# Patient Record
Sex: MALE | Race: WHITE | NOT HISPANIC OR LATINO | Employment: FULL TIME | URBAN - METROPOLITAN AREA
[De-identification: names, ages, dates, MRNs, and addresses within clinical notes are randomized per-mention and may not be internally consistent; named-entity substitution may affect disease eponyms.]

---

## 2018-01-29 ENCOUNTER — OFFICE VISIT (OUTPATIENT)
Dept: RETAIL CLINIC | Facility: CLINIC | Age: 38
End: 2018-01-29

## 2018-01-29 DIAGNOSIS — Z23 NEED FOR VACCINATION: Primary | ICD-10-CM

## 2018-01-29 NOTE — PROGRESS NOTES
Subjective   Devon Smith is a 38 y.o. male.     Reason for Appointment  Vaccine    History of Present Illness  Devon Smith requests Influenza vaccine.  He denies current acute illness, denies adverse reaction to vaccines in the past.  He denies allergy to eggs, latex and any component to vaccines.  He denies history of Guillain Wiseman.    Assessments  Devon was seen today for immunizations.    Diagnoses and all orders for this visit:    Need for vaccination  -     Flu Vaccine Quad PF 3YR+ (FLUARIX/FLUZONE [id number])        VIS given.  Pt advised that the most common post vaccine complaint is that of a sore arm at the injection site.  Pt also advised that this is a normal reaction to this vaccine.  If there are other concerns that arise, the patient has been advised to call the clinic or return to the clinic. If the pt has difficulty breathing, the patient has been advised to go to the ER.  The pt has stated understanding.    This document has been electronically signed by BECK Prater January 29, 2018 6:19 PM

## 2018-11-02 ENCOUNTER — OFFICE VISIT (OUTPATIENT)
Dept: RETAIL CLINIC | Facility: CLINIC | Age: 38
End: 2018-11-02

## 2018-11-02 DIAGNOSIS — Z23 INFLUENZA VACCINATION ADMINISTERED AT CURRENT VISIT: Primary | ICD-10-CM

## 2018-11-02 NOTE — PROGRESS NOTES
S: Requests Flu Vaccine.  Is feeling well today. Denies previous complications of flu vaccine, denies serious egg allergy. [employer] consent obtained.  O: Appears well today.  A: Vaccination against Influenza   P: Age appropriate flu vaccine administered (see vaccine immunization record) Tolerated Well.  Discussed that they may feel achy and fatigued in next day or 2, as their immune system is responding, this is not the flu. Advised that can take tylenol or ibuprofen per package instructions for soreness if needed. Also explained that it takes up to 2 weeks for full immune response. Encouraged general health hygiene. Mona Haro, APRN

## 2023-03-01 ENCOUNTER — APPOINTMENT (OUTPATIENT)
Dept: CT IMAGING | Facility: HOSPITAL | Age: 43
DRG: 246 | End: 2023-03-01
Payer: COMMERCIAL

## 2023-03-01 ENCOUNTER — APPOINTMENT (OUTPATIENT)
Dept: GENERAL RADIOLOGY | Facility: HOSPITAL | Age: 43
DRG: 246 | End: 2023-03-01
Payer: COMMERCIAL

## 2023-03-01 ENCOUNTER — HOSPITAL ENCOUNTER (INPATIENT)
Facility: HOSPITAL | Age: 43
LOS: 3 days | Discharge: HOME | DRG: 246 | End: 2023-03-04
Attending: INTERNAL MEDICINE | Admitting: INTERNAL MEDICINE
Payer: COMMERCIAL

## 2023-03-01 DIAGNOSIS — I21.02 STEMI INVOLVING LEFT ANTERIOR DESCENDING CORONARY ARTERY: Primary | ICD-10-CM

## 2023-03-01 PROBLEM — I10 ESSENTIAL HYPERTENSION: Status: ACTIVE | Noted: 2023-03-01

## 2023-03-01 PROBLEM — E78.5 HYPERLIPIDEMIA LDL GOAL <70: Status: ACTIVE | Noted: 2023-03-01

## 2023-03-01 PROBLEM — I25.110 CORONARY ARTERY DISEASE INVOLVING NATIVE CORONARY ARTERY OF NATIVE HEART WITH UNSTABLE ANGINA PECTORIS: Status: ACTIVE | Noted: 2023-03-01

## 2023-03-01 LAB
ALBUMIN SERPL-MCNC: 4.4 G/DL (ref 3.5–5.2)
ALBUMIN/GLOB SERPL: 1.8 G/DL
ALP SERPL-CCNC: 51 U/L (ref 39–117)
ALT SERPL W P-5'-P-CCNC: 15 U/L (ref 1–41)
ANION GAP SERPL CALCULATED.3IONS-SCNC: 12 MMOL/L (ref 5–15)
ANION GAP SERPL CALCULATED.3IONS-SCNC: 19 MMOL/L (ref 5–15)
AST SERPL-CCNC: 14 U/L (ref 1–40)
BASOPHILS # BLD AUTO: 0.03 10*3/MM3 (ref 0–0.2)
BASOPHILS # BLD AUTO: 0.05 10*3/MM3 (ref 0–0.2)
BASOPHILS NFR BLD AUTO: 0.2 % (ref 0–1.5)
BASOPHILS NFR BLD AUTO: 0.5 % (ref 0–1.5)
BILIRUB SERPL-MCNC: 0.6 MG/DL (ref 0–1.2)
BUN SERPL-MCNC: 15 MG/DL (ref 6–20)
BUN SERPL-MCNC: 21 MG/DL (ref 6–20)
BUN/CREAT SERPL: 21.1 (ref 7–25)
BUN/CREAT SERPL: 21.9 (ref 7–25)
CALCIUM SPEC-SCNC: 8.9 MG/DL (ref 8.6–10.5)
CALCIUM SPEC-SCNC: 9 MG/DL (ref 8.6–10.5)
CHLORIDE SERPL-SCNC: 100 MMOL/L (ref 98–107)
CHLORIDE SERPL-SCNC: 103 MMOL/L (ref 98–107)
CO2 SERPL-SCNC: 17 MMOL/L (ref 22–29)
CO2 SERPL-SCNC: 24 MMOL/L (ref 22–29)
CREAT SERPL-MCNC: 0.71 MG/DL (ref 0.76–1.27)
CREAT SERPL-MCNC: 0.96 MG/DL (ref 0.76–1.27)
DEPRECATED RDW RBC AUTO: 38.8 FL (ref 37–54)
DEPRECATED RDW RBC AUTO: 40.2 FL (ref 37–54)
EGFRCR SERPLBLD CKD-EPI 2021: 115.3 ML/MIN/1.73
EGFRCR SERPLBLD CKD-EPI 2021: 99.3 ML/MIN/1.73
EOSINOPHIL # BLD AUTO: 0.02 10*3/MM3 (ref 0–0.4)
EOSINOPHIL # BLD AUTO: 0.13 10*3/MM3 (ref 0–0.4)
EOSINOPHIL NFR BLD AUTO: 0.2 % (ref 0.3–6.2)
EOSINOPHIL NFR BLD AUTO: 1.2 % (ref 0.3–6.2)
ERYTHROCYTE [DISTWIDTH] IN BLOOD BY AUTOMATED COUNT: 12.2 % (ref 12.3–15.4)
ERYTHROCYTE [DISTWIDTH] IN BLOOD BY AUTOMATED COUNT: 12.2 % (ref 12.3–15.4)
GEN 5 2HR TROPONIN T REFLEX: 6565 NG/L
GLOBULIN UR ELPH-MCNC: 2.4 GM/DL
GLUCOSE SERPL-MCNC: 157 MG/DL (ref 65–99)
GLUCOSE SERPL-MCNC: 169 MG/DL (ref 65–99)
HCT VFR BLD AUTO: 37.9 % (ref 37.5–51)
HCT VFR BLD AUTO: 38.6 % (ref 37.5–51)
HGB BLD-MCNC: 13.1 G/DL (ref 13–17.7)
HGB BLD-MCNC: 13.4 G/DL (ref 13–17.7)
IMM GRANULOCYTES # BLD AUTO: 0.03 10*3/MM3 (ref 0–0.05)
IMM GRANULOCYTES # BLD AUTO: 0.04 10*3/MM3 (ref 0–0.05)
IMM GRANULOCYTES NFR BLD AUTO: 0.2 % (ref 0–0.5)
IMM GRANULOCYTES NFR BLD AUTO: 0.4 % (ref 0–0.5)
LYMPHOCYTES # BLD AUTO: 1.31 10*3/MM3 (ref 0.7–3.1)
LYMPHOCYTES # BLD AUTO: 4.47 10*3/MM3 (ref 0.7–3.1)
LYMPHOCYTES NFR BLD AUTO: 10.9 % (ref 19.6–45.3)
LYMPHOCYTES NFR BLD AUTO: 40.7 % (ref 19.6–45.3)
MAGNESIUM SERPL-MCNC: 1.7 MG/DL (ref 1.6–2.6)
MCH RBC QN AUTO: 30.5 PG (ref 26.6–33)
MCH RBC QN AUTO: 31.1 PG (ref 26.6–33)
MCHC RBC AUTO-ENTMCNC: 34.6 G/DL (ref 31.5–35.7)
MCHC RBC AUTO-ENTMCNC: 34.7 G/DL (ref 31.5–35.7)
MCV RBC AUTO: 88.1 FL (ref 79–97)
MCV RBC AUTO: 89.6 FL (ref 79–97)
MONOCYTES # BLD AUTO: 1.08 10*3/MM3 (ref 0.1–0.9)
MONOCYTES # BLD AUTO: 1.1 10*3/MM3 (ref 0.1–0.9)
MONOCYTES NFR BLD AUTO: 10 % (ref 5–12)
MONOCYTES NFR BLD AUTO: 9 % (ref 5–12)
NEUTROPHILS NFR BLD AUTO: 47.2 % (ref 42.7–76)
NEUTROPHILS NFR BLD AUTO: 5.2 10*3/MM3 (ref 1.7–7)
NEUTROPHILS NFR BLD AUTO: 79.5 % (ref 42.7–76)
NEUTROPHILS NFR BLD AUTO: 9.58 10*3/MM3 (ref 1.7–7)
NRBC BLD AUTO-RTO: 0 /100 WBC (ref 0–0.2)
NRBC BLD AUTO-RTO: 0 /100 WBC (ref 0–0.2)
NT-PROBNP SERPL-MCNC: <5 PG/ML (ref 0–450)
PLATELET # BLD AUTO: 212 10*3/MM3 (ref 140–450)
PLATELET # BLD AUTO: 248 10*3/MM3 (ref 140–450)
PMV BLD AUTO: 9.3 FL (ref 6–12)
PMV BLD AUTO: 9.4 FL (ref 6–12)
POTASSIUM SERPL-SCNC: 3.1 MMOL/L (ref 3.5–5.2)
POTASSIUM SERPL-SCNC: 3.9 MMOL/L (ref 3.5–5.2)
PROT SERPL-MCNC: 6.8 G/DL (ref 6–8.5)
RBC # BLD AUTO: 4.3 10*6/MM3 (ref 4.14–5.8)
RBC # BLD AUTO: 4.31 10*6/MM3 (ref 4.14–5.8)
SODIUM SERPL-SCNC: 136 MMOL/L (ref 136–145)
SODIUM SERPL-SCNC: 139 MMOL/L (ref 136–145)
TROPONIN T DELTA: 6513 NG/L
TROPONIN T SERPL HS-MCNC: 52 NG/L
TSH SERPL DL<=0.05 MIU/L-ACNC: 3.34 UIU/ML (ref 0.27–4.2)
WBC NRBC COR # BLD: 10.99 10*3/MM3 (ref 3.4–10.8)
WBC NRBC COR # BLD: 12.05 10*3/MM3 (ref 3.4–10.8)

## 2023-03-01 PROCEDURE — 83735 ASSAY OF MAGNESIUM: CPT | Performed by: INTERNAL MEDICINE

## 2023-03-01 PROCEDURE — 85025 COMPLETE CBC W/AUTO DIFF WBC: CPT | Performed by: NURSE PRACTITIONER

## 2023-03-01 PROCEDURE — 83880 ASSAY OF NATRIURETIC PEPTIDE: CPT | Performed by: INTERNAL MEDICINE

## 2023-03-01 PROCEDURE — 25510000001 IOPAMIDOL PER 1 ML: Performed by: INTERNAL MEDICINE

## 2023-03-01 PROCEDURE — 93458 L HRT ARTERY/VENTRICLE ANGIO: CPT | Performed by: INTERNAL MEDICINE

## 2023-03-01 PROCEDURE — 0 MAGNESIUM SULFATE 4 GM/100ML SOLUTION: Performed by: NURSE PRACTITIONER

## 2023-03-01 PROCEDURE — C1769 GUIDE WIRE: HCPCS | Performed by: INTERNAL MEDICINE

## 2023-03-01 PROCEDURE — 80050 GENERAL HEALTH PANEL: CPT | Performed by: INTERNAL MEDICINE

## 2023-03-01 PROCEDURE — B221Z2Z COMPUTERIZED TOMOGRAPHY (CT SCAN) OF MULTIPLE CORONARY ARTERIES USING INTRAVASCULAR OPTICAL COHERENCE: ICD-10-PCS | Performed by: INTERNAL MEDICINE

## 2023-03-01 PROCEDURE — C1725 CATH, TRANSLUMIN NON-LASER: HCPCS | Performed by: INTERNAL MEDICINE

## 2023-03-01 PROCEDURE — 92941 PRQ TRLML REVSC TOT OCCL AMI: CPT | Performed by: INTERNAL MEDICINE

## 2023-03-01 PROCEDURE — 25010000002 CANGRELOR TETRASODIUM 50 MG RECONSTITUTED SOLUTION 1 EACH VIAL: Performed by: INTERNAL MEDICINE

## 2023-03-01 PROCEDURE — 84484 ASSAY OF TROPONIN QUANT: CPT | Performed by: INTERNAL MEDICINE

## 2023-03-01 PROCEDURE — 74176 CT ABD & PELVIS W/O CONTRAST: CPT

## 2023-03-01 PROCEDURE — 99223 1ST HOSP IP/OBS HIGH 75: CPT | Performed by: INTERNAL MEDICINE

## 2023-03-01 PROCEDURE — 71250 CT THORAX DX C-: CPT

## 2023-03-01 PROCEDURE — C1887 CATHETER, GUIDING: HCPCS | Performed by: INTERNAL MEDICINE

## 2023-03-01 PROCEDURE — 93005 ELECTROCARDIOGRAM TRACING: CPT

## 2023-03-01 PROCEDURE — 80048 BASIC METABOLIC PNL TOTAL CA: CPT | Performed by: NURSE PRACTITIONER

## 2023-03-01 PROCEDURE — 71045 X-RAY EXAM CHEST 1 VIEW: CPT

## 2023-03-01 PROCEDURE — 86900 BLOOD TYPING SEROLOGIC ABO: CPT | Performed by: NURSE PRACTITIONER

## 2023-03-01 PROCEDURE — 25010000002 FENTANYL CITRATE (PF) 50 MCG/ML SOLUTION: Performed by: INTERNAL MEDICINE

## 2023-03-01 PROCEDURE — 93005 ELECTROCARDIOGRAM TRACING: CPT | Performed by: NURSE PRACTITIONER

## 2023-03-01 PROCEDURE — 92978 ENDOLUMINL IVUS OCT C 1ST: CPT | Performed by: INTERNAL MEDICINE

## 2023-03-01 PROCEDURE — C9606 PERC D-E COR REVASC W AMI S: HCPCS | Performed by: INTERNAL MEDICINE

## 2023-03-01 PROCEDURE — 86901 BLOOD TYPING SEROLOGIC RH(D): CPT | Performed by: NURSE PRACTITIONER

## 2023-03-01 PROCEDURE — 4A023N7 MEASUREMENT OF CARDIAC SAMPLING AND PRESSURE, LEFT HEART, PERCUTANEOUS APPROACH: ICD-10-PCS | Performed by: INTERNAL MEDICINE

## 2023-03-01 PROCEDURE — 82565 ASSAY OF CREATININE: CPT

## 2023-03-01 PROCEDURE — 25010000002 MIDAZOLAM PER 1 MG: Performed by: INTERNAL MEDICINE

## 2023-03-01 PROCEDURE — C1874 STENT, COATED/COV W/DEL SYS: HCPCS | Performed by: INTERNAL MEDICINE

## 2023-03-01 PROCEDURE — B2151ZZ FLUOROSCOPY OF LEFT HEART USING LOW OSMOLAR CONTRAST: ICD-10-PCS | Performed by: INTERNAL MEDICINE

## 2023-03-01 PROCEDURE — 027034Z DILATION OF CORONARY ARTERY, ONE ARTERY WITH DRUG-ELUTING INTRALUMINAL DEVICE, PERCUTANEOUS APPROACH: ICD-10-PCS | Performed by: INTERNAL MEDICINE

## 2023-03-01 PROCEDURE — 25010000002 HEPARIN (PORCINE) PER 1000 UNITS: Performed by: INTERNAL MEDICINE

## 2023-03-01 PROCEDURE — 86850 RBC ANTIBODY SCREEN: CPT | Performed by: NURSE PRACTITIONER

## 2023-03-01 PROCEDURE — 25010000002 ONDANSETRON PER 1 MG: Performed by: INTERNAL MEDICINE

## 2023-03-01 PROCEDURE — C1894 INTRO/SHEATH, NON-LASER: HCPCS | Performed by: INTERNAL MEDICINE

## 2023-03-01 PROCEDURE — 85347 COAGULATION TIME ACTIVATED: CPT

## 2023-03-01 PROCEDURE — 25010000002 KETOROLAC TROMETHAMINE PER 15 MG: Performed by: NURSE PRACTITIONER

## 2023-03-01 PROCEDURE — C9460 INJECTION, CANGRELOR: HCPCS | Performed by: INTERNAL MEDICINE

## 2023-03-01 PROCEDURE — C1753 CATH, INTRAVAS ULTRASOUND: HCPCS | Performed by: INTERNAL MEDICINE

## 2023-03-01 PROCEDURE — B2111ZZ FLUOROSCOPY OF MULTIPLE CORONARY ARTERIES USING LOW OSMOLAR CONTRAST: ICD-10-PCS | Performed by: INTERNAL MEDICINE

## 2023-03-01 DEVICE — XIENCE SKYPOINT™ EVEROLIMUS ELUTING CORONARY STENT SYSTEM 4.00 MM X 38 MM / RAPID-EXCHANGE
Type: IMPLANTABLE DEVICE | Status: FUNCTIONAL
Brand: XIENCE SKYPOINT™

## 2023-03-01 RX ORDER — HEPARIN SODIUM 1000 [USP'U]/ML
INJECTION, SOLUTION INTRAVENOUS; SUBCUTANEOUS
Status: DISCONTINUED | OUTPATIENT
Start: 2023-03-01 | End: 2023-03-01 | Stop reason: HOSPADM

## 2023-03-01 RX ORDER — FENTANYL CITRATE 50 UG/ML
INJECTION, SOLUTION INTRAMUSCULAR; INTRAVENOUS
Status: DISCONTINUED | OUTPATIENT
Start: 2023-03-01 | End: 2023-03-01 | Stop reason: HOSPADM

## 2023-03-01 RX ORDER — MAGNESIUM SULFATE HEPTAHYDRATE 40 MG/ML
4 INJECTION, SOLUTION INTRAVENOUS ONCE
Status: COMPLETED | OUTPATIENT
Start: 2023-03-01 | End: 2023-03-02

## 2023-03-01 RX ORDER — ONDANSETRON 2 MG/ML
INJECTION INTRAMUSCULAR; INTRAVENOUS
Status: DISCONTINUED | OUTPATIENT
Start: 2023-03-01 | End: 2023-03-01 | Stop reason: HOSPADM

## 2023-03-01 RX ORDER — OXYCODONE HYDROCHLORIDE AND ACETAMINOPHEN 5; 325 MG/1; MG/1
1 TABLET ORAL EVERY 4 HOURS PRN
Status: DISCONTINUED | OUTPATIENT
Start: 2023-03-01 | End: 2023-03-04 | Stop reason: HOSPADM

## 2023-03-01 RX ORDER — LOSARTAN POTASSIUM 25 MG/1
25 TABLET ORAL DAILY
Status: DISCONTINUED | OUTPATIENT
Start: 2023-03-01 | End: 2023-03-03

## 2023-03-01 RX ORDER — LIDOCAINE HYDROCHLORIDE 10 MG/ML
INJECTION, SOLUTION EPIDURAL; INFILTRATION; INTRACAUDAL; PERINEURAL
Status: DISCONTINUED | OUTPATIENT
Start: 2023-03-01 | End: 2023-03-01 | Stop reason: HOSPADM

## 2023-03-01 RX ORDER — ALUMINA, MAGNESIA, AND SIMETHICONE 2400; 2400; 240 MG/30ML; MG/30ML; MG/30ML
15 SUSPENSION ORAL EVERY 6 HOURS PRN
Status: DISCONTINUED | OUTPATIENT
Start: 2023-03-01 | End: 2023-03-04 | Stop reason: HOSPADM

## 2023-03-01 RX ORDER — ROSUVASTATIN CALCIUM 20 MG/1
40 TABLET, COATED ORAL NIGHTLY
Status: DISCONTINUED | OUTPATIENT
Start: 2023-03-01 | End: 2023-03-04 | Stop reason: HOSPADM

## 2023-03-01 RX ORDER — ONDANSETRON 4 MG/1
4 TABLET, FILM COATED ORAL EVERY 6 HOURS PRN
Status: DISCONTINUED | OUTPATIENT
Start: 2023-03-01 | End: 2023-03-04 | Stop reason: HOSPADM

## 2023-03-01 RX ORDER — AMLODIPINE BESYLATE 5 MG/1
5 TABLET ORAL ONCE
Status: COMPLETED | OUTPATIENT
Start: 2023-03-01 | End: 2023-03-01

## 2023-03-01 RX ORDER — ASPIRIN 81 MG/1
81 TABLET ORAL DAILY
Status: DISCONTINUED | OUTPATIENT
Start: 2023-03-02 | End: 2023-03-04 | Stop reason: HOSPADM

## 2023-03-01 RX ORDER — NITROGLYCERIN 0.4 MG/1
0.4 TABLET SUBLINGUAL
Status: DISCONTINUED | OUTPATIENT
Start: 2023-03-01 | End: 2023-03-04 | Stop reason: HOSPADM

## 2023-03-01 RX ORDER — ACETAMINOPHEN 325 MG/1
650 TABLET ORAL EVERY 4 HOURS PRN
Status: DISCONTINUED | OUTPATIENT
Start: 2023-03-01 | End: 2023-03-04 | Stop reason: HOSPADM

## 2023-03-01 RX ORDER — KETOROLAC TROMETHAMINE 15 MG/ML
15 INJECTION, SOLUTION INTRAMUSCULAR; INTRAVENOUS EVERY 6 HOURS PRN
Status: DISCONTINUED | OUTPATIENT
Start: 2023-03-01 | End: 2023-03-04 | Stop reason: HOSPADM

## 2023-03-01 RX ORDER — CARVEDILOL 6.25 MG/1
6.25 TABLET ORAL EVERY 12 HOURS SCHEDULED
Status: DISCONTINUED | OUTPATIENT
Start: 2023-03-01 | End: 2023-03-02

## 2023-03-01 RX ORDER — MIDAZOLAM HYDROCHLORIDE 1 MG/ML
INJECTION INTRAMUSCULAR; INTRAVENOUS
Status: DISCONTINUED | OUTPATIENT
Start: 2023-03-01 | End: 2023-03-01 | Stop reason: HOSPADM

## 2023-03-01 RX ORDER — NICARDIPINE HCL-0.9% SOD CHLOR 1 MG/10 ML
SYRINGE (ML) INTRAVENOUS
Status: DISCONTINUED | OUTPATIENT
Start: 2023-03-01 | End: 2023-03-01 | Stop reason: HOSPADM

## 2023-03-01 RX ORDER — MAGNESIUM SULFATE HEPTAHYDRATE 40 MG/ML
4 INJECTION, SOLUTION INTRAVENOUS AS NEEDED
Status: DISCONTINUED | OUTPATIENT
Start: 2023-03-01 | End: 2023-03-04 | Stop reason: HOSPADM

## 2023-03-01 RX ORDER — ALPRAZOLAM 0.25 MG/1
0.25 TABLET ORAL 3 TIMES DAILY PRN
Status: DISCONTINUED | OUTPATIENT
Start: 2023-03-01 | End: 2023-03-04 | Stop reason: HOSPADM

## 2023-03-01 RX ORDER — POTASSIUM CHLORIDE 750 MG/1
40 CAPSULE, EXTENDED RELEASE ORAL EVERY 4 HOURS
Status: COMPLETED | OUTPATIENT
Start: 2023-03-01 | End: 2023-03-02

## 2023-03-01 RX ORDER — ONDANSETRON 2 MG/ML
4 INJECTION INTRAMUSCULAR; INTRAVENOUS EVERY 6 HOURS PRN
Status: DISCONTINUED | OUTPATIENT
Start: 2023-03-01 | End: 2023-03-04 | Stop reason: HOSPADM

## 2023-03-01 RX ORDER — ATORVASTATIN CALCIUM 40 MG/1
80 TABLET, FILM COATED ORAL NIGHTLY
Status: DISCONTINUED | OUTPATIENT
Start: 2023-03-01 | End: 2023-03-01

## 2023-03-01 RX ADMIN — ONDANSETRON 4 MG: 2 INJECTION INTRAMUSCULAR; INTRAVENOUS at 21:20

## 2023-03-01 RX ADMIN — ROSUVASTATIN 40 MG: 20 TABLET, FILM COATED ORAL at 21:11

## 2023-03-01 RX ADMIN — NITROGLYCERIN 0.4 MG: 0.4 TABLET SUBLINGUAL at 22:11

## 2023-03-01 RX ADMIN — MAGNESIUM SULFATE HEPTAHYDRATE 4 G: 40 INJECTION, SOLUTION INTRAVENOUS at 21:12

## 2023-03-01 RX ADMIN — POTASSIUM CHLORIDE 40 MEQ: 10 CAPSULE, COATED, EXTENDED RELEASE ORAL at 21:11

## 2023-03-01 RX ADMIN — LOSARTAN POTASSIUM 25 MG: 25 TABLET, FILM COATED ORAL at 17:31

## 2023-03-01 RX ADMIN — OXYCODONE HYDROCHLORIDE AND ACETAMINOPHEN 1 TABLET: 5; 325 TABLET ORAL at 21:18

## 2023-03-01 RX ADMIN — POTASSIUM CHLORIDE 40 MEQ: 10 CAPSULE, COATED, EXTENDED RELEASE ORAL at 17:42

## 2023-03-01 RX ADMIN — KETOROLAC TROMETHAMINE 15 MG: 15 INJECTION, SOLUTION INTRAMUSCULAR; INTRAVENOUS at 18:43

## 2023-03-01 RX ADMIN — CARVEDILOL 6.25 MG: 6.25 TABLET, FILM COATED ORAL at 18:44

## 2023-03-01 RX ADMIN — AMLODIPINE BESYLATE 5 MG: 5 TABLET ORAL at 21:12

## 2023-03-01 NOTE — PROGRESS NOTES
INTENSIVIST   PROGRESS NOTE        SUBJECTIVE     Devon 43 y.o. male is followed for: No chief complaint on file.       STEMI involving left anterior descending coronary artery (HCC)    Hyperlipidemia LDL goal <70    Essential hypertension    Coronary artery disease involving native coronary artery of native heart with unstable angina pectoris (HCC)    As an Intensivist, we provide an integrated approach to the ICU patient and family, medical management of comorbid conditions, including but not limited to electrolytes, glycemic control, organ dysfunction, lead interdisciplinary rounds and coordinate the care with all other services, including those from other specialists.     Interval History:  POD: Day of Surgery (LHC)    Transferred from [facility] EMS to the Cath Lab because of chest pain, and ECG compatible with an Anterior AMI.     I am seeing him in 2A ICU after his LHC.    He still feels a weight on his chest that does not allow him to take a deep breath.    He states he has had chest pain for the last 10 yrs but his doctor told him not to worry.    He has had chest pain with activities/exercise, but not consistently, he has been able to walk at a fast pace, without pain, but lifting heavy weight triggered chest pains.    Temp  Min: 98.4 °F (36.9 °C)  Max: 98.4 °F (36.9 °C)       History     Last Reviewed by Kam Mancilla MD on 3/1/2023 at  5:12 PM    Sections Reviewed    Medical, Family, Social Documentation    Problem list reviewed by Kam Mancilla MD on 3/1/2023 at  5:12 PM  Medicines reviewed by Kam Mancilla MD on 3/1/2023 at  5:12 PM  Allergies reviewed by Kam Mancilla MD on 3/1/2023 at  5:12 PM       The patient's relevant past medical, surgical and social history were reviewed and updated in Epic as appropriate.        OBJECTIVE     Vitals:  Temp: 98.4 °F (36.9 °C) (03/01/23 1650) Temp  Min: 98.4 °F (36.9 °C)  Max: 98.4 °F (36.9 °C)   Temp core:      BP: 111/79 (03/01/23 1700) BP  Min:  111/79  Max: 176/103   MAP (non-invasive) Noninvasive MAP (mmHg): 88 (23 1700) Noninvasive MAP (mmHg)  Av  Min: 88  Max: 92   Pulse: 96 (23 1700) Pulse  Min: 96  Max: 123   Resp: 16 (23 165) Resp  Min: 16  Max: 19   SpO2: 96 % (23 170) SpO2  Min: 96 %  Max: 99 %   Device: room air (23)    Flow Rate: 2 (23 153) Flow (L/min)  Min: 2  Max: 2         23  1535   Weight: 81.6 kg (179 lb 14.3 oz)        Intake/Ouptut 24 hrs (7:00AM - 6:59 AM)  Intake & Output (last 3 days)     None             Physical Examination  Telemetry:  Rhythm: normal sinus rhythm (23 162)         Constitutional:  No acute distress.   Cardiovascular: RRR.    Respiratory: Normal breath sounds  No adventitious sounds.   Abdominal:  Soft with no tenderness.   Extremities: No Edema   Neurological:   Alert, Oriented, Cooperative.                   Results Reviewed:  Laboratory  Microbiology  Radiology  Pathology    Hematology:  Results from last 7 days   Lab Units 23  1510   WBC 10*3/mm3 10.99*   HEMOGLOBIN g/dL 13.1   MCV fL 88.1   PLATELETS 10*3/mm3 248   NEUTROS ABS 10*3/mm3 5.20   EOS ABS 10*3/mm3 0.13       Chemistry:  Estimated Creatinine Clearance: 112.2 mL/min (by C-G formula based on SCr of 0.96 mg/dL).    Results from last 7 days   Lab Units 23  1554   SODIUM mmol/L 139   POTASSIUM mmol/L 3.1*   CHLORIDE mmol/L 103   CO2 mmol/L 17.0*   BUN mg/dL 21*   CREATININE mg/dL 0.96   GLUCOSE mg/dL 157*     Results from last 7 days   Lab Units 23  1554   CALCIUM mg/dL 8.9     Results from last 7 days   Lab Units 23  1554   BILIRUBIN mg/dL 0.6   AST (SGOT) U/L 14   ALT (SGPT) U/L 15   ALK PHOS U/L 51       Cardiac Labs:  Results from last 7 days   Lab Units 23  1554   PROBNP pg/mL <5.0   HSTROP T ng/L 52*       Images:  No radiology results for the last day    Echo:    Results: Reviewed.  I reviewed the patient's new laboratory and imaging results.  I independently  reviewed the patient's new images.    Medications: Reviewed.    Assessment   A/P     Hospital:  LOS: 0 days   ICU: 22m     Active Hospital Problems    Diagnosis  POA   • **STEMI involving left anterior descending coronary artery (HCC) [I21.02]  Yes   • Hyperlipidemia LDL goal <70 [E78.5]  Unknown   • Essential hypertension [I10]  Unknown   • Coronary artery disease involving native coronary artery of native heart with unstable angina pectoris (HCC) [I25.110]  Unknown     3/1/2023: Anterior STEMI, mid LAD 10 percent thrombotic occlusion status post OCT guided PCI with a 4.0 x 38 mm Xience KAVON, proximal LAD 30-40%, normal circumflex and RCA, LVEF 25-30% due to anterior apical and distal inferior akinesis, EDP 36 mmHg       Devon is a 43 y.o. male transferred from [facility] on 3/1/2023 withchest pain and anterior STEMI.    Substernal chest pain and pressure, described as a squeezing, crushing sensation in his substernal chest region rating to bilateral arms and bilateral jaw.  EMS performed field ECG which showed significant 4 mm anterior lateral ST elevation with reciprocal depression.         Assessment/Management/Treatment Plan:    1. Anterior STEMI  a. [facility] 3/1/2023: mid LAD 10% thrombotic occlusion status post OCT guided PCI with a 4.0 x 38 mm Xience KAVON, proximal LAD 30-40%, normal circumflex and RCA, LVEF 25-30% due to anterior apical and distal inferior akinesis, EDP 36 mmHg  2. Cardiovascular  a. HTN  b. Dyslipidemia  c. CAD  3. Non-smoker  4. Endocrine  a. Body mass index is 23.1 kg/m². Normal: 18.5-24.9kg/m2  b. No history of T2 Diabetes    No results found for: HGBA1C        Diet: Diet: Cardiac Diets; Healthy Heart (2-3 Na+); Texture: Regular Texture (IDDSI 7); Fluid Consistency: Thin (IDDSI 0)   Advance Directives: Code Status and Medical Interventions:   Ordered at: 03/01/23 1635     Level Of Support Discussed With:    Patient     Code Status (Patient has no pulse and is not breathing):    CPR  (Attempt to Resuscitate)     Medical Interventions (Patient has pulse or is breathing):    Full Support     Release to patient:    Routine Release        DVT prophylaxis:  No DVT prophylaxis order currently exists.     In brief:  1. Monitor electrolytes. Replace K. Check Magnesium.  2. Cangrelor continuous intravenous infusion   3. PCXR  4. Monitor for arrhthymias  5. Monitor for bleeding  6. Hemodynamic support.  7. Disposition: Admit to ICU    Plan of care and goals reviewed during interdisciplinary rounds.  I discussed the patient's findings and my recommendations with patient and nursing staff    MDM:    Problem(s) High due to: Acute illness(es) that may pose a threat to life or bodily function  Risk: High due to: drug(s) requiring intensive monitoring for toxicity    High      [x] Primary Attending Intensive Care Medicine - Nutrition Support   [x] Consultant

## 2023-03-01 NOTE — H&P
[facility] at [facility]  Cardiology H&P Note  [facility] CATH LAB          Patient Identification:  Devon Smith      [id number]  43 y.o.        male  1980       Date of Admission:  03/01/23    Chief complaint: Chest pain    PCP: Provider, No Known  Primary cardiologist:     History of Present Illness:     43-year-old gentleman with previous history of hyperlipidemia, presenting via [facility] with chest pain and anterior STEMI, he developed substernal chest pain and pressure, described as a squeezing, crushing sensation in his substernal chest region rating to bilateral arms and bilateral jaw, EMS performed field ECG which showed significant 4 mm anterior lateral ST elevation with reciprocal depression.  He was transported emergently to our Cath Lab.  He still has jaw pain and feels clammy appears diaphoretic.  No previous history of tobacco abuse or coronary disease.    Past History:  Hyperlipidemia  Hypertension    No significant previous surgical history  Social History     Socioeconomic History   • Marital status:      Denies tobacco or alcohol use     family history:  No previous significant family history of coronary artery disease.  Medications:  No medications prior to admission.     Current medications:    Current IV drips:  No current facility-administered medications for this encounter.      Review of Systems:    Constitutional no fever,  no weight loss   Skin no rash   Otolaryngeal no difficulty swallowing   Cardiovascular See HPI   Pulmonary no cough, no sputum production   Gastrointestinal no constipation, no diarrhea   Genitourinary no dysuria, no hematuria   Hematologic no easy bruisability, no abnormal bleeding   Musculoskeletal no muscle pain   Neurologic no dizziness, no falls     Physical exam:    There were no vitals taken for this visit. There is no height or weight on file to calculate BMI.   Oxygen saturation   No data  recorded    General Appearance:   · well developed  · well nourished  HENT:   · oropharynx moist  · lips not cyanotic  Neck:  · thyroid not enlarged  · supple  Respiratory:  · no respiratory distress  · normal breath sounds  · no rales  Cardiovascular:  · no jugular venous distention  · regular rhythm  · apical impulse normal  · S1 normal, S2 normal  · no S3, no S4   · no murmur  · no rub, no thrill  · carotid pulses normal; no bruit  · pedal pulses normal  · lower extremity edema: none    Gastrointestinal:   · bowel sounds normal  · non-tender  · no hepatomegaly, no splenomegaly  Musculoskeletal:  · no clubbing of fingers.   · normocephalic, head atraumatic  Skin:   · warm, dry  Psychiatric:  · judgement and insight appropriate  · normal mood and affect    Cardiographics:     ECG  (personally reviewed) normal sinus rhythm with significant 4 mm ST elevation in the precordial leads.   Telemetry:  (personally reviewed) normal sinus rhythm with frequent PVCs   ECHO:        CATH:     CARDIOLITE:      Lab Review:           Invalid input(s): PLATELETCT            CrCl cannot be calculated (No successful lab value found.).   No results found for: CHOL, TRIG, HDL, LDL, AST, ALT        No results found for: TSH   No results found for: HGBA1C        No results found for: DIGOXIN   No results found for: DDIMERQUANT     Imaging:  All pertinent imaging studies were personally reviewed.    Assessment:      STEMI involving left anterior descending coronary artery (HCC)    Hyperlipidemia LDL goal <70    Essential hypertension          Recommendations:  1.  Anterior STEMI:  Proceed with emergent cardiac catheterization  Patient received aspirin 325 mg via EMS as well as nitroglycerin  Check echocardiogram   dual antiplatelet therapy will be prescribed  High intensity statin therapy will be administered    2.  Mixed hyperlipidemia:  Check lipid panel in the morning  Start atorvastatin 80 mg daily    3.  Essential hypertension:  Goal  blood pressure less than 130/80  Beta-blocker and ARB will be prescribed    Discussed with patient's nurse, and EMS        Arsenio Ramos MD  3/1/2023  15:34 EST

## 2023-03-02 ENCOUNTER — APPOINTMENT (OUTPATIENT)
Dept: CARDIOLOGY | Facility: HOSPITAL | Age: 43
DRG: 246 | End: 2023-03-02
Payer: COMMERCIAL

## 2023-03-02 LAB
ABO GROUP BLD: NORMAL
ABO GROUP BLD: NORMAL
ACT BLD: 221 SECONDS (ref 82–152)
ACT BLD: 263 SECONDS (ref 82–152)
ANION GAP SERPL CALCULATED.3IONS-SCNC: 15 MMOL/L (ref 5–15)
BH CV ECHO MEAS - AO MAX PG: 5.8 MMHG
BH CV ECHO MEAS - AO MEAN PG: 3 MMHG
BH CV ECHO MEAS - AO ROOT DIAM: 3.9 CM
BH CV ECHO MEAS - AO V2 MAX: 120 CM/SEC
BH CV ECHO MEAS - AO V2 VTI: 17.8 CM
BH CV ECHO MEAS - AVA(I,D): 2.8 CM2
BH CV ECHO MEAS - EDV(CUBED): 97.3 ML
BH CV ECHO MEAS - EDV(MOD-SP2): 186 ML
BH CV ECHO MEAS - EDV(MOD-SP4): 170 ML
BH CV ECHO MEAS - EF(MOD-BP): 15.7 %
BH CV ECHO MEAS - EF(MOD-SP2): 13.4 %
BH CV ECHO MEAS - EF(MOD-SP4): 17.6 %
BH CV ECHO MEAS - ESV(CUBED): 54.9 ML
BH CV ECHO MEAS - ESV(MOD-SP2): 161 ML
BH CV ECHO MEAS - ESV(MOD-SP4): 140 ML
BH CV ECHO MEAS - FS: 17.4 %
BH CV ECHO MEAS - IVS/LVPW: 1.09 CM
BH CV ECHO MEAS - IVSD: 1.2 CM
BH CV ECHO MEAS - LA DIMENSION: 2.9 CM
BH CV ECHO MEAS - LAT PEAK E' VEL: 7.1 CM/SEC
BH CV ECHO MEAS - LV MASS(C)D: 192.9 GRAMS
BH CV ECHO MEAS - LV MAX PG: 3.8 MMHG
BH CV ECHO MEAS - LV MEAN PG: 2 MMHG
BH CV ECHO MEAS - LV V1 MAX: 96.9 CM/SEC
BH CV ECHO MEAS - LV V1 VTI: 13.1 CM
BH CV ECHO MEAS - LVIDD: 4.6 CM
BH CV ECHO MEAS - LVIDS: 3.8 CM
BH CV ECHO MEAS - LVOT AREA: 3.8 CM2
BH CV ECHO MEAS - LVOT DIAM: 2.2 CM
BH CV ECHO MEAS - LVPWD: 1.1 CM
BH CV ECHO MEAS - MED PEAK E' VEL: 5 CM/SEC
BH CV ECHO MEAS - MV DEC SLOPE: 264 CM/SEC2
BH CV ECHO MEAS - MV E MAX VEL: 104 CM/SEC
BH CV ECHO MEAS - MV MAX PG: 6.7 MMHG
BH CV ECHO MEAS - MV MEAN PG: 3 MMHG
BH CV ECHO MEAS - MV P1/2T: 133.1 MSEC
BH CV ECHO MEAS - MV V2 VTI: 22.8 CM
BH CV ECHO MEAS - MVA(P1/2T): 1.65 CM2
BH CV ECHO MEAS - MVA(VTI): 2.18 CM2
BH CV ECHO MEAS - PA ACC TIME: 0.16 SEC
BH CV ECHO MEAS - PA PR(ACCEL): 6.1 MMHG
BH CV ECHO MEAS - SV(LVOT): 49.8 ML
BH CV ECHO MEAS - SV(MOD-SP2): 25 ML
BH CV ECHO MEAS - SV(MOD-SP4): 30 ML
BH CV ECHO MEAS - TAPSE (>1.6): 1.98 CM
BH CV ECHO MEASUREMENTS AVERAGE E/E' RATIO: 17.19
BH CV XLRA - RV BASE: 3 CM
BH CV XLRA - RV LENGTH: 5.1 CM
BH CV XLRA - RV MID: 2.6 CM
BH CV XLRA - TDI S': 17.1 CM/SEC
BLD GP AB SCN SERPL QL: NEGATIVE
BUN SERPL-MCNC: 12 MG/DL (ref 6–20)
BUN/CREAT SERPL: 13.8 (ref 7–25)
CALCIUM SPEC-SCNC: 9.1 MG/DL (ref 8.6–10.5)
CHLORIDE SERPL-SCNC: 99 MMOL/L (ref 98–107)
CHOLEST SERPL-MCNC: 232 MG/DL (ref 0–200)
CO2 SERPL-SCNC: 23 MMOL/L (ref 22–29)
CREAT BLDA-MCNC: 0.9 MG/DL (ref 0.6–1.3)
CREAT SERPL-MCNC: 0.87 MG/DL (ref 0.76–1.27)
EGFRCR SERPLBLD CKD-EPI 2021: 108.4 ML/MIN/1.73
GLUCOSE SERPL-MCNC: 154 MG/DL (ref 65–99)
HBA1C MFR BLD: 5.7 % (ref 4.8–5.6)
HDLC SERPL-MCNC: 54 MG/DL (ref 40–60)
LDLC SERPL CALC-MCNC: 156 MG/DL (ref 0–100)
LDLC/HDLC SERPL: 2.84 {RATIO}
LEFT ATRIUM VOLUME INDEX: 9.9 ML/M2
MAGNESIUM SERPL-MCNC: 2.6 MG/DL (ref 1.6–2.6)
MAXIMAL PREDICTED HEART RATE: 175 BPM
PA ADP PRP-ACNC: 143 PRU
POTASSIUM SERPL-SCNC: 4.5 MMOL/L (ref 3.5–5.2)
RH BLD: POSITIVE
RH BLD: POSITIVE
SODIUM SERPL-SCNC: 137 MMOL/L (ref 136–145)
STRESS TARGET HR: 149 BPM
T&S EXPIRATION DATE: NORMAL
TRIGL SERPL-MCNC: 122 MG/DL (ref 0–150)
TROPONIN T SERPL HS-MCNC: ABNORMAL NG/L
VLDLC SERPL-MCNC: 22 MG/DL (ref 5–40)

## 2023-03-02 PROCEDURE — 25010000002 SULFUR HEXAFLUORIDE MICROSPH 60.7-25 MG RECONSTITUTED SUSPENSION: Performed by: INTERNAL MEDICINE

## 2023-03-02 PROCEDURE — 93306 TTE W/DOPPLER COMPLETE: CPT | Performed by: INTERNAL MEDICINE

## 2023-03-02 PROCEDURE — 99232 SBSQ HOSP IP/OBS MODERATE 35: CPT | Performed by: INTERNAL MEDICINE

## 2023-03-02 PROCEDURE — 86900 BLOOD TYPING SEROLOGIC ABO: CPT

## 2023-03-02 PROCEDURE — 86901 BLOOD TYPING SEROLOGIC RH(D): CPT

## 2023-03-02 PROCEDURE — 93005 ELECTROCARDIOGRAM TRACING: CPT | Performed by: INTERNAL MEDICINE

## 2023-03-02 PROCEDURE — 83735 ASSAY OF MAGNESIUM: CPT | Performed by: INTERNAL MEDICINE

## 2023-03-02 PROCEDURE — 80048 BASIC METABOLIC PNL TOTAL CA: CPT | Performed by: INTERNAL MEDICINE

## 2023-03-02 PROCEDURE — 83036 HEMOGLOBIN GLYCOSYLATED A1C: CPT | Performed by: INTERNAL MEDICINE

## 2023-03-02 PROCEDURE — 84484 ASSAY OF TROPONIN QUANT: CPT | Performed by: INTERNAL MEDICINE

## 2023-03-02 PROCEDURE — 80061 LIPID PANEL: CPT | Performed by: INTERNAL MEDICINE

## 2023-03-02 PROCEDURE — 93306 TTE W/DOPPLER COMPLETE: CPT

## 2023-03-02 PROCEDURE — 85576 BLOOD PLATELET AGGREGATION: CPT | Performed by: INTERNAL MEDICINE

## 2023-03-02 PROCEDURE — 63710000001 ONDANSETRON PER 8 MG: Performed by: INTERNAL MEDICINE

## 2023-03-02 PROCEDURE — 63710000001 PROMETHAZINE PER 25 MG

## 2023-03-02 PROCEDURE — 25010000002 KETOROLAC TROMETHAMINE PER 15 MG: Performed by: NURSE PRACTITIONER

## 2023-03-02 PROCEDURE — 99233 SBSQ HOSP IP/OBS HIGH 50: CPT | Performed by: INTERNAL MEDICINE

## 2023-03-02 PROCEDURE — 25010000002 ONDANSETRON PER 1 MG: Performed by: INTERNAL MEDICINE

## 2023-03-02 RX ORDER — OMEGA-3 FATTY ACIDS/FISH OIL 300-1000MG
2 CAPSULE ORAL 2 TIMES DAILY PRN
COMMUNITY
End: 2023-03-04 | Stop reason: HOSPADM

## 2023-03-02 RX ORDER — AMLODIPINE BESYLATE 5 MG/1
5 TABLET ORAL DAILY
Status: DISCONTINUED | OUTPATIENT
Start: 2023-03-02 | End: 2023-03-03

## 2023-03-02 RX ORDER — CARVEDILOL 12.5 MG/1
12.5 TABLET ORAL EVERY 12 HOURS SCHEDULED
Status: DISCONTINUED | OUTPATIENT
Start: 2023-03-02 | End: 2023-03-03

## 2023-03-02 RX ORDER — PROMETHAZINE HYDROCHLORIDE 25 MG/1
25 TABLET ORAL ONCE
Status: COMPLETED | OUTPATIENT
Start: 2023-03-02 | End: 2023-03-02

## 2023-03-02 RX ADMIN — CARVEDILOL 6.25 MG: 6.25 TABLET, FILM COATED ORAL at 08:52

## 2023-03-02 RX ADMIN — KETOROLAC TROMETHAMINE 15 MG: 15 INJECTION, SOLUTION INTRAMUSCULAR; INTRAVENOUS at 07:59

## 2023-03-02 RX ADMIN — ALPRAZOLAM 0.25 MG: 0.25 TABLET ORAL at 14:12

## 2023-03-02 RX ADMIN — TICAGRELOR 90 MG: 90 TABLET ORAL at 08:53

## 2023-03-02 RX ADMIN — PROMETHAZINE HYDROCHLORIDE 25 MG: 25 TABLET ORAL at 01:48

## 2023-03-02 RX ADMIN — TICAGRELOR 90 MG: 90 TABLET ORAL at 20:33

## 2023-03-02 RX ADMIN — CARVEDILOL 12.5 MG: 12.5 TABLET, FILM COATED ORAL at 20:32

## 2023-03-02 RX ADMIN — KETOROLAC TROMETHAMINE 15 MG: 15 INJECTION, SOLUTION INTRAMUSCULAR; INTRAVENOUS at 01:48

## 2023-03-02 RX ADMIN — KETOROLAC TROMETHAMINE 15 MG: 15 INJECTION, SOLUTION INTRAMUSCULAR; INTRAVENOUS at 14:12

## 2023-03-02 RX ADMIN — ONDANSETRON HYDROCHLORIDE 4 MG: 4 TABLET, FILM COATED ORAL at 11:47

## 2023-03-02 RX ADMIN — POTASSIUM CHLORIDE 40 MEQ: 10 CAPSULE, COATED, EXTENDED RELEASE ORAL at 04:29

## 2023-03-02 RX ADMIN — SULFUR HEXAFLUORIDE 3 ML: KIT at 13:47

## 2023-03-02 RX ADMIN — ASPIRIN 81 MG: 81 TABLET, COATED ORAL at 08:53

## 2023-03-02 RX ADMIN — LOSARTAN POTASSIUM 25 MG: 25 TABLET, FILM COATED ORAL at 08:52

## 2023-03-02 RX ADMIN — NITROGLYCERIN 0.4 MG: 0.4 TABLET SUBLINGUAL at 06:46

## 2023-03-02 RX ADMIN — NITROGLYCERIN 0.4 MG: 0.4 TABLET SUBLINGUAL at 06:35

## 2023-03-02 RX ADMIN — NITROGLYCERIN 0.4 MG: 0.4 TABLET SUBLINGUAL at 06:40

## 2023-03-02 RX ADMIN — ONDANSETRON 4 MG: 2 INJECTION INTRAMUSCULAR; INTRAVENOUS at 06:34

## 2023-03-02 RX ADMIN — ALPRAZOLAM 0.25 MG: 0.25 TABLET ORAL at 06:51

## 2023-03-02 RX ADMIN — ROSUVASTATIN 40 MG: 20 TABLET, FILM COATED ORAL at 20:32

## 2023-03-02 RX ADMIN — AMLODIPINE BESYLATE 5 MG: 5 TABLET ORAL at 11:47

## 2023-03-02 NOTE — PLAN OF CARE
Goal Outcome Evaluation:  Continuing to monitor in the ICU, family supportive at bedside, education provided, Provider notified of b/ood pressure and heart rate. Medication adjusted.

## 2023-03-02 NOTE — PROGRESS NOTES
Afternoon rounds:  Patient remarkably better, denies chest pain, states he feels much better.  Ambulated to restroom without difficulty.  Denies shortness of breath.  Still mildly tachycardic but improved.  Blood pressure 120s over 80s currently.  I discussed overall prognosis, and echo findings showing severe cardiomyopathy.    Hopeful for some degree of myocardial recovery with revascularization and optimal medical therapy.    Plan also discussed with Dr. Mancilla

## 2023-03-02 NOTE — PROGRESS NOTES
"INTENSIVIST   PROGRESS NOTE        SUBJECTIVE     Devon 43 y.o. male is followed for: No chief complaint on file.       STEMI involving LAD (HCC)    Hyperlipidemia LDL goal <70    Essential hypertension    Coronary artery disease involving native coronary artery of native heart with unstable angina pectoris (HCC)    As an Intensivist, we provide an integrated approach to the ICU patient and family, medical management of comorbid conditions, including but not limited to electrolytes, glycemic control, organ dysfunction, lead interdisciplinary rounds and coordinate the care with all other services, including those from other specialists.     Interval History:  POD: 1 Day Post-Op (LHC)    \"Rough night\"    Chest heaviness.    N/V    \"Panic attacks\"    Temp  Min: 98.3 °F (36.8 °C)  Max: 99.1 °F (37.3 °C)       History     Last Reviewed by Kam Mancilla MD on 3/1/2023 at  5:12 PM    Sections Reviewed    Medical, Family, Social Documentation      Problem list reviewed by Kam Mancilla MD on 3/1/2023 at  5:12 PM  Medicines reviewed by Kam Mancilla MD on 3/1/2023 at  5:12 PM  Allergies reviewed by Kam Mancilla MD on 3/1/2023 at  5:12 PM       The patient's relevant past medical, surgical and social history were reviewed and updated in Epic as appropriate.        OBJECTIVE     Vitals:  Temp: 99 °F (37.2 °C) (23 0800) Temp  Min: 98.3 °F (36.8 °C)  Max: 99.1 °F (37.3 °C)   Temp core:      BP: 116/87 (2330) BP  Min: 111/79  Max: 176/103   MAP (non-invasive) Noninvasive MAP (mmHg): 95 (23) Noninvasive MAP (mmHg)  Av.4  Min: 88  Max: 130   Pulse: 114 (23) Pulse  Min: 96  Max: 133   Resp: 18 (23 0800) Resp  Min: 16  Max: 20   SpO2: 95 % (23) SpO2  Min: 91 %  Max: 100 %   Device: room air (23 0800)    Flow Rate: 2 (23 1534) Flow (L/min)  Min: 2  Max: 2         23  1535 23  0905   Weight: 81.6 kg (179 lb 14.3 oz) 81.2 kg (179 lb)    "     Intake/Ouptut 24 hrs (7:00AM - 6:59 AM)  Intake & Output (last 3 days)       02/27 0701 02/28 0700 02/28 0701 03/01 0700 03/01 0701 03/02 0700 03/02 0701 03/03 0700    P.O.   480 240    I.V. (mL/kg)   100 (1.2)     Total Intake(mL/kg)   580 (7.1) 240 (3)    Urine (mL/kg/hr)   550 400 (1.2)    Emesis/NG output   0     Stool   0     Total Output   550 400    Net   +30 -160            Stool Unmeasured Occurrence   1 x     Emesis Unmeasured Occurrence   3 x              Physical Examination  Telemetry:  Rhythm: sinus tachycardia (03/02/23 1000)         Constitutional:  No acute distress.   Cardiovascular: RRR.    Respiratory: Normal breath sounds  No adventitious sounds.   Abdominal:  Soft with no tenderness.   Extremities: No Edema   Neurological:   Alert, Oriented, Cooperative.  Best Eye Response: 4-->(E4) spontaneous (03/02/23 1000)  Best Motor Response: 6-->(M6) obeys commands (03/02/23 1000)  Best Verbal Response: 5-->(V5) oriented (03/02/23 1000)  Adelina Coma Scale Score: 15 (03/02/23 1000)       Results Reviewed:  Laboratory  Microbiology  Radiology  Pathology    Hematology:  Results from last 7 days   Lab Units 03/01/23 2255 03/01/23  1510   WBC 10*3/mm3 12.05* 10.99*   HEMOGLOBIN g/dL 13.4 13.1   MCV fL 89.6 88.1   PLATELETS 10*3/mm3 212 248   NEUTROS ABS 10*3/mm3 9.58* 5.20   EOS ABS 10*3/mm3 0.02 0.13       Chemistry:  Estimated Creatinine Clearance: 150.9 mL/min (A) (by C-G formula based on SCr of 0.71 mg/dL (L)).    Results from last 7 days   Lab Units 03/01/23 2255 03/01/23  1554   SODIUM mmol/L 136 139   POTASSIUM mmol/L 3.9 3.1*   CHLORIDE mmol/L 100 103   CO2 mmol/L 24.0 17.0*   BUN mg/dL 15 21*   CREATININE mg/dL 0.71* 0.96   GLUCOSE mg/dL 169* 157*     Results from last 7 days   Lab Units 03/02/23  0349 03/01/23  2255 03/01/23  1554   CALCIUM mg/dL  --  9.0 8.9   MAGNESIUM mg/dL 2.6  --  1.7     Results from last 7 days   Lab Units 03/01/23  1554   BILIRUBIN mg/dL 0.6   AST (SGOT) U/L 14    ALT (SGPT) U/L 15   ALK PHOS U/L 51       Cardiac Labs:  Results from last 7 days   Lab Units 03/02/23  0349 03/01/23  1738 03/01/23  1554   PROBNP pg/mL  --   --  <5.0   HSTROP T ng/L >10,000* 6,565* 52*       Images:  CT Abdomen Pelvis Without Contrast    Result Date: 3/2/2023  No acute process within the chest, abdomen or pelvis. Electronically signed by:  Kali Azar D.O.  3/1/2023 10:24 PM Mountain Time    CT Chest Without Contrast Diagnostic    Result Date: 3/2/2023  No acute process within the chest, abdomen or pelvis. Electronically signed by:  Kali Azar D.O.  3/1/2023 10:24 PM Mountain Time    XR Chest 1 View    Result Date: 3/1/2023  1.No evidence for acute cardiopulmonary process. Electronically Signed: Manuel Spence  3/1/2023 6:46 PM EST  Workstation ID: [id number]      Results: Reviewed.  I reviewed the patient's new laboratory and imaging results.  I independently reviewed the patient's new images.    Medications: Reviewed.    Assessment   A/P     Hospital:  LOS: 1 day   ICU: 18h     Active Hospital Problems    Diagnosis  POA   • **STEMI involving left anterior descending coronary artery (HCC) [I21.02]  Yes   • Hyperlipidemia LDL goal <70 [E78.5]  Unknown   • Essential hypertension [I10]  Unknown   • Coronary artery disease involving native coronary artery of native heart with unstable angina pectoris (HCC) [I25.110]  Unknown     3/1/2023: Anterior STEMI, mid LAD 10 percent thrombotic occlusion status post OCT guided PCI with a 4.0 x 38 mm Xience KAVON, proximal LAD 30-40%, normal circumflex and RCA, LVEF 25-30% due to anterior apical and distal inferior akinesis, EDP 36 mmHg       Devon is a 43 y.o. male transferred from [facility] on 3/1/2023 withchest pain and anterior STEMI.    Substernal chest pain and pressure, described as a squeezing, crushing sensation in his substernal chest region rating to bilateral arms and bilateral jaw.  EMS performed field ECG which showed significant 4  "mm anterior lateral ST elevation with reciprocal depression.         Assessment/Management/Treatment Plan:    1. Anterior STEMI  a. [facility] 3/1/2023: mid LAD 10% thrombotic occlusion status post OCT guided PCI with a 4.0 x 38 mm Xience KAVON, proximal LAD 30-40%, normal circumflex and RCA, LVEF 25-30% due to anterior apical and distal inferior akinesis, EDP 36 mmHg  2. Cardiovascular  a. HTN ? Treatment with CCB  b. Dyslipidemia ? Treatment with statin.  c. CAD  3. Non-smoker  4. Endocrine  a. Body mass index is 22.98 kg/m². Normal: 18.5-24.9kg/m2  b. No history of T2 Diabetes    Lab Results   Lab Value Date/Time    HGBA1C 5.70 (H) 03/02/2023 0349           Diet: Diet: Cardiac Diets; Healthy Heart (2-3 Na+); Texture: Regular Texture (IDDSI 7); Fluid Consistency: Thin (IDDSI 0)   Advance Directives: Code Status and Medical Interventions:   Ordered at: 03/01/23 1635     Level Of Support Discussed With:    Patient     Code Status (Patient has no pulse and is not breathing):    CPR (Attempt to Resuscitate)     Medical Interventions (Patient has pulse or is breathing):    Full Support     Release to patient:    Routine Release        DVT prophylaxis:  No DVT prophylaxis order currently exists.     In brief:  1. Monitor electrolytes.  2. CT Chest and CT Abdomen/Pelvis during the night were negative.  3. May need a \"relook angiography\" as per Cardiology  4. ECHO  5. Disposition: Keep in ICU.    Plan of care and goals reviewed during interdisciplinary rounds.  I discussed the patient's findings and my recommendations with patient and nursing staff    MDM:    Problem(s) High due to: Acute illness(es) that may pose a threat to life or bodily function  Risk: High due to: drug(s) requiring intensive monitoring for toxicity    High      [x] Primary Attending Intensive Care Medicine - Nutrition Support   [x] Consultant    "

## 2023-03-02 NOTE — PROGRESS NOTES
"  [facility] at [facility]  PROGRESS NOTE    Date of Admission: 3/1/2023  Date of Service: 03/02/23    Primary Care Physician: Provider, No Known    Chief Complaint: f/u anterior STEMI  Problem List:   STEMI involving LAD (HCC)    Hyperlipidemia LDL goal <70    Essential hypertension    Coronary artery disease involving native coronary artery of native heart with unstable angina pectoris (HCC)      Subjective      HPI: Patient reports being uncomfortable all night, reports anxiety, does not have the crushing chest pain he had yesterday prior to arrival but still uncomfortable, could not sleep well overnight.  No shortness of breath, on room air currently.  Vital stable overnight.      Objective   Vitals: /94   Pulse 111   Temp 99.1 °F (37.3 °C) (Axillary)   Resp 18   Ht 188 cm (74\")   Wt 81.6 kg (179 lb 14.3 oz)   SpO2 98%   BMI 23.10 kg/m²     Physical Exam:  General Appearance:   · well developed  · well nourished  HENT:   · oropharynx moist  · lips not cyanotic  Neck:  · thyroid not enlarged  · supple  Respiratory:  · no respiratory distress  · normal breath sounds  · no rales  Cardiovascular:  · no jugular venous distention  · regular rhythm  · apical impulse normal  · S1 normal, S2 normal  · no S3, no S4   · no murmur  ·  Slight rub, no thrill  · carotid pulses normal; no bruit  · pedal pulses normal  · lower extremity edema: none    Right radial 2+ pulse, no evidence of ecchymosis or hematoma  Gastrointestinal:   · bowel sounds normal  · non-tender  · no hepatomegaly, no splenomegaly  Musculoskeletal:  · no clubbing of fingers.   · normocephalic, head atraumatic  Skin:   · warm  · dry  Psychiatric:  · judgement and insight appropriate  · normal mood and affect      Results:  Results from last 7 days   Lab Units 03/01/23  2255 03/01/23  1510   WBC 10*3/mm3 12.05* 10.99*   HEMOGLOBIN g/dL 13.4 13.1   HEMATOCRIT % 38.6 37.9   PLATELETS 10*3/mm3 212 248     Results from last 7 " days   Lab Units 03/01/23  2255 03/01/23  1554   SODIUM mmol/L 136 139   POTASSIUM mmol/L 3.9 3.1*   CHLORIDE mmol/L 100 103   CO2 mmol/L 24.0 17.0*   BUN mg/dL 15 21*   CREATININE mg/dL 0.71* 0.96   GLUCOSE mg/dL 169* 157*      Lab Results   Component Value Date    CHOL 232 (H) 03/02/2023    TRIG 122 03/02/2023    HDL 54 03/02/2023     (H) 03/02/2023    AST 14 03/01/2023    ALT 15 03/01/2023     Results from last 7 days   Lab Units 03/02/23  0349   HEMOGLOBIN A1C % 5.70*     Results from last 7 days   Lab Units 03/02/23  0349   CHOLESTEROL mg/dL 232*   TRIGLYCERIDES mg/dL 122   HDL CHOL mg/dL 54   LDL CHOL mg/dL 156*     Results from last 7 days   Lab Units 03/01/23  1554   TSH uIU/mL 3.340             Results from last 7 days   Lab Units 03/02/23  0349 03/01/23  1738 03/01/23  1554   HSTROP T ng/L >10,000* 6,565* 52*     Results from last 7 days   Lab Units 03/01/23  1554   PROBNP pg/mL <5.0         Intake/Output Summary (Last 24 hours) at 3/2/2023 0830  Last data filed at 3/2/2023 0500  Gross per 24 hour   Intake 580 ml   Output 550 ml   Net 30 ml       I personally reviewed the patient's EKG/Telemetry data    Radiology Data:   [facility] 3/1/23:  Conclusion       •  Anterior STEMI involving mid % thrombotic occlusion status post OCT guided PCI with a 4.0 x 38 mm Xience KAVON restoring JEREMIE-3 flow.  •  Normal RCA and left circumflex  •  LVEF 25-30% due to anterior, apical, and distal RCA akinesis  •  LVEDP 36 mmHg.     Recommendations    •     Dual antiplatelet therapy for one year.Aspirin and Brilinta, start metoprolol, losartan, and atorvastatin.         Current Medications:  aspirin, 81 mg, Oral, Daily  carvedilol, 6.25 mg, Oral, Q12H  losartan, 25 mg, Oral, Daily  pharmacy consult - MTM, , Does not apply, Daily  rosuvastatin, 40 mg, Oral, Nightly  ticagrelor, 90 mg, Oral, BID           Assessment and Plan:     1.  Anterior STEMI:  - [facility] 3/1 with 100% mid LAD thrombotic occlusion s/p KAVON, normal RCA and  LCx  - EF 25-30%   - continue ASA, Brilinta, BB, statin     Large anterior myocardial infarction, EKG with expected anterior lateral Q waves and persistent ST elevation, there is some slight increase in ST elevation in the inferolateral leads, possibility of Dressler's syndrome, however if pain worsens or patient becomes hemodynamically unstable or worsening EKG changes occur we will take him back emergently for relook angiography.    Echocardiogram pending     2.  Mixed hyperlipidemia:    Started Crestor 40mg (he was intolerant to atorvastatin previously)  He stopped taking statins 1 year ago although they were prescribed     3.  Essential hypertension:  Goal blood pressure less than 130/80  Beta-blocker and ARB prescribed  Amlodipine started overnight by intensivist    Keep in ICU 1 more day given large anterior MI, high risk for complications, ongoing pleuritic type chest pain.    Ambulate today     Electronically signed by Lizabeth Live PA-C, 03/02/23, 8:32 AM EST.    I have seen and examined the patient, performing a face-to-face diagnostic evaluation with plan of care reviewed and developed with the Advanced Practice Clinician and nursing staff. I have addended and modified the above history of present illness, physical examination, and assessment and plan to reflect my findings and impressions. All medical decision making performed by Dr. Ramos.    Arsenio Ramos MD, [facility]  03/02/23

## 2023-03-02 NOTE — CASE MANAGEMENT/SOCIAL WORK
Discharge Planning Assessment  [facility]     Patient Name: Devon Smith  MRN: [id number]  Today's Date: 3/2/2023    Admit Date: 3/1/2023    Plan: Home   Discharge Needs Assessment     Row Name 03/02/23 1257       Living Environment    People in Home spouse    Current Living Arrangements home    Primary Care Provided by self    Provides Primary Care For no one    Family Caregiver if Needed spouse    Quality of Family Relationships supportive    Able to Return to Prior Arrangements yes       Resource/Environmental Concerns    Resource/Environmental Concerns none    Transportation Concerns none       Transition Planning    Patient/Family Anticipates Transition to home with family    Patient/Family Anticipated Services at Transition none    Transportation Anticipated family or friend will provide       Discharge Needs Assessment    Readmission Within the Last 30 Days no previous admission in last 30 days    Equipment Currently Used at Home none    Concerns to be Addressed denies needs/concerns at this time    Anticipated Changes Related to Illness none    Equipment Needed After Discharge none               Discharge Plan     Row Name 03/02/23 1253       Plan    Plan Home    Patient/Family in Agreement with Plan yes    Plan Comments Spoke with patient and wife at bedside.  Patient resides in [facility] and lives with his wife.  Prior to admission patient states he was independent with ADL's and mobility.  Patient does not have any DME and is not current with home health.  Patient states his current PCP is Justo Miguel and that he has [place] [employer] for his insurance.  Patient plans to return home upon discharge and denies any needs at this time.  CM will continue to follow.              Continued Care and Services - Admitted Since 3/1/2023    Coordination has not been started for this encounter.       Expected Discharge Date and Time     Expected Discharge Date Expected Discharge Time    Mar 3, 2023           Demographic Summary     Row Name 03/02/23 1257       General Information    Admission Type inpatient    Arrived From home    Referral Source admission list    Reason for Consult discharge planning    Preferred Language English       Contact Information    Permission Granted to Share Info With                Functional Status    No documentation.                Psychosocial    No documentation.                Abuse/Neglect    No documentation.                Legal    No documentation.                Substance Abuse    No documentation.                Patient Forms    No documentation.                   Lynda Leyva RN

## 2023-03-03 ENCOUNTER — TRANSCRIBE ORDERS (OUTPATIENT)
Dept: CARDIAC REHAB | Facility: HOSPITAL | Age: 43
End: 2023-03-03
Payer: COMMERCIAL

## 2023-03-03 ENCOUNTER — APPOINTMENT (OUTPATIENT)
Dept: GENERAL RADIOLOGY | Facility: HOSPITAL | Age: 43
DRG: 246 | End: 2023-03-03
Payer: COMMERCIAL

## 2023-03-03 DIAGNOSIS — I21.02 STEMI INVOLVING LEFT ANTERIOR DESCENDING CORONARY ARTERY: Primary | ICD-10-CM

## 2023-03-03 DIAGNOSIS — I21.3 ST ELEVATION MYOCARDIAL INFARCTION (STEMI), UNSPECIFIED ARTERY: Primary | ICD-10-CM

## 2023-03-03 PROBLEM — I50.21 ACUTE SYSTOLIC HEART FAILURE: Status: ACTIVE | Noted: 2023-03-03

## 2023-03-03 LAB
ANION GAP SERPL CALCULATED.3IONS-SCNC: 9 MMOL/L (ref 5–15)
BASOPHILS # BLD AUTO: 0.04 10*3/MM3 (ref 0–0.2)
BASOPHILS NFR BLD AUTO: 0.3 % (ref 0–1.5)
BUN SERPL-MCNC: 17 MG/DL (ref 6–20)
BUN/CREAT SERPL: 18.7 (ref 7–25)
CALCIUM SPEC-SCNC: 8.7 MG/DL (ref 8.6–10.5)
CHLORIDE SERPL-SCNC: 103 MMOL/L (ref 98–107)
CO2 SERPL-SCNC: 25 MMOL/L (ref 22–29)
CREAT SERPL-MCNC: 0.91 MG/DL (ref 0.76–1.27)
DEPRECATED RDW RBC AUTO: 41.3 FL (ref 37–54)
EGFRCR SERPLBLD CKD-EPI 2021: 105.9 ML/MIN/1.73
EOSINOPHIL # BLD AUTO: 0.09 10*3/MM3 (ref 0–0.4)
EOSINOPHIL NFR BLD AUTO: 0.7 % (ref 0.3–6.2)
ERYTHROCYTE [DISTWIDTH] IN BLOOD BY AUTOMATED COUNT: 12.5 % (ref 12.3–15.4)
GLUCOSE SERPL-MCNC: 132 MG/DL (ref 65–99)
HCT VFR BLD AUTO: 39.2 % (ref 37.5–51)
HGB BLD-MCNC: 13.2 G/DL (ref 13–17.7)
IMM GRANULOCYTES # BLD AUTO: 0.03 10*3/MM3 (ref 0–0.05)
IMM GRANULOCYTES NFR BLD AUTO: 0.2 % (ref 0–0.5)
LYMPHOCYTES # BLD AUTO: 1.73 10*3/MM3 (ref 0.7–3.1)
LYMPHOCYTES NFR BLD AUTO: 13.5 % (ref 19.6–45.3)
MAGNESIUM SERPL-MCNC: 2.2 MG/DL (ref 1.6–2.6)
MCH RBC QN AUTO: 30.6 PG (ref 26.6–33)
MCHC RBC AUTO-ENTMCNC: 33.7 G/DL (ref 31.5–35.7)
MCV RBC AUTO: 90.7 FL (ref 79–97)
MONOCYTES # BLD AUTO: 1.87 10*3/MM3 (ref 0.1–0.9)
MONOCYTES NFR BLD AUTO: 14.6 % (ref 5–12)
NEUTROPHILS NFR BLD AUTO: 70.7 % (ref 42.7–76)
NEUTROPHILS NFR BLD AUTO: 9.09 10*3/MM3 (ref 1.7–7)
NRBC BLD AUTO-RTO: 0 /100 WBC (ref 0–0.2)
PLATELET # BLD AUTO: 208 10*3/MM3 (ref 140–450)
PMV BLD AUTO: 9.5 FL (ref 6–12)
POTASSIUM SERPL-SCNC: 4.3 MMOL/L (ref 3.5–5.2)
QT INTERVAL: 318 MS
QTC INTERVAL: 443 MS
RBC # BLD AUTO: 4.32 10*6/MM3 (ref 4.14–5.8)
SODIUM SERPL-SCNC: 137 MMOL/L (ref 136–145)
TROPONIN T SERPL HS-MCNC: 5599 NG/L
WBC NRBC COR # BLD: 12.85 10*3/MM3 (ref 3.4–10.8)

## 2023-03-03 PROCEDURE — 71045 X-RAY EXAM CHEST 1 VIEW: CPT

## 2023-03-03 PROCEDURE — 84484 ASSAY OF TROPONIN QUANT: CPT | Performed by: INTERNAL MEDICINE

## 2023-03-03 PROCEDURE — 99232 SBSQ HOSP IP/OBS MODERATE 35: CPT | Performed by: INTERNAL MEDICINE

## 2023-03-03 PROCEDURE — 83735 ASSAY OF MAGNESIUM: CPT | Performed by: INTERNAL MEDICINE

## 2023-03-03 PROCEDURE — 25010000002 KETOROLAC TROMETHAMINE PER 15 MG: Performed by: NURSE PRACTITIONER

## 2023-03-03 PROCEDURE — 80048 BASIC METABOLIC PNL TOTAL CA: CPT | Performed by: INTERNAL MEDICINE

## 2023-03-03 PROCEDURE — 85025 COMPLETE CBC W/AUTO DIFF WBC: CPT | Performed by: INTERNAL MEDICINE

## 2023-03-03 RX ORDER — ASPIRIN 81 MG/1
81 TABLET ORAL DAILY
Qty: 90 TABLET | Refills: 3 | Status: SHIPPED | OUTPATIENT
Start: 2023-03-04

## 2023-03-03 RX ORDER — NITROGLYCERIN 0.4 MG/1
0.4 TABLET SUBLINGUAL
Qty: 25 TABLET | Refills: 12 | Status: SHIPPED | OUTPATIENT
Start: 2023-03-03

## 2023-03-03 RX ORDER — ROSUVASTATIN CALCIUM 40 MG/1
40 TABLET, COATED ORAL NIGHTLY
Qty: 90 TABLET | Refills: 3 | Status: SHIPPED | OUTPATIENT
Start: 2023-03-03

## 2023-03-03 RX ORDER — CARVEDILOL 6.25 MG/1
6.25 TABLET ORAL EVERY 12 HOURS SCHEDULED
Status: DISCONTINUED | OUTPATIENT
Start: 2023-03-03 | End: 2023-03-04 | Stop reason: HOSPADM

## 2023-03-03 RX ORDER — CARVEDILOL 6.25 MG/1
6.25 TABLET ORAL EVERY 12 HOURS SCHEDULED
Qty: 60 TABLET | Refills: 5 | Status: SHIPPED | OUTPATIENT
Start: 2023-03-03 | End: 2023-03-15

## 2023-03-03 RX ADMIN — ACETAMINOPHEN 325MG 650 MG: 325 TABLET ORAL at 00:32

## 2023-03-03 RX ADMIN — ASPIRIN 81 MG: 81 TABLET, COATED ORAL at 10:21

## 2023-03-03 RX ADMIN — ROSUVASTATIN 40 MG: 20 TABLET, FILM COATED ORAL at 20:18

## 2023-03-03 RX ADMIN — CARVEDILOL 6.25 MG: 6.25 TABLET, FILM COATED ORAL at 20:18

## 2023-03-03 RX ADMIN — ALPRAZOLAM 0.25 MG: 0.25 TABLET ORAL at 19:30

## 2023-03-03 RX ADMIN — SACUBITRIL AND VALSARTAN 1 TABLET: 24; 26 TABLET, FILM COATED ORAL at 20:18

## 2023-03-03 RX ADMIN — TICAGRELOR 90 MG: 90 TABLET ORAL at 20:18

## 2023-03-03 RX ADMIN — KETOROLAC TROMETHAMINE 15 MG: 15 INJECTION, SOLUTION INTRAMUSCULAR; INTRAVENOUS at 01:37

## 2023-03-03 RX ADMIN — CARVEDILOL 6.25 MG: 6.25 TABLET, FILM COATED ORAL at 10:38

## 2023-03-03 RX ADMIN — TICAGRELOR 90 MG: 90 TABLET ORAL at 10:21

## 2023-03-03 NOTE — NURSING NOTE
Pt. Referred for Phase II Cardiac Rehab. Staff discussed benefits of exercise, program protocol, and educational material provided. Teach back verified.  Patient scheduled for orientation at [facility] on March 28 at 0900.

## 2023-03-03 NOTE — PROGRESS NOTES
INTENSIVIST   PROGRESS NOTE        SUBJECTIVE     Devon 43 y.o. male is followed for: No chief complaint on file.       STEMI involving LAD (HCC)    Hyperlipidemia LDL goal <70    Essential hypertension    Coronary artery disease involving native coronary artery of native heart with unstable angina pectoris (HCC)    As an Intensivist, we provide an integrated approach to the ICU patient and family, medical management of comorbid conditions, including but not limited to electrolytes, glycemic control, organ dysfunction, lead interdisciplinary rounds and coordinate the care with all other services, including those from other specialists.     Interval History:  POD: 2 Days Post-Op ([facility])    Much better.  No chest pain.  BP marginally low.    Temp  Min: 98.1 °F (36.7 °C)  Max: 98.6 °F (37 °C)       History     Last Reviewed by Kam Mancilla MD on 3/1/2023 at  5:12 PM    Sections Reviewed    Medical, Family, Social Documentation      Problem list reviewed by Kam Mancilla MD on 3/1/2023 at  5:12 PM  Medicines reviewed by Kam Mancilla MD on 3/1/2023 at  5:12 PM  Allergies reviewed by Kam Mancilla MD on 3/1/2023 at  5:12 PM       The patient's relevant past medical, surgical and social history were reviewed and updated in Epic as appropriate.        OBJECTIVE     Vitals:  Temp: 98.2 °F (36.8 °C) (23 0800) Temp  Min: 98.1 °F (36.7 °C)  Max: 98.6 °F (37 °C)   Temp core:      BP: 92/67 (23 1200) BP  Min: 78/57  Max: 127/96   MAP (non-invasive) Noninvasive MAP (mmHg): 77 (23 1200) Noninvasive MAP (mmHg)  Av.2  Min: 65  Max: 130   Pulse: 93 (23 1200) Pulse  Min: 84  Max: 125   Resp: 18 (23 1015) Resp  Min: 16  Max: 20   SpO2: 96 % (23 1200) SpO2  Min: 94 %  Max: 99 %   Device: room air (23 1100)    Flow Rate: 2 (23 1534) No data recorded         23  15323  0905   Weight: 81.6 kg (179 lb 14.3 oz) 81.2 kg (179 lb)        Intake/Ouptut 24 hrs (7:00AM -  6:59 AM)  Intake & Output (last 3 days)       02/28 0701 03/01 0700 03/01 0701 03/02 0700 03/02 0701 03/03 0700 03/03 0701 03/04 0700    P.O.  480 480 600    I.V. (mL/kg)  100 (1.2)      Total Intake(mL/kg)  580 (7.1) 480 (5.9) 600 (7.4)    Urine (mL/kg/hr)  550 2050 (1.1)     Emesis/NG output  0      Stool  0      Total Output  550 2050     Net  +30 -1570 +600            Urine Unmeasured Occurrence   1 x 2 x    Stool Unmeasured Occurrence  1 x  1 x    Emesis Unmeasured Occurrence  3 x               Physical Examination  Telemetry:  Rhythm: sinus tachycardia (03/03/23 1200)         Constitutional:  No acute distress.   Cardiovascular: RRR.    Respiratory: Normal breath sounds  No adventitious sounds.   Abdominal:  Soft with no tenderness.   Extremities: No Edema   Neurological:   Alert, Oriented, Cooperative.  Best Eye Response: 4-->(E4) spontaneous (03/03/23 1200)  Best Motor Response: 6-->(M6) obeys commands (03/03/23 1200)  Best Verbal Response: 5-->(V5) oriented (03/03/23 1200)  [place] Coma Scale Score: 15 (03/03/23 1200)       Results Reviewed:  Laboratory  Microbiology  Radiology  Pathology    Hematology:  Results from last 7 days   Lab Units 03/03/23  0334 03/01/23  2255   WBC 10*3/mm3 12.85* 12.05*   HEMOGLOBIN g/dL 13.2 13.4   MCV fL 90.7 89.6   PLATELETS 10*3/mm3 208 212   NEUTROS ABS 10*3/mm3 9.09* 9.58*   EOS ABS 10*3/mm3 0.09 0.02       Chemistry:  Estimated Creatinine Clearance: 117.7 mL/min (by C-G formula based on SCr of 0.91 mg/dL).    Results from last 7 days   Lab Units 03/03/23  0334 03/02/23  0349   SODIUM mmol/L 137 137   POTASSIUM mmol/L 4.3 4.5   CHLORIDE mmol/L 103 99   CO2 mmol/L 25.0 23.0   BUN mg/dL 17 12   CREATININE mg/dL 0.91 0.87   GLUCOSE mg/dL 132* 154*     Results from last 7 days   Lab Units 03/03/23  0334 03/02/23  0349   CALCIUM mg/dL 8.7 9.1   MAGNESIUM mg/dL 2.2 2.6     Cardiac Labs:  Results from last 7 days   Lab Units 03/03/23  0334 03/02/23  0349 03/01/23  1738  03/01/23  1554   PROBNP pg/mL  --   --   --  <5.0   HSTROP T ng/L 5,599* >10,000* 6,565* 52*       Images:  CT Abdomen Pelvis Without Contrast    Result Date: 3/2/2023  No acute process within the chest, abdomen or pelvis. Electronically signed by:  Kali Azar D.O.  3/1/2023 10:24 PM Mountain Time    CT Chest Without Contrast Diagnostic    Result Date: 3/2/2023  No acute process within the chest, abdomen or pelvis. Electronically signed by:  Kali Azar D.O.  3/1/2023 10:24 PM Mountain Time    XR Chest 1 View    Result Date: 3/3/2023  Impression: No acute cardiopulmonary process. Electronically Signed: Gilbert Real  3/3/2023 7:33 AM EST  Workstation ID: [id number]    XR Chest 1 View    Result Date: 3/1/2023  1.No evidence for acute cardiopulmonary process. Electronically Signed: Manuel Spence  3/1/2023 6:46 PM EST  Workstation ID: [id number]    Results for orders placed during the hospital encounter of 03/01/23    Adult Transthoracic Echo Complete W/ Cont if Necessary Per Protocol    Interpretation Summary  •  Left ventricular systolic function is severely decreased. Calculated left ventricular EF = 15.7% Left ventricular ejection fraction appears to be less than 20%.  •  Left ventricular wall thickness is consistent with mild septal asymmetric hypertrophy.  •  The following left ventricular wall segments are hypokinetic: basal inferoseptal and basal inferoseptal. The following left ventricular wall segments are akinetic: mid anterior, apical anterior, mid anterolateral, apical lateral, apical inferior, mid inferior, apical septal, mid inferoseptal, apex and mid anteroseptal. The following left ventricular wall segments are hyperkinetic: basal anterior, basal anterolateral and basal inferior.  •  Left ventricular diastolic function is consistent with (grade II w/high LAP) pseudonormalization.  •  There is a trivial pericardial effusion.  •  No significant structural or functional valvular disease.  •  No  evidence of LV thrombus with contrast-enhanced images.    Results: Reviewed.  I reviewed the patient's new laboratory and imaging results.  I independently reviewed the patient's new images.    Medications: Reviewed.    Assessment   A/P     Hospital:  LOS: 2 days   ICU: 1d 21h     Active Hospital Problems    Diagnosis  POA   • **STEMI involving left anterior descending coronary artery (HCC) [I21.02]  Yes   • Hyperlipidemia LDL goal <70 [E78.5]  Unknown   • Essential hypertension [I10]  Unknown   • Coronary artery disease involving native coronary artery of native heart with unstable angina pectoris (HCC) [I25.110]  Unknown     3/1/2023: Anterior STEMI, mid LAD 10 percent thrombotic occlusion status post OCT guided PCI with a 4.0 x 38 mm Xience KAVON, proximal LAD 30-40%, normal circumflex and RCA, LVEF 25-30% due to anterior apical and distal inferior akinesis, EDP 36 mmHg       Devon is a 43 y.o. male transferred from [facility] on 3/1/2023 withchest pain and anterior STEMI.    Substernal chest pain and pressure, described as a squeezing, crushing sensation in his substernal chest region rating to bilateral arms and bilateral jaw.  EMS performed field ECG which showed significant 4 mm anterior lateral ST elevation with reciprocal depression.         Assessment/Management/Treatment Plan:    1. Anterior STEMI  a. [facility] 3/1/2023: mid LAD 10% thrombotic occlusion status post OCT guided PCI with a 4.0 x 38 mm Xience KAVON, proximal LAD 30-40%, normal circumflex and RCA, LVEF 25-30% due to anterior apical and distal inferior akinesis, EDP 36 mmHg  2. Cardiovascular  a. HTN ? Treatment with CCB  b. Dyslipidemia ? Treatment with statin.  c. CAD  3. Non-smoker  4. Endocrine  a. Body mass index is 22.98 kg/m². Normal: 18.5-24.9kg/m2  b. No history of T2 Diabetes    Lab Results   Lab Value Date/Time    HGBA1C 5.70 (H) 03/02/2023 0349           Diet: Diet: Cardiac Diets; Healthy Heart (2-3 Na+); Texture: Regular Texture  (IDDSI 7); Fluid Consistency: Thin (IDDSI 0)   Advance Directives: Code Status and Medical Interventions:   Ordered at: 03/01/23 1635     Level Of Support Discussed With:    Patient     Code Status (Patient has no pulse and is not breathing):    CPR (Attempt to Resuscitate)     Medical Interventions (Patient has pulse or is breathing):    Full Support     Release to patient:    Routine Release        DVT prophylaxis:  No DVT prophylaxis order currently exists.     In brief:  1. ECHO:  EF 15.7%  2. Probably discharge home today if able to get a Life Vest, and BP OK.  3. Disposition: Discharge Home today?    Plan of care and goals reviewed during interdisciplinary rounds.  I discussed the patient's findings and my recommendations with patient and nursing staff    MDM:    Problem(s) High due to: Acute illness(es) that may pose a threat to life or bodily function  Data: Moderate due to: Review or results of each unique test  Risk: Moderate due to: prescription drug management    Moderate      [] Primary Attending Intensive Care Medicine - Nutrition Support   [x] Consultant

## 2023-03-03 NOTE — PLAN OF CARE
Problem: Adult Inpatient Plan of Care  Goal: Plan of Care Review  Outcome: Ongoing, Progressing  Flowsheets (Taken 3/3/2023 0440)  Progress: improving  Plan of Care Reviewed With: patient  Outcome Evaluation: Patient rested well tonight. Patient remains A&Ox4. Patient complained of intermittent leg and hip pain (patient attributes this to inactivity) and pain medication improved it. Patient denies chest pain. Continue to monitor closely, continue with current plan of care.

## 2023-03-03 NOTE — PROGRESS NOTES
Order received for Phase II Cardiac Rehab. Pt. Was with nurse at the time of consultation. Staff will follow up at a more convenient time.

## 2023-03-03 NOTE — PROGRESS NOTES
"  [facility] at [facility]  PROGRESS NOTE    Date of Admission: 3/1/2023  Date of Service: 03/03/23    Primary Care Physician: Provider, No Known    Chief Complaint: f/u anterior STEMI  Problem List:   STEMI involving LAD (HCC)    Hyperlipidemia LDL goal <70    Essential hypertension    Coronary artery disease involving native coronary artery of native heart with unstable angina pectoris (HCC)      Subjective      HPI: Denies chest pain or shortness of breath, blood pressure lower as well as heart rate this morning.      Objective   Vitals: BP (!) 84/61 (BP Location: Right arm)   Pulse 86   Temp 98.2 °F (36.8 °C) (Oral)   Resp 18   Ht 188 cm (74\")   Wt 81.2 kg (179 lb)   SpO2 96%   BMI 22.98 kg/m²     Physical Exam:  General Appearance:   · well developed  · well nourished  Neck:  · thyroid not enlarged  · supple  Respiratory:  · no respiratory distress  · normal breath sounds  · no rales  Cardiovascular:  · no jugular venous distention  · regular rhythm  · apical impulse normal  · S1 normal, S2 normal  · no S3, no S4   · no murmur  · no rub, no thrill  · carotid pulses normal; no bruit  · pedal pulses normal  · lower extremity edema: none    Skin:   warm, dry      Results:  Results from last 7 days   Lab Units 03/03/23  0334 03/01/23 2255 03/01/23  1510   WBC 10*3/mm3 12.85* 12.05* 10.99*   HEMOGLOBIN g/dL 13.2 13.4 13.1   HEMATOCRIT % 39.2 38.6 37.9   PLATELETS 10*3/mm3 208 212 248     Results from last 7 days   Lab Units 03/03/23  0334 03/02/23 0349 03/01/23 2255   SODIUM mmol/L 137 137 136   POTASSIUM mmol/L 4.3 4.5 3.9   CHLORIDE mmol/L 103 99 100   CO2 mmol/L 25.0 23.0 24.0   BUN mg/dL 17 12 15   CREATININE mg/dL 0.91 0.87 0.71*   GLUCOSE mg/dL 132* 154* 169*      Lab Results   Component Value Date    CHOL 232 (H) 03/02/2023    TRIG 122 03/02/2023    HDL 54 03/02/2023     (H) 03/02/2023    AST 14 03/01/2023    ALT 15 03/01/2023     Results from last 7 days   Lab Units " 03/02/23  0349   HEMOGLOBIN A1C % 5.70*     Results from last 7 days   Lab Units 03/02/23  0349   CHOLESTEROL mg/dL 232*   TRIGLYCERIDES mg/dL 122   HDL CHOL mg/dL 54   LDL CHOL mg/dL 156*     Results from last 7 days   Lab Units 03/01/23  1554   TSH uIU/mL 3.340             Results from last 7 days   Lab Units 03/03/23  0334 03/02/23  0349 03/01/23  1738   HSTROP T ng/L 5,599* >10,000* 6,565*     Results from last 7 days   Lab Units 03/01/23  1554   PROBNP pg/mL <5.0         Intake/Output Summary (Last 24 hours) at 3/3/2023 0859  Last data filed at 3/3/2023 0053  Gross per 24 hour   Intake 240 ml   Output 1650 ml   Net -1410 ml       I personally reviewed the patient's EKG/Telemetry data    Radiology Data:   Echo 3/2/23  Interpretation Summary       •  Left ventricular systolic function is severely decreased. Calculated left ventricular EF = 15.7% Left ventricular ejection fraction appears to be less than 20%.  •  Left ventricular wall thickness is consistent with mild septal asymmetric hypertrophy.  •  The following left ventricular wall segments are hypokinetic: basal inferoseptal and basal inferoseptal. The following left ventricular wall segments are akinetic: mid anterior, apical anterior, mid anterolateral, apical lateral, apical inferior, mid inferior, apical septal, mid inferoseptal, apex and mid anteroseptal. The following left ventricular wall segments are hyperkinetic: basal anterior, basal anterolateral and basal inferior.  •  Left ventricular diastolic function is consistent with (grade II w/high LAP) pseudonormalization.  •  There is a trivial pericardial effusion.  •  No significant structural or functional valvular disease.  •  No evidence of LV thrombus with contrast-enhanced images.      Current Medications:  aspirin, 81 mg, Oral, Daily  carvedilol, 6.25 mg, Oral, Q12H  losartan, 25 mg, Oral, Daily  pharmacy consult - MTM, , Does not apply, Daily  rosuvastatin, 40 mg, Oral, Nightly  ticagrelor, 90  mg, Oral, BID           Assessment and Plan:     1.  Anterior STEMI:  - [facility] 3/1 with 100% mid LAD thrombotic occlusion s/p KAVON, normal RCA and LCx  - EF 15%, no LV thrombus   - continue ASA, Brilinta, BB, statin      No chest pain currently, needs LifeVest prior to discharge     2.  Mixed hyperlipidemia:    Started Crestor 40mg (he was intolerant to atorvastatin previously)  He stopped taking statins 1 year ago although they were prescribed     3.  Essential hypertension:  Goal blood pressure less than 130/80  Beta-blocker and ARB prescribed  Now with hypotension     4.  New ischemic cardiomyopathy EF 15%  Continue carvedilol at lower dose due to hypotension  Change losartan to Entresto low-dose, with hold parameters to hold for systolic less than 100  Blood pressure too low for spironolactone  Start Jardiance at first heart failure follow-up visit in 1-2 weeks     Monitor overnight due to hypotension if stable tomorrow and LifeVest is on, he may be discharged home with follow-up in heart valve in 1-2 weeks and follow-up with Lizabeth Live in 4 to 6 weeks    Electronically signed by Lizabeth Live PA-C, 03/03/23, 9:01 AM EST.    I have seen and examined the patient, performing a face-to-face diagnostic evaluation with plan of care reviewed and developed with the Advanced Practice Clinician and nursing staff. I have addended and modified the above history of present illness, physical examination, and assessment and plan to reflect my findings and impressions. All medical decision making performed by Dr. Ramos.    Arsenio Ramos MD, [facility]  03/03/23

## 2023-03-03 NOTE — CASE MANAGEMENT/SOCIAL WORK
Continued Stay Note  ANDREY Hines     Patient Name: Devon Smith  MRN: [id number]  Today's Date: 3/3/2023    Admit Date: 3/1/2023    Plan: Home   Discharge Plan     Row Name 03/03/23 1225       Plan    Plan Home    Patient/Family in Agreement with Plan yes    Plan Comments Received a call from Michelle with [employer] and she needs a copy of patients current insurance.  Spoke with patient at bedside and he is brother is on his way with his card.  Once brother arrived, copy of card emailed to Michelle with [employer].  Spoke again with Michelle on the phone and updated that card had been emailed to her and she will let CM know if it doesn't come through.  Discharge plan is home once a lifevest has been approved and secured.  CM will continue to follow.    Final Discharge Disposition Code 01 - home or self-care               Discharge Codes    No documentation.               Expected Discharge Date and Time     Expected Discharge Date Expected Discharge Time    Mar 3, 2023             Lynda Leyva RN

## 2023-03-04 ENCOUNTER — READMISSION MANAGEMENT (OUTPATIENT)
Dept: CALL CENTER | Facility: HOSPITAL | Age: 43
End: 2023-03-04
Payer: COMMERCIAL

## 2023-03-04 VITALS
HEIGHT: 74 IN | OXYGEN SATURATION: 99 % | WEIGHT: 179 LBS | DIASTOLIC BLOOD PRESSURE: 94 MMHG | SYSTOLIC BLOOD PRESSURE: 117 MMHG | BODY MASS INDEX: 22.97 KG/M2 | TEMPERATURE: 98.1 F | RESPIRATION RATE: 20 BRPM | HEART RATE: 100 BPM

## 2023-03-04 PROCEDURE — 99232 SBSQ HOSP IP/OBS MODERATE 35: CPT | Performed by: INTERNAL MEDICINE

## 2023-03-04 PROCEDURE — 99239 HOSP IP/OBS DSCHRG MGMT >30: CPT | Performed by: INTERNAL MEDICINE

## 2023-03-04 RX ORDER — ESCITALOPRAM OXALATE 10 MG/1
10 TABLET ORAL DAILY
Qty: 30 TABLET | Refills: 1 | Status: SHIPPED | OUTPATIENT
Start: 2023-03-04

## 2023-03-04 RX ORDER — HYDROXYZINE HYDROCHLORIDE 25 MG/1
25 TABLET, FILM COATED ORAL EVERY 8 HOURS PRN
Qty: 30 TABLET | Refills: 3 | Status: SHIPPED | OUTPATIENT
Start: 2023-03-04

## 2023-03-04 RX ADMIN — TICAGRELOR 90 MG: 90 TABLET ORAL at 08:38

## 2023-03-04 RX ADMIN — ASPIRIN 81 MG: 81 TABLET, COATED ORAL at 08:39

## 2023-03-04 RX ADMIN — ALPRAZOLAM 0.25 MG: 0.25 TABLET ORAL at 10:42

## 2023-03-04 NOTE — DISCHARGE SUMMARY
[facility]  Discharge Summary  Patient: Devon Smith     Date of Admission: 3/1/2023  Date of Discharge:  3/4/23    PCP: Justo Miguel MD    Problem List:    STEMI involving LAD (HCC)    Hyperlipidemia LDL goal <70    Essential hypertension    Coronary artery disease involving native coronary artery of native heart with unstable angina pectoris (HCC)    Acute systolic heart failure (CMS/HCC)      Presenting Problem:  STEMI involving left anterior descending coronary artery (HCC) [I21.02]    Consults:   Consults     No orders found from 1/31/2023 to 3/2/2023.          Procedures Performed:  Procedure(s):  Left Heart Cath  Optical Coherent Tomography  Percutaneous Coronary Intervention       History of Present Illness and Hospital Course  Devon Smith is a 43 y.o. male presented with acute chest pain and anterior STEMI.  He underwent emergent left heart catheterization on 3/1/2023 with KAVON to 100% occluded LAD.  Hospital course has been complicated by acute systolic heart failure with an EF less than 20%.  The patient is being discharged on aspirin and Brilinta for his acute coronary syndrome.  He is being discharged on Coreg and Entresto to hold fleeing in myocardial recovery.  He has a LifeVest wearable cardioverter/defibrillator in place.  We discussed reevaluating his ejection fraction approximately 3 months.  We are requesting a heart/valve clinic follow-up within 1 to 2 weeks.  Consider adding Jardiance at that time.  Would recommend follow-up EKG at that time.  The patient has not had any chest pain but he has had anxiety and panic attacks following his MI.  These have improved with low-dose Xanax.  Discussed with him trial of a low-dose SSRI Lexapro 10 mg daily as well as as needed hydroxyzine to use for anxiety episodes at home.  A cardiac rehab referral will be made.  He will be off of work until he follows up with us in clinic.        Review of Systems:   A full  Please read and follow discharge instructions and return precautions.  Follow a very bland diet.  AcipHex as directed.  Medications as directed until completed.  Do not drink alcohol while taking Flagyl or for 2 days after.  Do not perform any heavy lifting or strenuous activity while taking Cipro and for 2 weeks afterwards.  Very important follow-up closely outpatient with your primary care physician, [facility] or [facility] in the next 1-2 days.  Important to follow up closely outpatient with Dr. Holt or a gastroenterologist of your choice.  Return immediately if you develop new or worsening symptoms or if there are any new problems or concerns.   "14 point review of systems has been performed.  Pertinent positives and negatives are included in the HPI.  Otherwise negative.      Pertinent Test Results:     Cardiac cath summary:  •  Anterior STEMI involving mid % thrombotic occlusion status post OCT guided PCI with a 4.0 x 38 mm Xience KAVON restoring JEREMIE-3 flow.  •  Normal RCA and left circumflex  •  LVEF 25-30% due to anterior, apical, and distal RCA akinesis  •  LVEDP 36 mmHg.    Echo:  •  Left ventricular systolic function is severely decreased. Calculated left ventricular EF = 15.7% Left ventricular ejection fraction appears to be less than 20%.  •  Left ventricular wall thickness is consistent with mild septal asymmetric hypertrophy.  •  The following left ventricular wall segments are hypokinetic: basal inferoseptal and basal inferoseptal. The following left ventricular wall segments are akinetic: mid anterior, apical anterior, mid anterolateral, apical lateral, apical inferior, mid inferior, apical septal, mid inferoseptal, apex and mid anteroseptal. The following left ventricular wall segments are hyperkinetic: basal anterior, basal anterolateral and basal inferior.  •  Left ventricular diastolic function is consistent with (grade II w/high LAP) pseudonormalization.  •  There is a trivial pericardial effusion.  •  No significant structural or functional valvular disease.  •  No evidence of LV thrombus with contrast-enhanced images.      Vital Sign Min/Max for last 24 hours  Temp  Min: 98.1 °F (36.7 °C)  Max: 98.8 °F (37.1 °C)   BP  Min: 78/50  Max: 113/86   Pulse  Min: 85  Max: 108   Resp  Min: 16  Max: 20   SpO2  Min: 94 %  Max: 99 %   No data recorded   No data recorded     Flowsheet Rows    Flowsheet Row First Filed Value   Admission Height 188 cm (74\") Documented at 03/01/2023 1535   Admission Weight 81.6 kg (179 lb 14.3 oz) Documented at 03/01/2023 1535          Telemetry: Sinus rhythm to sinus tachycardia      Intake/Output Summary (Last 24 " hours) at 3/4/2023 1330  Last data filed at 3/4/2023 1200  Gross per 24 hour   Intake 480 ml   Output 1150 ml   Net -670 ml     Intake & Output (last 3 days)       03/01 0701  03/02 0700 03/02 0701  03/03 0700 03/03 0701  03/04 0700 03/04 0701  03/05 0700    P.O. 480 480 840 480    I.V. (mL/kg) 100 (1.2)       Total Intake(mL/kg) 580 (7.1) 480 (5.9) 840 (10.3) 480 (5.9)    Urine (mL/kg/hr) 550 2050 (1.1) 1150 (0.6)     Emesis/NG output 0       Stool 0  0     Total Output 550 2050 1150     Net +30 -1570 -310 +480            Urine Unmeasured Occurrence  1 x 2 x     Stool Unmeasured Occurrence 1 x  1 x 1 x    Emesis Unmeasured Occurrence 3 x              Physical Exam:  Constitutional:       Appearance: Healthy appearance. Not in distress.   Pulmonary:      Effort: Pulmonary effort is normal.      Breath sounds: No rales.   Cardiovascular:      Regular rhythm.      Murmurs: There is no murmur.   Pulses:     Intact distal pulses.   Edema:     Peripheral edema absent.   Skin:     General: Skin is warm and dry.         Discharge Disposition  Home or Self Care    Discharge Medications     Discharge Medications      New Medications      Instructions Start Date   aspirin 81 MG EC tablet   81 mg, Oral, Daily      carvedilol 6.25 MG tablet  Commonly known as: COREG   6.25 mg, Oral, Every 12 Hours Scheduled      escitalopram 10 MG tablet  Commonly known as: Lexapro   10 mg, Oral, Daily      hydrOXYzine 25 MG tablet  Commonly known as: ATARAX   25 mg, Oral, Every 8 Hours PRN      nitroglycerin 0.4 MG SL tablet  Commonly known as: NITROSTAT   0.4 mg, Sublingual, Every 5 Minutes PRN, Take no more than 3 doses in 15 minutes.      rosuvastatin 40 MG tablet  Commonly known as: CRESTOR   40 mg, Oral, Nightly      sacubitril-valsartan 24-26 MG tablet  Commonly known as: ENTRESTO   1 tablet, Oral, Every 12 Hours Scheduled      ticagrelor 90 MG tablet tablet  Commonly known as: BRILINTA   90 mg, Oral, 2 Times Daily         Stop These  Medications    Advil 200 MG capsule  Generic drug: Ibuprofen            Discharge Diet   Heart healthy diet    Activity at Discharge  Patient will be off of work for 2 weeks until he follows up in clinic    Follow-up Appointments  Future Appointments   Date Time Provider Department Center   3/28/2023  9:00 AM ORIENTATION [facility]  [facility]     Additional Instructions for the Follow-ups that You Need to Schedule     Ambulatory Referral to Cardiac Rehab   As directed            Test Results Pending at Discharge  None    Time: > 30 minutes    Jose Alberto Velasco MD  03/04/23  13:30 EST

## 2023-03-04 NOTE — PROGRESS NOTES
INTENSIVIST   PROGRESS NOTE        SUBJECTIVE     Devon 43 y.o. male is followed for: No chief complaint on file.       STEMI involving LAD (HCC)    Hyperlipidemia LDL goal <70    Essential hypertension    Coronary artery disease involving native coronary artery of native heart with unstable angina pectoris (HCC)    Acute systolic heart failure (CMS/HCC)    As an Intensivist, we provide an integrated approach to the ICU patient and family, medical management of comorbid conditions, including but not limited to electrolytes, glycemic control, organ dysfunction, lead interdisciplinary rounds and coordinate the care with all other services, including those from other specialists.     Interval History:  POD: 3 Days Post-Op ([facility])    Doing well.  Episodes of what he describes as panic attacks. He never had them before this admission.    Temp  Min: 98.1 °F (36.7 °C)  Max: 98.8 °F (37.1 °C)       History     Last Reviewed by Kam Mancilla MD on 3/1/2023 at  5:12 PM    Sections Reviewed    Medical, Family, Social Documentation      Problem list reviewed by Kam Mancilla MD on 3/1/2023 at  5:12 PM  Medicines reviewed by Kam Mancilla MD on 3/1/2023 at  5:12 PM  Allergies reviewed by Kam Mancilla MD on 3/1/2023 at  5:12 PM       The patient's relevant past medical, surgical and social history were reviewed and updated in Epic as appropriate.        OBJECTIVE     Vitals:  Temp: 98.1 °F (36.7 °C) (23 0800) Temp  Min: 98.1 °F (36.7 °C)  Max: 98.8 °F (37.1 °C)   Temp core:      BP: 101/72 (23 1100) BP  Min: 78/50  Max: 113/86   MAP (non-invasive) Noninvasive MAP (mmHg): 84 (23 1100) Noninvasive MAP (mmHg)  Av.3  Min: 59  Max: 130   Pulse: 108 (23 1100) Pulse  Min: 85  Max: 108   Resp: 20 (23 0800) Resp  Min: 16  Max: 20   SpO2: 99 % (23 1100) SpO2  Min: 94 %  Max: 99 %   Device: room air (23 1000)    Flow Rate: 2 (23 1534) No data recorded         23  1532  03/02/23  0905   Weight: 81.6 kg (179 lb 14.3 oz) 81.2 kg (179 lb)        Intake/Ouptut 24 hrs (7:00AM - 6:59 AM)  Intake & Output (last 3 days)       03/01 0701 03/02 0700 03/02 0701 03/03 0700 03/03 0701 03/04 0700 03/04 0701 03/05 0700    P.O. 480 480 840 240    I.V. (mL/kg) 100 (1.2)       Total Intake(mL/kg) 580 (7.1) 480 (5.9) 840 (10.3) 240 (3)    Urine (mL/kg/hr) 550 2050 (1.1) 1150 (0.6)     Emesis/NG output 0       Stool 0  0     Total Output 550 2050 1150     Net +30 -1570 -310 +240            Urine Unmeasured Occurrence  1 x 2 x     Stool Unmeasured Occurrence 1 x  1 x     Emesis Unmeasured Occurrence 3 x                Physical Examination  Telemetry:  Rhythm: normal sinus rhythm (03/04/23 1000)         Constitutional:  No acute distress.   Cardiovascular: RRR.    Respiratory: Normal breath sounds  No adventitious sounds.   Abdominal:  Soft with no tenderness.   Extremities: No Edema   Neurological:   Alert, Oriented, Cooperative.  Best Eye Response: 4-->(E4) spontaneous (03/04/23 1000)  Best Motor Response: 6-->(M6) obeys commands (03/04/23 1000)  Best Verbal Response: 5-->(V5) oriented (03/04/23 1000)  Adelina Coma Scale Score: 15 (03/04/23 1000)       Results Reviewed:  Laboratory  Microbiology  Radiology  Pathology    Hematology:  Results from last 7 days   Lab Units 03/03/23  0334 03/01/23  2255   WBC 10*3/mm3 12.85* 12.05*   HEMOGLOBIN g/dL 13.2 13.4   MCV fL 90.7 89.6   PLATELETS 10*3/mm3 208 212   NEUTROS ABS 10*3/mm3 9.09* 9.58*   EOS ABS 10*3/mm3 0.09 0.02       Chemistry:  Estimated Creatinine Clearance: 117.7 mL/min (by C-G formula based on SCr of 0.91 mg/dL).    Results from last 7 days   Lab Units 03/03/23  0334 03/02/23  0349   SODIUM mmol/L 137 137   POTASSIUM mmol/L 4.3 4.5   CHLORIDE mmol/L 103 99   CO2 mmol/L 25.0 23.0   BUN mg/dL 17 12   CREATININE mg/dL 0.91 0.87   GLUCOSE mg/dL 132* 154*     Results from last 7 days   Lab Units 03/03/23  0334 03/02/23  0349   CALCIUM mg/dL 8.7  9.1   MAGNESIUM mg/dL 2.2 2.6     Cardiac Labs:  Results from last 7 days   Lab Units 03/03/23  0334 03/02/23  0349 03/01/23  1738 03/01/23  1554   PROBNP pg/mL  --   --   --  <5.0   HSTROP T ng/L 5,599* >10,000* 6,565* 52*       Images:  XR Chest 1 View    Result Date: 3/3/2023  Impression: No acute cardiopulmonary process. Electronically Signed: Gilbert Real  3/3/2023 7:33 AM EST  Workstation ID: [id number]    Results for orders placed during the hospital encounter of 03/01/23    Adult Transthoracic Echo Complete W/ Cont if Necessary Per Protocol    Interpretation Summary  •  Left ventricular systolic function is severely decreased. Calculated left ventricular EF = 15.7% Left ventricular ejection fraction appears to be less than 20%.  •  Left ventricular wall thickness is consistent with mild septal asymmetric hypertrophy.  •  The following left ventricular wall segments are hypokinetic: basal inferoseptal and basal inferoseptal. The following left ventricular wall segments are akinetic: mid anterior, apical anterior, mid anterolateral, apical lateral, apical inferior, mid inferior, apical septal, mid inferoseptal, apex and mid anteroseptal. The following left ventricular wall segments are hyperkinetic: basal anterior, basal anterolateral and basal inferior.  •  Left ventricular diastolic function is consistent with (grade II w/high LAP) pseudonormalization.  •  There is a trivial pericardial effusion.  •  No significant structural or functional valvular disease.  •  No evidence of LV thrombus with contrast-enhanced images.    Results: Reviewed.  I reviewed the patient's new laboratory and imaging results.  I independently reviewed the patient's new images.    Medications: Reviewed.    Assessment   A/P     Hospital:  LOS: 3 days   ICU: 2d 19h     Active Hospital Problems    Diagnosis  POA   • **STEMI involving left anterior descending coronary artery (HCC) [I21.02]  Yes   • Hyperlipidemia LDL goal <70 [E78.5]   Unknown   • Essential hypertension [I10]  Unknown   • Coronary artery disease involving native coronary artery of native heart with unstable angina pectoris (HCC) [I25.110]  Unknown     3/1/2023: Anterior STEMI, mid LAD 10 percent thrombotic occlusion status post OCT guided PCI with a 4.0 x 38 mm Xience KAVON, proximal LAD 30-40%, normal circumflex and RCA, LVEF 25-30% due to anterior apical and distal inferior akinesis, EDP 36 mmHg       Devno is a 43 y.o. male transferred from [facility] on 3/1/2023 withchest pain and anterior STEMI.    Substernal chest pain and pressure, described as a squeezing, crushing sensation in his substernal chest region rating to bilateral arms and bilateral jaw.  EMS performed field ECG which showed significant 4 mm anterior lateral ST elevation with reciprocal depression.         Assessment/Management/Treatment Plan:    1. Anterior STEMI  a. [facility] 3/1/2023: mid LAD 10% thrombotic occlusion status post OCT guided PCI with a 4.0 x 38 mm Xience KAVON, proximal LAD 30-40%, normal circumflex and RCA, LVEF 25-30% due to anterior apical and distal inferior akinesis, EDP 36 mmHg  2. Cardiovascular  a. HTN ? Treatment with CCB  b. Dyslipidemia ? Treatment with statin.  c. CAD  3. Non-smoker  4. Endocrine  a. Body mass index is 22.98 kg/m². Normal: 18.5-24.9kg/m2  b. No history of T2 Diabetes    Lab Results   Lab Value Date/Time    HGBA1C 5.70 (H) 03/02/2023 0349           Diet: Diet: Cardiac Diets; Healthy Heart (2-3 Na+); Texture: Regular Texture (IDDSI 7); Fluid Consistency: Thin (IDDSI 0)   Advance Directives: Code Status and Medical Interventions:   Ordered at: 03/01/23 1635     Level Of Support Discussed With:    Patient     Code Status (Patient has no pulse and is not breathing):    CPR (Attempt to Resuscitate)     Medical Interventions (Patient has pulse or is breathing):    Full Support     Release to patient:    Routine Release        DVT prophylaxis:  No DVT prophylaxis order  currently exists.     In brief:  1. ECHO:  EF 15.7%  2. Awaiting for Life Vest.  3. Disposition: Discharge Home if Life Vest is available} Per Cardiology, otherwise consider transfer to Telemetry.    Plan of care and goals reviewed during interdisciplinary rounds.  I discussed the patient's findings and my recommendations with patient and nursing staff    MDM:    Problem(s) High due to: Acute illness(es) that may pose a threat to life or bodily function  Data: Moderate due to: Review or results of each unique test  Risk: Moderate due to: prescription drug management    Moderate      [] Primary Attending Intensive Care Medicine - Nutrition Support   [x] Consultant

## 2023-03-04 NOTE — OUTREACH NOTE
Prep Survey    Flowsheet Row Responses   [facility] patient discharged from? [facility]   Is LACE score < 7 ? No   Eligibility Readm Mgmt   Discharge diagnosis CARDIAC CATHETERIZATION  STEMI    Does the patient have one of the following disease processes/diagnoses(primary or secondary)? Acute MI (STEMI,NSTEMI)   Does the patient have Home health ordered? No   Is there a DME ordered? No   Prep survey completed? Yes          ZIYAD NIEVES - Registered Nurse

## 2023-03-04 NOTE — PLAN OF CARE
Problem: Adult Inpatient Plan of Care  Goal: Readiness for Transition of Care  Outcome: Ongoing, Progressing   Goal Outcome Evaluation:      VSS overnight, tolerated all oral bp meds. Rested well overnight, no complaints of chest pain, SOB, or pain. One episode of anxiety, prn Xanax given and patient reported feeling better.

## 2023-03-04 NOTE — DISCHARGE INSTR - ACTIVITY
Patient to remain off of work until follow- up appointment on 03/28/2023 per Jose Alberto Velasco MD.

## 2023-03-05 NOTE — PROGRESS NOTES
Enter Query Response Below      Query Response:  yes evidence of acute systolic heart failure based on newly decreased ejection fraction of 15% and elevated LVEDP on cardiac catheterization    Electronically signed by Jose Alberto Velasco MD, 23, 7:52 AM EST.               If applicable, please update the problem list.     Patient: Devon Smith        : 1980  Account: [id number]           Admit Date:         How to Respond to this query:       a. Click New Note     b. Answer query within the yellow box.                c. Update the Problem List, if applicable.      If you have any questions about this query contact me at: [email or web address]    Dr. Velasco:    43 year old male admitted for STEMI.  History of hypertension.  Clinical indicators: Cardiovascular: no jugular venous distention, regular rhythm, apical impulse normal, S1 normal, S2 normal, no S3, no S4, no murmur, no rub, no thrill, lower extremity edema: none.   proBNP <5.0 (3/1).  Cardiac cath 3/1- EF = 15.7.   Cxr (3/2) Impression: No acute cardiopulmonary process. Cxr ( 3/1) Impression -No evidence for acute cardiopulmonary process.   Discharge summary includes Acute systolic heart failure.  Treatments:  coreg, losartan , entresto.    After study, was Acute systolic heart failure clinically supported during this admission?    Yes, please include additional clinical indicators:____________  No  Other- specify________  Unable to determine       By submitting this query, we are merely seeking further clarification of documentation to accurately reflect all conditions that you are monitoring, evaluating, treating or that extend the hospitalization or utilize additional resources of care. Please utilize your independent clinical judgment when addressing the question(s) above.     This query and your response, once completed, will be entered into the legal medical record.    Sincerely,  Luzma Wheeler RN  Clinical Documentation Integrity  Program

## 2023-03-06 ENCOUNTER — DOCUMENTATION (OUTPATIENT)
Dept: CARDIAC REHAB | Facility: HOSPITAL | Age: 43
End: 2023-03-06
Payer: COMMERCIAL

## 2023-03-06 NOTE — PROGRESS NOTES
Pt.'s spouse called CR staff in regards to moving up patients appointment. Staff reschedule Pt.'s Phase II Cardiac Rehab Orientation to Thursday, March 16th at 1300.

## 2023-03-07 LAB
QT INTERVAL: 318 MS
QT INTERVAL: 328 MS
QTC INTERVAL: 423 MS
QTC INTERVAL: 438 MS

## 2023-03-08 ENCOUNTER — READMISSION MANAGEMENT (OUTPATIENT)
Dept: CALL CENTER | Facility: HOSPITAL | Age: 43
End: 2023-03-08
Payer: COMMERCIAL

## 2023-03-08 NOTE — OUTREACH NOTE
"AMI Week 1 Survey    Flowsheet Row Responses   [facility] [place] patient discharged from? [facility]   Does the patient have one of the following disease processes/diagnoses(primary or secondary)? Acute MI (STEMI,NSTEMI)   Week 1 attempt successful? Yes   Call start time 1059   Call end time 1118   Discharge diagnosis CARDIAC CATHETERIZATION  STEMI    Person spoke with today (if not patient) and relationship pt and wife   Medication alerts for this patient has not been taking Lexepro or Hydroxyzine for anxiety.   Meds reviewed with patient/caregiver? Yes   Is the patient having any side effects they believe may be caused by any medication additions or changes? Yes   Side effects comments  hypotension 70-80/60 today, pt has held the Coreg   Does the patient have all prescriptions related to this admission filled (includes statins,anticoagulants,HTN meds,anti-arrhythmia meds) Yes   Is the patient taking all medications as directed (includes completed medication regime)? Yes   Does the patient have a primary care provider?  Yes   Does the patient have an appointment with their PCP,cardiologist,or clinic within 7 days of discharge? Yes   Has the patient kept scheduled appointments due by today? N/A   Psychosocial issues? Yes   Psychosocial comments Pt is having anxiety attacks r/t MI.    Comments today his BP /53-71, 80,  having some \"foggy headed\"when stands up vision is affected.He is waiting for call back from Cardio.We discussed safety issues upon standing. Suggested he also take his BP standing. [facility] site- right radial  bruised on forearm, soft no temp change.   Did the patient receive a copy of their discharge instructions? Yes   Nursing interventions Reviewed instructions with patient   What is the patient's perception of their health status since discharge? Improving   Nursing interventions Nurse provided patient education   Is the patient/caregiver able to teach back signs and symptoms of when to call for " help immediately: Sudden chest discomfort, Sudden discomfort in arms, back, neck or jaw, Dizziness or lightheadedness   Nursing interventions Nurse provided patient education, Advised patient to call provider   Is the pateint /caregiver able to teach back the importance of cardiac rehab? Yes   Is the patient/caregiver able to teach back lifestyle changes to help prevent MIs Regular exercise as approved by provider, Heart healthy diet   Is the patient/caregiver able to teach back ways to prevent a second heart attack: Take medications   If the patient is a current smoker, are they able to teach back resources for cessation? Not a smoker   Is the patient/caregiver able to teach back the hierarchy of who to call/visit for symptoms/problems? PCP, Specialist, Home health nurse, Urgent Care, ED, 911 Yes   Week 1 call completed? Yes   Is the patient interested in additional calls from an ambulatory ?  NOTE:  applies to high risk patients requiring additional follow-up. No          Lynda DOMINGO - Registered Nurse

## 2023-03-09 ENCOUNTER — TELEPHONE (OUTPATIENT)
Dept: CARDIOLOGY | Facility: CLINIC | Age: 43
End: 2023-03-09
Payer: COMMERCIAL

## 2023-03-09 NOTE — TELEPHONE ENCOUNTER
Pt sent me his BP readings for the week via [employer], but not sure you can see them. He is inquiring if he should carvedilol if SBP is less than 100. His average BP is 80-90/60's HR 80's. He only had 4 SBP greater than 100, out of 14 readings. EF 3/2/23 less than 20%. Also taking Entresto 24-26 mg. Please advise

## 2023-03-09 NOTE — TELEPHONE ENCOUNTER
----- Message from Devon Smith sent at 3/9/2023 10:38 AM EST -----  Regarding: Carvedilol question  Contact: [phone]  Maxwell Bales,       I have attached some info from my apple watch about my BP and heart rate. Just to be clear, the last time I took Carvedilol was probably on Tuesday morning. Please let me know if You want me to resume it. I can send more info on my BP/HR if you need it.

## 2023-03-11 ENCOUNTER — NURSE TRIAGE (OUTPATIENT)
Dept: CALL CENTER | Facility: HOSPITAL | Age: 43
End: 2023-03-11
Payer: COMMERCIAL

## 2023-03-12 NOTE — TELEPHONE ENCOUNTER
"  Reason for Disposition  • Heart rhythm alert (e.g., \"you have irregular heartbeat\") from personal wearable device (e.g., Apple Watch)    Additional Information  • Negative: Passed out (i.e., lost consciousness, collapsed and was not responding)  • Negative: Shock suspected (e.g., cold/pale/clammy skin, too weak to stand, low BP, rapid pulse)  • Negative: Difficult to awaken or acting confused (e.g., disoriented, slurred speech)  • Negative: Visible sweat on face or sweat dripping down face  • Negative: Unable to walk, or can only walk with assistance (e.g., requires support)  • Negative: [1] Received SHOCK from implantable cardiac defibrillator AND [2] persisting symptoms (i.e., palpitations, lightheadedness)  • Negative: [1] Dizziness, lightheadedness, or weakness AND [2] heart beating very rapidly (e.g., > 140 / minute)  • Negative: [1] Dizziness, lightheadedness, or weakness AND [2] heart beating very slowly  (e.g., < 50 / minute)  • Negative: Sounds like a life-threatening emergency to the triager  • Negative: Chest pain  • Negative: Implantable Cardiac Defibrillator (ICD) or a pacemaker symptoms or questions  • Negative: Difficulty breathing  • Negative: Dizziness, lightheadedness, or weakness  • Negative: [1] Heart beating very rapidly (e.g., > 140 / minute) AND [2] present now  (Exception: during exercise)  • Negative: Heart beating very slowly (e.g., < 50 / minute)  (Exception: athlete and heart rate normal for caller)  • Negative: New or worsened shortness of breath with activity (dyspnea on exertion)  • Negative: Patient sounds very sick or weak to the triager  • Negative: [1] Heart beating very rapidly (e.g., > 140 / minute) AND [2] not present now  (Exception: during exercise)  • Negative: [1] Skipped or extra beat(s) AND [2] increases with exercise or exertion  • Negative: [1] Skipped or extra beat(s) AND [2] occurs 4 or more times per minute  • Negative: New or worsened ankle swelling  • Negative: " "History of heart disease  (i.e., heart attack, bypass surgery, angina, angioplasty, CHF) (Exception: brief heartbeat symptoms that went away and now feels well)  • Negative: Age > 60 years (Exception: brief heartbeat symptoms that went away and now feels well)  • Negative: Taking water pill (i.e., diuretic) or heart medication (e.g., digoxin)  • Negative: Wearing a \"Holter monitor\" or \"cardiac event monitor\"  • Negative: [1] Received SHOCK from implantable cardiac defibrillator AND [2] now feels well  • Negative: History of hyperthyroidism or taking thyroid medication  • Negative: Substance use (drug use) or misuse, known or suspected  • Negative: [1] ADHD AND [2] taking stimulant medication  • Negative: [1] Palpitations AND [2] no improvement after using CARE ADVICE  • Negative: Problems with anxiety or stress  • Negative: Palpitations are a chronic symptom (recurrent or ongoing AND present > 4 weeks)  • Negative: [1] Skipped or extra beat(s) AND [2] occurs < 4 times / minute  • Negative: Palpitations    Answer Assessment - Initial Assessment Questions  1. DESCRIPTION: \"Please describe your heart rate or heartbeat that you are having\" (e.g., fast/slow, regular/irregular, skipped or extra beats, \"palpitations\")      Slow  2. ONSET: \"When did it start?\" (Minutes, hours or days)       Today  3. DURATION: \"How long does it last\" (e.g., seconds, minutes, hours)      hours  4. PATTERN \"Does it come and go, or has it been constant since it started?\"  \"Does it get worse with exertion?\"   \"Are you feeling it now?\"      Constant today  5. TAP: \"Using your hand, can you tap out what you are feeling on a chair or table in front of you, so that I can hear?\" (Note: not all patients can do this)           6. HEART RATE: \"Can you tell me your heart rate?\" \"How many beats in 15 seconds?\"  (Note: not all patients can do this)        60-66 bpm  7. RECURRENT SYMPTOM: \"Have you ever had this before?\" If Yes, ask: \"When was the last " "time?\" and \"What happened that time?\"       No  8. CAUSE: \"What do you think is causing the palpitations?\"      New medications  9. CARDIAC HISTORY: \"Do you have any history of heart disease?\" (e.g., heart attack, angina, bypass surgery, angioplasty, arrhythmia)       STEMI s/p 1 week  10. OTHER SYMPTOMS: \"Do you have any other symptoms?\" (e.g., dizziness, chest pain, sweating, difficulty breathing)        none  11. PREGNANCY: \"Is there any chance you are pregnant?\" \"When was your last menstrual period?\"        na    Protocols used: HEART RATE AND HEARTBEAT QUESTIONS-ADULT-AH    "

## 2023-03-14 NOTE — PROGRESS NOTES
"[facility]  [facility]    Chief Complaint  Establish Care, Non-Stemi, and Hospital Follow Up Visit    Subjective    History of Present Illness {CC  Problem List  Visit  Diagnosis   Encounters  Notes  Medications  Labs  Result Review Imaging  Media :23}     Devon Smith is a 43 y.o. male with hyperlipidemia who presents today as a hospital referral for STEMI.    Patient admitted 3/1 with anterior STEMI.  Left heart catheterization showed occluded LAD status post drug-eluting stent.EF was less than 20%.  He was discharged with carvedilol and Entresto as well as a LifeVest. He had episodes of hypotension, reports that SBPs have been in the 70s-90s. Feels \"spacy\". Reports that he has lost his vision when he stands up. Feels great before his medication, like he could run a mile but in the evening feels very lightheaded after his meds. He notes occasional dyspnea of he takes a hot shower but not with exertion. No orthopnea/PND.             Objective     Vital Signs:   Vitals:    03/15/23 1058 03/15/23 1100 03/15/23 1101   BP: 103/70 100/68 115/70   BP Location: Right arm Left arm Left arm   Patient Position: Sitting Sitting Standing   Cuff Size: Adult Adult Adult   Pulse: 78 72 82   Resp: 16     Temp: 97.3 °F (36.3 °C)     TempSrc: Temporal     SpO2: 100%     Weight: 81.2 kg (179 lb)       Body mass index is 22.98 kg/m².  Physical Exam  Vitals reviewed.   Constitutional:       Appearance: Normal appearance.   HENT:      Head: Normocephalic.   Neck:      Vascular: No carotid bruit.   Cardiovascular:      Rate and Rhythm: Normal rate and regular rhythm.      Pulses: Normal pulses.      Heart sounds: Normal heart sounds, S1 normal and S2 normal. No murmur heard.  Pulmonary:      Effort: Pulmonary effort is normal. No respiratory distress.      Breath sounds: Normal breath sounds.   Chest:      Chest wall: No tenderness.   Abdominal:      General: Abdomen is flat.      " Palpations: Abdomen is soft.   Musculoskeletal:      Cervical back: Neck supple.      Right lower leg: No edema.      Left lower leg: No edema.   Skin:     General: Skin is warm and dry.   Neurological:      General: No focal deficit present.      Mental Status: He is alert and oriented to person, place, and time. Mental status is at baseline.   Psychiatric:         Mood and Affect: Mood normal.         Behavior: Behavior normal.         Thought Content: Thought content normal.              Result Review  Data Reviewed:{ Labs  Result Review  Imaging  Med Tab  Media :23}   ECG 12 Lead (03/02/2023 05:54)  Adult Transthoracic Echo Complete W/ Cont if Necessary Per Protocol (03/02/2023 09:06)  Cardiac Catheterization/Vascular Study (03/01/2023 16:24)  ECG 12 Lead Chest Pain (03/01/2023 22:22)  High Sensitivity Troponin T (03/03/2023 03:34)  Magnesium (03/03/2023 03:34)  Basic Metabolic Panel (03/03/2023 03:34)  CBC & Differential (03/03/2023 03:34)               Assessment and Plan {CC Problem List  Visit Diagnosis  ROS  Review (Popup)  [facility] Maintenance  Quality  BestPractice  Medications  SmartSets  SnapShot Encounters  Media :23}   1. Acute systolic heart failure (CMS/HCC)  -LVEF <20%  -Appears euvolemic  -Unable to tolerate Entresto secondary to hypotension.   -We will change carvedilol to metoprolol succinate 25 mg daily.  Will not add Jardiance at this time due to episodes of hypotension and orthostatic lightheadedness.  We will reconsider at follow-up in 2 weeks.  Will also consider retrialing Entresto at that time  - Heart failure education provided today including signs and symptoms, causes of heart failure, medications, daily weights, low sodium diet (less than 1500 mg per day),and daily physical activity as tolerated. Reviewed HF Zones with patient and family.  - Has appt with cardiac rehab tomorrow          2. Coronary artery disease involving coronary bypass graft of native heart without  angina pectoris  - s/p LAD stent 3/1  - Continue DAPT, statin          Follow Up {Instructions Charge Capture  Follow-up Communications :23}   Return in about 2 weeks (around 3/29/2023) for Video Visit, HF.    Patient was given instructions and counseling regarding his condition or for health maintenance advice. Please see specific information pulled into the AVS if appropriate.  Advised to call the Heart and Valve Center with any questions, concerns, or worsening symptoms.

## 2023-03-15 ENCOUNTER — OFFICE VISIT (OUTPATIENT)
Dept: CARDIOLOGY | Facility: HOSPITAL | Age: 43
End: 2023-03-15
Payer: COMMERCIAL

## 2023-03-15 VITALS
SYSTOLIC BLOOD PRESSURE: 115 MMHG | RESPIRATION RATE: 16 BRPM | WEIGHT: 179 LBS | HEART RATE: 82 BPM | OXYGEN SATURATION: 100 % | DIASTOLIC BLOOD PRESSURE: 70 MMHG | TEMPERATURE: 97.3 F | BODY MASS INDEX: 22.98 KG/M2

## 2023-03-15 DIAGNOSIS — I25.810 CORONARY ARTERY DISEASE INVOLVING CORONARY BYPASS GRAFT OF NATIVE HEART WITHOUT ANGINA PECTORIS: ICD-10-CM

## 2023-03-15 DIAGNOSIS — I50.21 ACUTE SYSTOLIC HEART FAILURE: Primary | ICD-10-CM

## 2023-03-15 PROCEDURE — 99214 OFFICE O/P EST MOD 30 MIN: CPT | Performed by: NURSE PRACTITIONER

## 2023-03-15 RX ORDER — METOPROLOL SUCCINATE 25 MG/1
25 TABLET, EXTENDED RELEASE ORAL DAILY
Qty: 30 TABLET | Refills: 3 | Status: SHIPPED | OUTPATIENT
Start: 2023-03-15

## 2023-03-16 ENCOUNTER — TREATMENT (OUTPATIENT)
Dept: CARDIAC REHAB | Facility: HOSPITAL | Age: 43
End: 2023-03-16
Payer: COMMERCIAL

## 2023-03-16 DIAGNOSIS — I21.02 STEMI INVOLVING LEFT ANTERIOR DESCENDING CORONARY ARTERY: ICD-10-CM

## 2023-03-16 PROCEDURE — 93798 PHYS/QHP OP CAR RHAB W/ECG: CPT

## 2023-03-16 NOTE — PROGRESS NOTES
Attended Phase II Cardiac Rehab orientation. No medication or health history changes reported. See [facility] for details.

## 2023-03-20 ENCOUNTER — TREATMENT (OUTPATIENT)
Dept: CARDIAC REHAB | Facility: HOSPITAL | Age: 43
End: 2023-03-20
Payer: COMMERCIAL

## 2023-03-20 DIAGNOSIS — I21.02 STEMI INVOLVING LEFT ANTERIOR DESCENDING CORONARY ARTERY: Primary | ICD-10-CM

## 2023-03-20 PROCEDURE — 93798 PHYS/QHP OP CAR RHAB W/ECG: CPT

## 2023-03-21 ENCOUNTER — OFFICE VISIT (OUTPATIENT)
Dept: INTERNAL MEDICINE | Facility: CLINIC | Age: 43
End: 2023-03-21
Payer: COMMERCIAL

## 2023-03-21 VITALS
HEIGHT: 74 IN | SYSTOLIC BLOOD PRESSURE: 100 MMHG | RESPIRATION RATE: 20 BRPM | HEART RATE: 95 BPM | OXYGEN SATURATION: 98 % | WEIGHT: 180.5 LBS | TEMPERATURE: 98.2 F | BODY MASS INDEX: 23.16 KG/M2 | DIASTOLIC BLOOD PRESSURE: 80 MMHG

## 2023-03-21 DIAGNOSIS — R73.03 PREDIABETES: ICD-10-CM

## 2023-03-21 DIAGNOSIS — E78.5 HYPERLIPIDEMIA LDL GOAL <70: ICD-10-CM

## 2023-03-21 DIAGNOSIS — Z12.11 COLON CANCER SCREENING: ICD-10-CM

## 2023-03-21 DIAGNOSIS — I25.110 CORONARY ARTERY DISEASE INVOLVING NATIVE CORONARY ARTERY OF NATIVE HEART WITH UNSTABLE ANGINA PECTORIS: Primary | ICD-10-CM

## 2023-03-21 DIAGNOSIS — I50.21 ACUTE SYSTOLIC HEART FAILURE: ICD-10-CM

## 2023-03-21 DIAGNOSIS — M50.90 CERVICAL DISC DISEASE: ICD-10-CM

## 2023-03-21 PROCEDURE — 99204 OFFICE O/P NEW MOD 45 MIN: CPT | Performed by: STUDENT IN AN ORGANIZED HEALTH CARE EDUCATION/TRAINING PROGRAM

## 2023-03-21 RX ORDER — VALACYCLOVIR HYDROCHLORIDE 1 G/1
1000 TABLET, FILM COATED ORAL 2 TIMES DAILY PRN
COMMUNITY

## 2023-03-21 NOTE — PROGRESS NOTES
"    Follow Up Office Visit      Date: 2023   Patient Name: Devon Smith  : 1980   MRN: [id number]     Chief Complaint:    Chief Complaint   Patient presents with   • Establish Care     Hear attack 23       History of Present Illness: Devon Smith is a 43 y.o. male who is here today to establish care.    HPI     Per recent DC summary dated 3/4/23 (completed 3/19/23) by DR. Jose Alberto Velasco:  \"Devon Smith is a 43 y.o. male presented with acute chest pain and anterior STEMI.  He underwent emergent left heart catheterization on 3/1/2023 with KAVON to 100% occluded LAD.  Hospital course has been complicated by acute systolic heart failure with an EF less than 20%.  The patient is being discharged on aspirin and Brilinta for his acute coronary syndrome.  He is being discharged on Coreg and Entresto to hold fleeing in myocardial recovery.  He has a LifeVest wearable cardioverter/defibrillator in place.  We discussed reevaluating his ejection fraction approximately 3 months.  We are requesting a heart/valve clinic follow-up within 1 to 2 weeks.  Consider adding Jardiance at that time.  Would recommend follow-up EKG at that time.  The patient has not had any chest pain but he has had anxiety and panic attacks following his MI.  These have improved with low-dose Xanax.  Discussed with him trial of a low-dose SSRI Lexapro 10 mg daily as well as as needed hydroxyzine to use for anxiety episodes at home.  A cardiac rehab referral will be made.  He will be off of work until he follows up with us in clinic.\"    Currently doing cardiac rehab.    Followed up with Cardiology, Melissa SOLARES on 3/15/23. I personally reviewed this note. At that time changed Coreg to metoprolol succinate 25mg due to hypotension. Quit entresto due to symptoms of hypotension and held off of starting jardiance. Has follow up 3/29/23      The patient has consented to the use of [place].     The patient presents to the " "clinic to establish care.     Health maintenance.   His previous primary care provider was Dr. Miguel. He is up to date with his influenza vaccine. Patient is due for a colonoscopy.     Hospital follow-up.   The patient was admitted at the hospital last 03/01/2023 due to acute chest pain and STEMI for which he underwent emergent left heart catheterization. He was discharged with aspirin, ticagrelor, Coreg, and Entresto. He also has a LifeVest wearable cardioverter/defibrillator in place. He started cardiac rehab on 03/20/2023 and is scheduled 3 times a week. Patient has an appointment with his cardiologist BECK Kern on 03/28/2023. His current medications include Lexapro 10 mg once a day, Atarax 25 mg as needed, metoprolol 25 mg once in the morning, ticagrelor 90 mg 2 times a day, and Crestor 40 mg once a day. Entresto is currently on hold. Patient has been doing well but reports that he would have good and bad days. There would be days where he feels \"\" from everything. He also experiences dizziness and tunneling of vision. Patient reports hearing loss which started yesterday. He denies any chest pain, dyspnea, palpitations, or lower extremity swelling since discharge. He monitors his blood pressure at home with a systolic blood pressure averaging from 79 to 115 mmHg.     Anxiety.   Patient states that he has occasional bouts of anxiety which improves with deep breathing and taking things slowly. He is tolerating the Lexapro 10 mg once a day. He has tried taking Zoloft for 6 weeks when he was 16 years old. He denies any new episodes of panic attacks since the hospital admission.     Past medical history.   The patient had a history of MRSA cellulitis in 2013 and COVID-19 infection. He also has a bulging disc in the cervical region. He was admitted at the hospital last 03/01/2023 due to acute chest pain and STEMI for which he underwent emergent left heart catheterization.     Surgical history. "   Patient underwent incision and drainage in 2013 due to MRSA infection. He also had a left heart catheterization last 03/01/2023.     Family history.   He has a history of heart failure and diabetes in his maternal grandmother and CAD in his paternal grandfather. His father has a history of myocardial infarction and underwent CABG procedure at the age of 67. His mother has a history of migraine headaches. He denies any family history of colon and prostate cancer.     Social history.   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 He never smoked cigarettes and does not drink alcoholic beverages. Patient states that he has been eating more carbohydrates       PHQ-9 Depression Screening  Little interest or pleasure in doing things? 2-->more than half the days   Feeling down, depressed, or hopeless? 0-->not at all   Trouble falling or staying asleep, or sleeping too much? 0-->not at all   Feeling tired or having little energy? 2-->more than half the days   Poor appetite or overeating? 0-->not at all   Feeling bad about yourself - or that you are a failure or have let yourself or your family down? 0-->not at all   Trouble concentrating on things, such as reading the newspaper or watching television? 1-->several days   Moving or speaking so slowly that other people could have noticed? Or the opposite - being so fidgety or restless that you have been moving around a lot more than usual? 0-->not at all   Thoughts that you would be better off dead, or of hurting yourself in some way? 0-->not at all   PHQ-9 Total Score 5   If you checked off any problems, how difficult have these problems made it for you to do your work, take care of things at home, or get along with other people? not difficult at all           Subjective      Review of Systems:   Review of Systems   Document verbalized in HPI.     I have reviewed the patients family history, social history, past medical history, past surgical history and  "have updated it as appropriate.     Medications:     Current Outpatient Medications:   •  aspirin 81 MG EC tablet, Take 1 tablet by mouth Daily., Disp: 90 tablet, Rfl: 3  •  escitalopram (Lexapro) 10 MG tablet, Take 1 tablet by mouth Daily., Disp: 30 tablet, Rfl: 1  •  hydrOXYzine (ATARAX) 25 MG tablet, Take 1 tablet by mouth Every 8 (Eight) Hours As Needed for Itching or Anxiety., Disp: 30 tablet, Rfl: 3  •  metoprolol succinate XL (TOPROL-XL) 25 MG 24 hr tablet, Take 1 tablet by mouth Daily., Disp: 30 tablet, Rfl: 3  •  nitroglycerin (NITROSTAT) 0.4 MG SL tablet, Place 1 tablet under the tongue Every 5 (Five) Minutes As Needed for Chest Pain. Take no more than 3 doses in 15 minutes., Disp: 25 tablet, Rfl: 12  •  rosuvastatin (CRESTOR) 40 MG tablet, Take 1 tablet by mouth Every Night., Disp: 90 tablet, Rfl: 3  •  sacubitril-valsartan (ENTRESTO) 24-26 MG tablet, Take 1 tablet by mouth Every 12 (Twelve) Hours., Disp: 60 tablet, Rfl: 6  •  ticagrelor (BRILINTA) 90 MG tablet tablet, Take 1 tablet by mouth 2 (Two) Times a Day., Disp: 60 tablet, Rfl: 11  •  valACYclovir (Valtrex) 1000 MG tablet, Take 1 tablet by mouth 2 (Two) Times a Day., Disp: , Rfl:     Allergies:   No Known Allergies    Objective     Physical Exam: Please see above  Vital Signs:   Vitals:    03/21/23 1058   BP: 100/80   BP Location: Left arm   Patient Position: Sitting   Cuff Size: Adult   Pulse: 95   Resp: 20   Temp: 98.2 °F (36.8 °C)   TempSrc: Temporal   SpO2: 98%   Weight: 81.9 kg (180 lb 8 oz)   Height: 188 cm (74\")   PainSc: 0-No pain     Body mass index is 23.17 kg/m².  BMI is within normal parameters. No other follow-up for BMI required.       Physical Exam  Vitals reviewed.   Constitutional:       General: He is not in acute distress.     Appearance: Normal appearance. He is normal weight. He is not ill-appearing or toxic-appearing.   HENT:      Head: Normocephalic and atraumatic.      Right Ear: External ear normal.      Left Ear: External " ear normal.   Eyes:      General: No scleral icterus.        Right eye: No discharge.         Left eye: No discharge.      Extraocular Movements: Extraocular movements intact.      Pupils: Pupils are equal, round, and reactive to light.   Neck:      Comments: No JVD  Cardiovascular:      Rate and Rhythm: Normal rate and regular rhythm.      Pulses: Normal pulses.      Heart sounds: Normal heart sounds. No murmur heard.    No friction rub. No gallop.      Comments: Life vest in place  Pulmonary:      Effort: Pulmonary effort is normal. No respiratory distress.      Breath sounds: Normal breath sounds. No stridor. No wheezing, rhonchi or rales.   Abdominal:      General: Abdomen is flat. Bowel sounds are normal. There is no distension.      Palpations: Abdomen is soft. There is no mass.      Tenderness: There is no abdominal tenderness. There is no guarding or rebound.   Musculoskeletal:         General: No swelling or deformity. Normal range of motion.      Cervical back: Normal range of motion and neck supple. No rigidity or tenderness.   Lymphadenopathy:      Cervical: No cervical adenopathy.   Skin:     General: Skin is warm and dry.      Capillary Refill: Capillary refill takes less than 2 seconds.      Coloration: Skin is not jaundiced or pale.   Neurological:      General: No focal deficit present.      Mental Status: He is alert and oriented to person, place, and time. Mental status is at baseline.   Psychiatric:         Mood and Affect: Mood normal.         Behavior: Behavior normal.         Judgment: Judgment normal.         Procedures    Results:   Labs:   Hemoglobin A1C   Date Value Ref Range Status   03/02/2023 5.70 (H) 4.80 - 5.60 % Final     TSH   Date Value Ref Range Status   03/01/2023 3.340 0.270 - 4.200 uIU/mL Final        Imaging:   No valid procedures specified.     Assessment / Plan      Assessment/Plan:   Problem List Items Addressed This Visit        Cardiac and Vasculature    Hyperlipidemia  LDL goal <70    Coronary artery disease involving native coronary artery of native heart with unstable angina pectoris (HCC) - Primary    Overview     3/1/2023: Anterior STEMI, mid LAD 10 percent thrombotic occlusion status post OCT guided PCI with a 4.0 x 38 mm Xience KAVON, proximal LAD 30-40%, normal circumflex and RCA, LVEF 25-30% due to anterior apical and distal inferior akinesis, EDP 36 mmHg         Acute systolic heart failure (CMS/HCC)       Endocrine and Metabolic    Prediabetes   Other Visit Diagnoses     Cervical disc disease        Colon cancer screening        Relevant Orders    Cologuard - Stool, Per Rectum        Hyperlipidemia LDL goal <70.  Continue with Crestor 40 mg once a day. Will repeat lab testing at next visit to determine need to further optimize therapy.    Coronary artery disease involving native coronary artery of native heart with unstable angina pectoris.   Keep appointment with cardiology.   Continue with cardiac rehab.   Continue with current medications.   Encouraged patient to continue monitoring his blood pressure at home.     Acute systolic heart failure.   Continue with present management.     Prediabetes.   Encouraged patient to improve diet and exercise.     Colon cancer screening.   Patient is due for a colonoscopy.   He does not have a family history of colon cancer.   Will order Cologuard.           Follow Up:   Return in about 3 months (around 6/21/2023) for Annual.    I spent 48 minutes caring for Devon on this date of service. This time includes time spent by me in the following activities: preparing for the visit, reviewing tests, performing a medically appropriate examination and/or evaluation, counseling and educating the patient/family/caregiver, ordering medications, tests, or procedures and documenting information in the medical record      Galo Borges MD   [facility] [place] [facility]  Transcribed from ambient dictation for Galo Borges MD by Spencer  Reginald.  03/21/23   12:32 EDT    Patient or patient representative verbalized consent to the visit recording.  I have personally performed the services described in this document as transcribed by the above individual, and it is both accurate and complete.

## 2023-03-22 ENCOUNTER — TREATMENT (OUTPATIENT)
Dept: CARDIAC REHAB | Facility: HOSPITAL | Age: 43
End: 2023-03-22
Payer: COMMERCIAL

## 2023-03-22 DIAGNOSIS — I21.02 STEMI INVOLVING LEFT ANTERIOR DESCENDING CORONARY ARTERY: Primary | ICD-10-CM

## 2023-03-22 PROCEDURE — 93797 PHYS/QHP OP CAR RHAB WO ECG: CPT

## 2023-03-22 PROCEDURE — 93798 PHYS/QHP OP CAR RHAB W/ECG: CPT

## 2023-03-22 NOTE — PROGRESS NOTES
"  NUTRITION ASSESSMENT & EDUCATION  CARDIAC/PULMONARY REHAB      Appointment Date and Time: 03/22/23, 10:09 EDT  Patient Name: Devon Smith   Age: 43 y.o.     Sex:  male      Employment/Activity Level:   Devon's education level: college  Occupation:   at [employer]  Job Activity Level: moderate - lots of walking at work  Routine Exercise: moderate - decreased since heart intervention, rehab is increasing strength                                    Weight Assessment:   Height:   Ht Readings from Last 1 Encounters:   03/21/23 188 cm (74\")     Weight:   Wt Readings from Last 1 Encounters:   03/21/23 81.9 kg (180 lb 8 oz)         BMI:    BMI Readings from Last 1 Encounters:   03/21/23 23.17 kg/m²       -------------------------------------------------------------------------------------------------  IBW: Ideal body weight: 82.2 kg (181 lb 3.5 oz)  -------------------------------------------------------------------------------------------------  Weight Assessment: Normal  Weight Change last six months: relatively stable weight       Usual Weight: 180 lb       Desired Weight: 180 lb  Cardiac Risk Factors:         Atherosclerotic heart disease       Hyperlipidemia/hypercholesterolemia       Hypertension       Diabetes mellitus/Pre-diabetes    Pertinent Lab Values:     Total Cholesterol   Date Value Ref Range Status   03/02/2023 232 (H) 0 - 200 mg/dL Final     HDL Cholesterol   Date Value Ref Range Status   03/02/2023 54 40 - 60 mg/dL Final     LDL Cholesterol    Date Value Ref Range Status   03/02/2023 156 (H) 0 - 100 mg/dL Final     LDL/HDL Ratio   Date Value Ref Range Status   03/02/2023 2.84  Final     Triglycerides   Date Value Ref Range Status   03/02/2023 122 0 - 150 mg/dL Final     Hemoglobin A1C   Date Value Ref Range Status   03/02/2023 5.70 (H) 4.80 - 5.60 % Final     TSH   Date Value Ref Range Status   03/01/2023 3.340 0.270 - 4.200 uIU/mL Final     Glucose   Date Value Ref Range Status "   03/03/2023 132 (H) 65 - 99 mg/dL Final     Pertinent Medications:   Nutritional Supplements: reviewed  Pertinent Nutrition-Related Medications: Reviewed - no changes recommended at this time.    Self Identified Problems or Concerns:   Changed diet for prediabetes prior to heart event, then have made more changes. Reading labels. Family of 4 and all have difference preferences/dietary needs. Tend to eat same foods day to day for ease of meal prep    Nutrition Influences:   Appetite: good   Taste/smell changes:  No  Factors limiting PO intake: none  Food records reviewed?: Yes, extensive review of current diet in comparison to AHA guidelines    Devon lives with: wife and daughters  Devon receives lifestyle support from others at home?: Yes  Spouse/significant other present for diet instruction today?: Yes  Diet Assessment:   Diet Survey Score: pending- of possible 72 =     % compliant with AHA guidelines    Meal intake pattern:  2-3 meals/day; home for lunch daily  Dining out/Fast food:  occasionally    24 hour recall:   B 1/2 cup pistachios, 1/2 cup whole cottage cheese, 1/2 cup berries, water   L 2 chicken tacos w/cheese, salsa, water, occasionally ginger ale   D -    Who does the cooking at home:  both           Who does the grocery shopping:  both  Home diet review:  Moderate intake of Saturated Fats, Moderate-acceptable Sodium intake and Has recently made positive changes toward AHA nutrition guidelines     Diet high in saturated fat from dairy products. Low in dried beans. Limited sweets. Good intake fruits, healthy fats/oils, nuts.     Motivation level toward diet compliance:  moderate  Assessment / Recommendations:   Prior diet education before to coming to Cardiac Rehab?: No  Instructed provided on:  Cardiac diet, Lipid management and Label reading for heart health  Written materials provided to patient: Yes    Goals/Plan:   Patient defined goals:   1) increase fish intake weekly, includes shrimp,  scallops, salmon, tuna  2) have a minimum one serving dried beans/garbanzo/hummas weekly  3) change dairy products from full fat to 2% or less fat  4) try to increase vegetable servings - add two or more servings to current intake  5) begin tracking steps w/Apple watch, recently 2500 steps/day, increase to 3000 with further increases as stamina improves    Plan:  Encouraged to complete goals and continue setting new nutrition/exercise goals             Encouraged to follow up with dietitian during cardiac rehab sessions; may request additional RD counseling.    Gail Ag RD, 10:09 EDT - 3/22/2023

## 2023-03-23 NOTE — PROGRESS NOTES
"[facility]  Heart and Valve [facility]      Mode of Visit: Video  Location of patient: home  You have chosen to receive care through a telehealth visit.  Does the patient consent to use a video/audio connection for your medical care today? Yes  The visit included audio and video interaction. No technical issues occurred during this visit.     Chief Complaint  Follow-up and Congestive Heart Failure    History of Present Illness    Devon Smith is a 43 y.o. male with recent STEMI s/p KAVON with LVEF <20% who presents today as a telemedicine follow up for HFrEF.     At his last office visit his carvedilol was changed to metoprolol due to symptomatic hypotension. He is feeling much better. SBPs have been running 90-100s. Has occasional orthostatic lightheadedness. He is doing cardiac rehab and reports he feels better when he works out but does have to take naps later in the day in the fatigue. He walks a mile with his wife on the days he does not do cardiac rehab. Denies chest pain, shortness of breath or edema. Weight has been stable      Objective     Vital Signs:   Vitals:    03/28/23 0757   BP: 107/61   Pulse: 57   Weight: 81.6 kg (180 lb)   Height: 188 cm (74\")     Body mass index is 23.11 kg/m².    Virtual Visit Physical Exam  Physical Exam  Constitutional:       Appearance: Normal appearance.   HENT:      Head: Normocephalic.   Pulmonary:      Effort: Pulmonary effort is normal. No respiratory distress.   Neurological:      Mental Status: He is alert and oriented to person, place, and time.   Psychiatric:         Mood and Affect: Mood normal.         Behavior: Behavior normal.         Thought Content: Thought content normal.              Result Review  Data Reviewed:{ Labs  Result Review  Imaging  Med Tab  Media :23}   Adult Transthoracic Echo Complete W/ Cont if Necessary Per Protocol (03/02/2023 09:06)  ECG 12 Lead (03/02/2023 05:54)  Magnesium (03/03/2023 03:34)  Basic Metabolic Panel " (03/03/2023 03:34)  CBC & Differential (03/03/2023 03:34)  BNP (03/01/2023 15:54)  TSH (03/01/2023 15:54)  Lipid Panel (03/02/2023 03:49)  Hemoglobin A1c (03/02/2023 03:49)             Assessment and Plan {CC Problem List  Visit Diagnosis  ROS  Review (Popup)  Health Maintenance  Quality  BestPractice  Medications  SmartSets  SnapShot Encounters  Media :23}   1. Acute systolic heart failure (CMS/HCC)  - Appears euvolemic  - Unable to tolerate Entresto due to hypotension.Advised to call if SBP consistently greater than 110.  Change metoprolol succinate to nightly. Will start jardiance 10mg daily. Discussed potential side effects.  - Continue LifeVest    2. Coronary artery disease involving coronary bypass graft of native heart without angina pectoris  - s/p LAD stent 3/1  - Continue DAPT, statin      Keep scheduled follow up with     Follow Up {Instructions Charge Capture  Follow-up Communications :23}   Return if symptoms worsen or fail to improve.    Patient was given instructions and counseling regarding his condition or for health maintenance advice. Please see specific information pulled into the AVS if appropriate.  Advised to call the Heart and Valve Center with any questions, concerns, or worsening symptoms.    Dictated Utilizing Dragon Dictation

## 2023-03-24 ENCOUNTER — PATIENT MESSAGE (OUTPATIENT)
Dept: INTERNAL MEDICINE | Facility: CLINIC | Age: 43
End: 2023-03-24
Payer: COMMERCIAL

## 2023-03-24 ENCOUNTER — TREATMENT (OUTPATIENT)
Dept: CARDIAC REHAB | Facility: HOSPITAL | Age: 43
End: 2023-03-24
Payer: COMMERCIAL

## 2023-03-24 DIAGNOSIS — I21.02 STEMI INVOLVING LEFT ANTERIOR DESCENDING CORONARY ARTERY: Primary | ICD-10-CM

## 2023-03-24 PROCEDURE — 93798 PHYS/QHP OP CAR RHAB W/ECG: CPT

## 2023-03-24 NOTE — TELEPHONE ENCOUNTER
From: Devon Smith  To: Galo Borges  Sent: 3/24/2023 7:31 AM EDT  Subject: FMLA paperwork    Hello,     My employer ([employer]) says they need a doctors written release before I return to work on March 30th. Please advise on how I can obtain this. Thanks!

## 2023-03-27 ENCOUNTER — TREATMENT (OUTPATIENT)
Dept: CARDIAC REHAB | Facility: HOSPITAL | Age: 43
End: 2023-03-27
Payer: COMMERCIAL

## 2023-03-27 DIAGNOSIS — I21.02 STEMI INVOLVING LEFT ANTERIOR DESCENDING CORONARY ARTERY: Primary | ICD-10-CM

## 2023-03-27 PROCEDURE — 93798 PHYS/QHP OP CAR RHAB W/ECG: CPT

## 2023-03-28 ENCOUNTER — TELEMEDICINE (OUTPATIENT)
Dept: CARDIOLOGY | Facility: HOSPITAL | Age: 43
End: 2023-03-28
Payer: COMMERCIAL

## 2023-03-28 ENCOUNTER — DOCUMENTATION (OUTPATIENT)
Dept: CARDIOLOGY | Facility: HOSPITAL | Age: 43
End: 2023-03-28
Payer: COMMERCIAL

## 2023-03-28 ENCOUNTER — APPOINTMENT (OUTPATIENT)
Dept: CARDIAC REHAB | Facility: HOSPITAL | Age: 43
End: 2023-03-28
Payer: COMMERCIAL

## 2023-03-28 VITALS
WEIGHT: 180 LBS | DIASTOLIC BLOOD PRESSURE: 61 MMHG | BODY MASS INDEX: 23.1 KG/M2 | HEIGHT: 74 IN | SYSTOLIC BLOOD PRESSURE: 107 MMHG | HEART RATE: 57 BPM

## 2023-03-28 DIAGNOSIS — I50.21 ACUTE SYSTOLIC HEART FAILURE: Primary | ICD-10-CM

## 2023-03-28 DIAGNOSIS — I25.810 CORONARY ARTERY DISEASE INVOLVING CORONARY BYPASS GRAFT OF NATIVE HEART WITHOUT ANGINA PECTORIS: ICD-10-CM

## 2023-03-28 PROCEDURE — 99213 OFFICE O/P EST LOW 20 MIN: CPT | Performed by: NURSE PRACTITIONER

## 2023-03-29 ENCOUNTER — TREATMENT (OUTPATIENT)
Dept: CARDIAC REHAB | Facility: HOSPITAL | Age: 43
End: 2023-03-29
Payer: COMMERCIAL

## 2023-03-29 ENCOUNTER — APPOINTMENT (OUTPATIENT)
Dept: CARDIAC REHAB | Facility: HOSPITAL | Age: 43
End: 2023-03-29
Payer: COMMERCIAL

## 2023-03-29 DIAGNOSIS — I21.02 STEMI INVOLVING LEFT ANTERIOR DESCENDING CORONARY ARTERY: Primary | ICD-10-CM

## 2023-03-29 PROCEDURE — 93798 PHYS/QHP OP CAR RHAB W/ECG: CPT

## 2023-03-31 ENCOUNTER — TREATMENT (OUTPATIENT)
Dept: CARDIAC REHAB | Facility: HOSPITAL | Age: 43
End: 2023-03-31
Payer: COMMERCIAL

## 2023-03-31 DIAGNOSIS — I21.02 STEMI INVOLVING LEFT ANTERIOR DESCENDING CORONARY ARTERY: Primary | ICD-10-CM

## 2023-03-31 PROBLEM — I25.10 CORONARY ARTERY DISEASE INVOLVING NATIVE CORONARY ARTERY OF NATIVE HEART WITHOUT ANGINA PECTORIS: Status: ACTIVE | Noted: 2023-03-01

## 2023-03-31 PROCEDURE — 93798 PHYS/QHP OP CAR RHAB W/ECG: CPT

## 2023-03-31 NOTE — PROGRESS NOTES
OFFICE VISIT  NOTE  [facility] CARDIOLOGY MAIN CAMPUS      Name: Devon Smith    Date: 2023  MRN:  [id number]  :  1980      REFERRING/PRIMARY PROVIDER:  Galo Borges MD     Chief Complaint   Patient presents with   • Coronary Artery Disease   • STEMI     HPI: Devon Smith is a 43 y.o. male who presents today for hospital follow up from 3/1-3/4/23 for anterior STEMI. History of hyperlipidemia, presented with substernal chest pain/pressure with radiation to bilateral arms and jaws. Emergent cath 3/1/23 with KAVON to 100% occluded LAD.  Hospital course has been complicated by acute systolic heart failure with an EF less than 20%. Discharged with lifevest in place, has followed up with Heart and Valve center and started on Jardiance 10mg, was intolerant to Entresto due to hypotension. Participating in cardiac rehab 3 times a week, and denies any chest pain or unusual dyspnea while exercising on treadmill or bike. He denies any LE edema, dyspnea, orthopnea or heart failure symptoms. He has had issues with hypotension, however has been asymptomatic with this, and denies dizziness, pre-syncope or syncope.     ROS:Pertinent positives as listed in the HPI.  All other systems reviewed and negative.    Past Medical History:   Diagnosis Date   • Allergic 76   • Hyperlipidemia 2022   • MRSA cellulitis    • Myocardial infarction 3/1/2023       Past Surgical History:   Procedure Laterality Date   • CARDIAC CATHETERIZATION N/A 2023    Procedure: Left Heart Cath;  Surgeon: Arsenio Ramos MD;  Location:  [facility];  Service: Cardiovascular;  Laterality: N/A;   • CARDIAC CATHETERIZATION N/A 2023    Procedure: Optical Coherent Tomography;  Surgeon: Arsenio Ramos MD;  Location:  [facility];  Service: Cardiovascular;  Laterality: N/A;   • CARDIAC CATHETERIZATION N/A 2023    Procedure: Percutaneous Coronary Intervention;   "Surgeon: Arsenio Ramos MD;  Location: [facility] INVASIVE LOCATION;  Service: Cardiovascular;  Laterality: N/A;   • CORONARY STENT PLACEMENT  3/1/2023   • INCISION AND DRAINAGE ABSCESS  2013       Social History     Socioeconomic History   • Marital status:    Tobacco Use   • Smoking status: Never     Passive exposure: Never   • Smokeless tobacco: Never   Vaping Use   • Vaping Use: Never used   Substance and Sexual Activity   • Alcohol use: Never   • Drug use: Never   • Sexual activity: Not Currently     Partners: Female       Family History   Problem Relation Age of Onset   • Migraine headaches Mother    • Heart attack Father 67        CABG   • Heart disease Maternal Grandmother         heart failure   • Diabetes Maternal Grandmother    • Heart attack Paternal Grandfather         CAD   • Colon cancer Neg Hx    • Prostate cancer Neg Hx         No Known Allergies    Current Outpatient Medications   Medication Instructions   • aspirin 81 mg, Oral, Daily   • empagliflozin (JARDIANCE) 10 mg, Oral, Daily   • escitalopram (LEXAPRO) 10 mg, Oral, Daily   • hydrOXYzine (ATARAX) 25 mg, Oral, Every 8 Hours PRN   • metoprolol succinate XL (TOPROL-XL) 25 mg, Oral, Daily   • nitroglycerin (NITROSTAT) 0.4 mg, Sublingual, Every 5 Minutes PRN, Take no more than 3 doses in 15 minutes.   • rosuvastatin (CRESTOR) 40 mg, Oral, Nightly   • ticagrelor (BRILINTA) 90 mg, Oral, 2 Times Daily   • valACYclovir (VALTREX) 1,000 mg, Oral, 2 Times Daily PRN       Vitals:    04/13/23 0947   BP: (!) 88/64   BP Location: Left arm   Patient Position: Sitting   Pulse: 60   SpO2: 98%   Weight: 80.3 kg (177 lb)   Height: 188 cm (74.02\")     Body mass index is 22.72 kg/m².    PHYSICAL EXAM:    General Appearance:   · well developed  · well nourished  Neck:  · thyroid not enlarged  · supple  Respiratory:  · no respiratory distress  · normal breath sounds  · no rales  Cardiovascular:  · no jugular venous distention  · regular rhythm  · apical " impulse normal  · S1 normal, S2 normal  · no S3, no S4   · no murmur  · no rub, no thrill  · lower extremity edema: none    Skin:   warm, dry    RESULTS:   Procedures    Results for orders placed during the hospital encounter of 03/01/23    Adult Transthoracic Echo Complete W/ Cont if Necessary Per Protocol    Interpretation Summary  •  Left ventricular systolic function is severely decreased. Calculated left ventricular EF = 15.7% Left ventricular ejection fraction appears to be less than 20%.  •  Left ventricular wall thickness is consistent with mild septal asymmetric hypertrophy.  •  The following left ventricular wall segments are hypokinetic: basal inferoseptal and basal inferoseptal. The following left ventricular wall segments are akinetic: mid anterior, apical anterior, mid anterolateral, apical lateral, apical inferior, mid inferior, apical septal, mid inferoseptal, apex and mid anteroseptal. The following left ventricular wall segments are hyperkinetic: basal anterior, basal anterolateral and basal inferior.  •  Left ventricular diastolic function is consistent with (grade II w/high LAP) pseudonormalization.  •  There is a trivial pericardial effusion.  •  No significant structural or functional valvular disease.  •  No evidence of LV thrombus with contrast-enhanced images.        Labs:  Lab Results   Component Value Date    CHOL 232 (H) 03/02/2023    TRIG 122 03/02/2023    HDL 54 03/02/2023     (H) 03/02/2023    AST 14 03/01/2023    ALT 15 03/01/2023     Lab Results   Component Value Date    HGBA1C 5.70 (H) 03/02/2023     Creatinine   Date Value Ref Range Status   03/03/2023 0.91 0.76 - 1.27 mg/dL Final   03/02/2023 0.87 0.76 - 1.27 mg/dL Final   03/01/2023 0.71 (L) 0.76 - 1.27 mg/dL Final   03/01/2023 0.90 0.60 - 1.30 mg/dL Final     Comment:     Serial Number: [id number]:  [id number]     No results found for: EGFRIFNONA      ASSESSMENT:  Problem List Items Addressed This Visit        Cardiac and  Vasculature    STEMI involving LAD (HCC)    Relevant Orders    Adult Transthoracic Echo Limited W/ Cont if Necessary Per Protocol    Hyperlipidemia LDL goal <70    Relevant Orders    Comprehensive Metabolic Panel    Lipid Panel    Essential hypertension    Relevant Medications    lisinopril (PRINIVIL,ZESTRIL) 2.5 MG tablet    Coronary artery disease involving native coronary artery of native heart without angina pectoris - Primary    Overview     3/1/2023: Anterior STEMI, mid LAD 10 percent thrombotic occlusion status post OCT guided PCI with a 4.0 x 38 mm Xience KAVON, proximal LAD 30-40%, normal circumflex and RCA, LVEF 25-30% due to anterior apical and distal inferior akinesis, EDP 36 mmHg         Relevant Orders    Comprehensive Metabolic Panel    Lipid Panel    Acute systolic heart failure (CMS/HCC)    Overview     · Echo 3/2/23: EF <20%, hypokinetic wall segments, grade II diastolic dysfunction, no significant valvular disease, no LV thrombus         Relevant Orders    Adult Transthoracic Echo Limited W/ Cont if Necessary Per Protocol   Other Visit Diagnoses     Ischemic cardiomyopathy        Relevant Orders    Adult Transthoracic Echo Limited W/ Cont if Necessary Per Protocol    Comprehensive Metabolic Panel    Lipid Panel          PLAN:  1.  CAD with anterior STEMI 3/2023:  [facility] 3/1 with 100% mid LAD thrombotic occlusion s/p KAVON, normal RCA and LCx  Continue ASA, Brilinta, BB, statin   Active in cardiac rehab without any angina or anginal equivalent     2.  Ischemic cardiomyopathy EF <20%, Systolic heart failure   Could not tolerate Entresto due to hypotension, Coreg was changed to Metoprolol   Blood pressure too low for AA  Continue Jardiance 10mg daily and BB   Will add low dose Lisinopril 2.5mg daily for ICM, he will watch his BP and symptoms and will let us know if he cannot tolerate   Repeat echo in 2 months to relook LVEF   Continue Lifevest in meantime     3.  Mixed hyperlipidemia:    Started  Crestor 40mg (he was intolerant to atorvastatin previously)  Check lipid panel and LFTs         Advance Care Planning   ACP discussion was held with the patient during this visit. Patient does not have an advance directive, declines further assistance.          Follow-up   Return in about 3 months (around 7/13/2023) for with RDS.        Electronically signed by Lizabeth Live PA-C, 04/13/23, 10:22 AM EDT.

## 2023-04-01 ENCOUNTER — TELEPHONE (OUTPATIENT)
Dept: INTERNAL MEDICINE | Facility: CLINIC | Age: 43
End: 2023-04-01
Payer: COMMERCIAL

## 2023-04-01 NOTE — TELEPHONE ENCOUNTER
Note by Dr. Justo Miguel  Onychomycosis  -Previously referred to podiatry    Herpes labialis  -Prescribed Valtrex 1 g twice daily as needed    Lipid panel dated 4/13/2022  HDL 51  Triglyceride 120  Total cholesterol 271 (elevated)   (very high)    CMP same date: Grossly normal  TSH same date: 2.18  CBC same date: Grossly normal  A1c same date: 5.9 consistent with prediabetes    Dr. Borges

## 2023-04-03 ENCOUNTER — TREATMENT (OUTPATIENT)
Dept: CARDIAC REHAB | Facility: HOSPITAL | Age: 43
End: 2023-04-03
Payer: COMMERCIAL

## 2023-04-03 DIAGNOSIS — I21.02 STEMI INVOLVING LEFT ANTERIOR DESCENDING CORONARY ARTERY: Primary | ICD-10-CM

## 2023-04-03 PROCEDURE — 93798 PHYS/QHP OP CAR RHAB W/ECG: CPT

## 2023-04-05 ENCOUNTER — TREATMENT (OUTPATIENT)
Dept: CARDIAC REHAB | Facility: HOSPITAL | Age: 43
End: 2023-04-05
Payer: COMMERCIAL

## 2023-04-05 DIAGNOSIS — I21.02 STEMI INVOLVING LEFT ANTERIOR DESCENDING CORONARY ARTERY: Primary | ICD-10-CM

## 2023-04-05 PROCEDURE — 93798 PHYS/QHP OP CAR RHAB W/ECG: CPT

## 2023-04-07 ENCOUNTER — TREATMENT (OUTPATIENT)
Dept: CARDIAC REHAB | Facility: HOSPITAL | Age: 43
End: 2023-04-07
Payer: COMMERCIAL

## 2023-04-07 DIAGNOSIS — I21.02 STEMI INVOLVING LEFT ANTERIOR DESCENDING CORONARY ARTERY: Primary | ICD-10-CM

## 2023-04-07 PROCEDURE — 93798 PHYS/QHP OP CAR RHAB W/ECG: CPT

## 2023-04-10 ENCOUNTER — TREATMENT (OUTPATIENT)
Dept: CARDIAC REHAB | Facility: HOSPITAL | Age: 43
End: 2023-04-10
Payer: COMMERCIAL

## 2023-04-10 DIAGNOSIS — I21.02 STEMI INVOLVING LEFT ANTERIOR DESCENDING CORONARY ARTERY: Primary | ICD-10-CM

## 2023-04-10 PROCEDURE — 93798 PHYS/QHP OP CAR RHAB W/ECG: CPT

## 2023-04-12 ENCOUNTER — TREATMENT (OUTPATIENT)
Dept: CARDIAC REHAB | Facility: HOSPITAL | Age: 43
End: 2023-04-12
Payer: COMMERCIAL

## 2023-04-12 DIAGNOSIS — I21.02 STEMI INVOLVING LEFT ANTERIOR DESCENDING CORONARY ARTERY: Primary | ICD-10-CM

## 2023-04-12 PROCEDURE — 93798 PHYS/QHP OP CAR RHAB W/ECG: CPT

## 2023-04-13 ENCOUNTER — OFFICE VISIT (OUTPATIENT)
Dept: CARDIOLOGY | Facility: CLINIC | Age: 43
End: 2023-04-13
Payer: COMMERCIAL

## 2023-04-13 VITALS
SYSTOLIC BLOOD PRESSURE: 88 MMHG | DIASTOLIC BLOOD PRESSURE: 64 MMHG | HEART RATE: 60 BPM | OXYGEN SATURATION: 98 % | BODY MASS INDEX: 22.72 KG/M2 | HEIGHT: 74 IN | WEIGHT: 177 LBS

## 2023-04-13 DIAGNOSIS — E78.5 HYPERLIPIDEMIA LDL GOAL <70: ICD-10-CM

## 2023-04-13 DIAGNOSIS — I25.5 ISCHEMIC CARDIOMYOPATHY: ICD-10-CM

## 2023-04-13 DIAGNOSIS — I10 ESSENTIAL HYPERTENSION: ICD-10-CM

## 2023-04-13 DIAGNOSIS — I25.10 CORONARY ARTERY DISEASE INVOLVING NATIVE CORONARY ARTERY OF NATIVE HEART WITHOUT ANGINA PECTORIS: Primary | ICD-10-CM

## 2023-04-13 DIAGNOSIS — I50.21 ACUTE SYSTOLIC HEART FAILURE: ICD-10-CM

## 2023-04-13 DIAGNOSIS — I21.02 STEMI INVOLVING LEFT ANTERIOR DESCENDING CORONARY ARTERY: ICD-10-CM

## 2023-04-13 RX ORDER — LISINOPRIL 2.5 MG/1
2.5 TABLET ORAL DAILY
Qty: 30 TABLET | Refills: 5 | Status: SHIPPED | OUTPATIENT
Start: 2023-04-13

## 2023-04-14 ENCOUNTER — TREATMENT (OUTPATIENT)
Dept: CARDIAC REHAB | Facility: HOSPITAL | Age: 43
End: 2023-04-14
Payer: COMMERCIAL

## 2023-04-14 DIAGNOSIS — I21.02 STEMI INVOLVING LEFT ANTERIOR DESCENDING CORONARY ARTERY: Primary | ICD-10-CM

## 2023-04-14 PROCEDURE — 93798 PHYS/QHP OP CAR RHAB W/ECG: CPT

## 2023-04-17 ENCOUNTER — LAB (OUTPATIENT)
Dept: LAB | Facility: HOSPITAL | Age: 43
End: 2023-04-17
Payer: COMMERCIAL

## 2023-04-17 ENCOUNTER — TELEPHONE (OUTPATIENT)
Dept: CARDIOLOGY | Facility: CLINIC | Age: 43
End: 2023-04-17
Payer: COMMERCIAL

## 2023-04-17 ENCOUNTER — TREATMENT (OUTPATIENT)
Dept: CARDIAC REHAB | Facility: HOSPITAL | Age: 43
End: 2023-04-17
Payer: COMMERCIAL

## 2023-04-17 DIAGNOSIS — E78.5 HYPERLIPIDEMIA LDL GOAL <70: ICD-10-CM

## 2023-04-17 DIAGNOSIS — I25.10 CORONARY ARTERY DISEASE INVOLVING NATIVE CORONARY ARTERY OF NATIVE HEART WITHOUT ANGINA PECTORIS: ICD-10-CM

## 2023-04-17 DIAGNOSIS — I25.5 ISCHEMIC CARDIOMYOPATHY: ICD-10-CM

## 2023-04-17 DIAGNOSIS — I21.02 STEMI INVOLVING LEFT ANTERIOR DESCENDING CORONARY ARTERY: Primary | ICD-10-CM

## 2023-04-17 LAB
ALBUMIN SERPL-MCNC: 4.6 G/DL (ref 3.5–5.2)
ALBUMIN/GLOB SERPL: 1.8 G/DL
ALP SERPL-CCNC: 60 U/L (ref 39–117)
ALT SERPL W P-5'-P-CCNC: 23 U/L (ref 1–41)
ANION GAP SERPL CALCULATED.3IONS-SCNC: 10.7 MMOL/L (ref 5–15)
AST SERPL-CCNC: 21 U/L (ref 1–40)
BILIRUB SERPL-MCNC: 1.1 MG/DL (ref 0–1.2)
BUN SERPL-MCNC: 18 MG/DL (ref 6–20)
BUN/CREAT SERPL: 16.8 (ref 7–25)
CALCIUM SPEC-SCNC: 9.9 MG/DL (ref 8.6–10.5)
CHLORIDE SERPL-SCNC: 104 MMOL/L (ref 98–107)
CHOLEST SERPL-MCNC: 108 MG/DL (ref 0–200)
CO2 SERPL-SCNC: 25.3 MMOL/L (ref 22–29)
CREAT SERPL-MCNC: 1.07 MG/DL (ref 0.76–1.27)
EGFRCR SERPLBLD CKD-EPI 2021: 87.2 ML/MIN/1.73
GLOBULIN UR ELPH-MCNC: 2.6 GM/DL
GLUCOSE SERPL-MCNC: 94 MG/DL (ref 65–99)
HDLC SERPL-MCNC: 44 MG/DL (ref 40–60)
LDLC SERPL CALC-MCNC: 50 MG/DL (ref 0–100)
LDLC/HDLC SERPL: 1.15 {RATIO}
POTASSIUM SERPL-SCNC: 4.4 MMOL/L (ref 3.5–5.2)
PROT SERPL-MCNC: 7.2 G/DL (ref 6–8.5)
SODIUM SERPL-SCNC: 140 MMOL/L (ref 136–145)
TRIGL SERPL-MCNC: 67 MG/DL (ref 0–150)
VLDLC SERPL-MCNC: 14 MG/DL (ref 5–40)

## 2023-04-17 PROCEDURE — 93798 PHYS/QHP OP CAR RHAB W/ECG: CPT

## 2023-04-17 PROCEDURE — 80061 LIPID PANEL: CPT

## 2023-04-17 PROCEDURE — 36415 COLL VENOUS BLD VENIPUNCTURE: CPT

## 2023-04-17 PROCEDURE — 80053 COMPREHEN METABOLIC PANEL: CPT

## 2023-04-19 ENCOUNTER — TREATMENT (OUTPATIENT)
Dept: CARDIAC REHAB | Facility: HOSPITAL | Age: 43
End: 2023-04-19
Payer: COMMERCIAL

## 2023-04-19 DIAGNOSIS — I21.02 STEMI INVOLVING LEFT ANTERIOR DESCENDING CORONARY ARTERY: Primary | ICD-10-CM

## 2023-04-19 PROCEDURE — 93798 PHYS/QHP OP CAR RHAB W/ECG: CPT

## 2023-04-21 ENCOUNTER — TREATMENT (OUTPATIENT)
Dept: CARDIAC REHAB | Facility: HOSPITAL | Age: 43
End: 2023-04-21
Payer: COMMERCIAL

## 2023-04-21 DIAGNOSIS — I21.02 STEMI INVOLVING LEFT ANTERIOR DESCENDING CORONARY ARTERY: Primary | ICD-10-CM

## 2023-04-21 PROCEDURE — 93798 PHYS/QHP OP CAR RHAB W/ECG: CPT

## 2023-04-24 ENCOUNTER — TREATMENT (OUTPATIENT)
Dept: CARDIAC REHAB | Facility: HOSPITAL | Age: 43
End: 2023-04-24
Payer: COMMERCIAL

## 2023-04-24 DIAGNOSIS — I21.02 STEMI INVOLVING LEFT ANTERIOR DESCENDING CORONARY ARTERY: Primary | ICD-10-CM

## 2023-04-24 PROCEDURE — 93798 PHYS/QHP OP CAR RHAB W/ECG: CPT

## 2023-04-26 ENCOUNTER — TREATMENT (OUTPATIENT)
Dept: CARDIAC REHAB | Facility: HOSPITAL | Age: 43
End: 2023-04-26
Payer: COMMERCIAL

## 2023-04-26 DIAGNOSIS — I21.02 STEMI INVOLVING LEFT ANTERIOR DESCENDING CORONARY ARTERY: Primary | ICD-10-CM

## 2023-04-26 PROCEDURE — 93798 PHYS/QHP OP CAR RHAB W/ECG: CPT

## 2023-04-28 ENCOUNTER — TREATMENT (OUTPATIENT)
Dept: CARDIAC REHAB | Facility: HOSPITAL | Age: 43
End: 2023-04-28
Payer: COMMERCIAL

## 2023-04-28 DIAGNOSIS — I21.02 STEMI INVOLVING LEFT ANTERIOR DESCENDING CORONARY ARTERY: Primary | ICD-10-CM

## 2023-04-28 PROCEDURE — 93798 PHYS/QHP OP CAR RHAB W/ECG: CPT

## 2023-05-01 ENCOUNTER — TREATMENT (OUTPATIENT)
Dept: CARDIAC REHAB | Facility: HOSPITAL | Age: 43
End: 2023-05-01
Payer: COMMERCIAL

## 2023-05-01 DIAGNOSIS — I21.02 STEMI INVOLVING LEFT ANTERIOR DESCENDING CORONARY ARTERY: Primary | ICD-10-CM

## 2023-05-01 PROCEDURE — 93798 PHYS/QHP OP CAR RHAB W/ECG: CPT

## 2023-05-01 RX ORDER — ESCITALOPRAM OXALATE 10 MG/1
10 TABLET ORAL DAILY
Qty: 30 TABLET | Refills: 0 | Status: SHIPPED | OUTPATIENT
Start: 2023-05-01

## 2023-05-01 RX ORDER — ESCITALOPRAM OXALATE 10 MG/1
10 TABLET ORAL DAILY
Qty: 30 TABLET | Refills: 1 | OUTPATIENT
Start: 2023-05-01

## 2023-05-03 ENCOUNTER — TREATMENT (OUTPATIENT)
Dept: CARDIAC REHAB | Facility: HOSPITAL | Age: 43
End: 2023-05-03
Payer: COMMERCIAL

## 2023-05-03 DIAGNOSIS — I21.02 STEMI INVOLVING LEFT ANTERIOR DESCENDING CORONARY ARTERY: Primary | ICD-10-CM

## 2023-05-03 PROCEDURE — 93798 PHYS/QHP OP CAR RHAB W/ECG: CPT

## 2023-05-08 ENCOUNTER — TREATMENT (OUTPATIENT)
Dept: CARDIAC REHAB | Facility: HOSPITAL | Age: 43
End: 2023-05-08
Payer: COMMERCIAL

## 2023-05-08 DIAGNOSIS — I21.02 STEMI INVOLVING LEFT ANTERIOR DESCENDING CORONARY ARTERY: Primary | ICD-10-CM

## 2023-05-08 PROCEDURE — 93798 PHYS/QHP OP CAR RHAB W/ECG: CPT

## 2023-05-10 ENCOUNTER — TREATMENT (OUTPATIENT)
Dept: CARDIAC REHAB | Facility: HOSPITAL | Age: 43
End: 2023-05-10
Payer: COMMERCIAL

## 2023-05-10 DIAGNOSIS — I21.02 STEMI INVOLVING LEFT ANTERIOR DESCENDING CORONARY ARTERY: Primary | ICD-10-CM

## 2023-05-10 PROCEDURE — 93798 PHYS/QHP OP CAR RHAB W/ECG: CPT

## 2023-05-12 ENCOUNTER — TREATMENT (OUTPATIENT)
Dept: CARDIAC REHAB | Facility: HOSPITAL | Age: 43
End: 2023-05-12
Payer: COMMERCIAL

## 2023-05-12 DIAGNOSIS — I21.02 STEMI INVOLVING LEFT ANTERIOR DESCENDING CORONARY ARTERY: Primary | ICD-10-CM

## 2023-05-12 PROCEDURE — 93798 PHYS/QHP OP CAR RHAB W/ECG: CPT

## 2023-05-15 ENCOUNTER — TREATMENT (OUTPATIENT)
Dept: CARDIAC REHAB | Facility: HOSPITAL | Age: 43
End: 2023-05-15
Payer: COMMERCIAL

## 2023-05-15 DIAGNOSIS — I21.02 STEMI INVOLVING LEFT ANTERIOR DESCENDING CORONARY ARTERY: Primary | ICD-10-CM

## 2023-05-15 PROCEDURE — 93798 PHYS/QHP OP CAR RHAB W/ECG: CPT

## 2023-05-17 ENCOUNTER — TREATMENT (OUTPATIENT)
Dept: CARDIAC REHAB | Facility: HOSPITAL | Age: 43
End: 2023-05-17
Payer: COMMERCIAL

## 2023-05-17 DIAGNOSIS — I21.02 STEMI INVOLVING LEFT ANTERIOR DESCENDING CORONARY ARTERY: Primary | ICD-10-CM

## 2023-05-17 PROCEDURE — 93798 PHYS/QHP OP CAR RHAB W/ECG: CPT

## 2023-05-22 ENCOUNTER — TREATMENT (OUTPATIENT)
Dept: CARDIAC REHAB | Facility: HOSPITAL | Age: 43
End: 2023-05-22
Payer: COMMERCIAL

## 2023-05-22 DIAGNOSIS — I21.02 STEMI INVOLVING LEFT ANTERIOR DESCENDING CORONARY ARTERY: Primary | ICD-10-CM

## 2023-05-22 PROCEDURE — 93798 PHYS/QHP OP CAR RHAB W/ECG: CPT

## 2023-05-24 ENCOUNTER — TREATMENT (OUTPATIENT)
Dept: CARDIAC REHAB | Facility: HOSPITAL | Age: 43
End: 2023-05-24
Payer: COMMERCIAL

## 2023-05-24 DIAGNOSIS — I21.02 STEMI INVOLVING LEFT ANTERIOR DESCENDING CORONARY ARTERY: Primary | ICD-10-CM

## 2023-05-24 PROCEDURE — 93798 PHYS/QHP OP CAR RHAB W/ECG: CPT

## 2023-05-26 ENCOUNTER — TREATMENT (OUTPATIENT)
Dept: CARDIAC REHAB | Facility: HOSPITAL | Age: 43
End: 2023-05-26
Payer: COMMERCIAL

## 2023-05-26 DIAGNOSIS — I21.02 STEMI INVOLVING LEFT ANTERIOR DESCENDING CORONARY ARTERY: Primary | ICD-10-CM

## 2023-05-26 PROCEDURE — 93798 PHYS/QHP OP CAR RHAB W/ECG: CPT

## 2023-05-31 ENCOUNTER — TREATMENT (OUTPATIENT)
Dept: CARDIAC REHAB | Facility: HOSPITAL | Age: 43
End: 2023-05-31
Payer: COMMERCIAL

## 2023-05-31 DIAGNOSIS — I21.02 STEMI INVOLVING LEFT ANTERIOR DESCENDING CORONARY ARTERY: Primary | ICD-10-CM

## 2023-05-31 PROCEDURE — 93798 PHYS/QHP OP CAR RHAB W/ECG: CPT

## 2023-06-02 ENCOUNTER — TREATMENT (OUTPATIENT)
Dept: CARDIAC REHAB | Facility: HOSPITAL | Age: 43
End: 2023-06-02
Payer: COMMERCIAL

## 2023-06-02 ENCOUNTER — HOSPITAL ENCOUNTER (OUTPATIENT)
Dept: CARDIOLOGY | Facility: HOSPITAL | Age: 43
Discharge: HOME | End: 2023-06-02

## 2023-06-02 VITALS
BODY MASS INDEX: 22.72 KG/M2 | WEIGHT: 177.03 LBS | SYSTOLIC BLOOD PRESSURE: 100 MMHG | HEIGHT: 74 IN | DIASTOLIC BLOOD PRESSURE: 73 MMHG

## 2023-06-02 DIAGNOSIS — I21.02 STEMI INVOLVING LEFT ANTERIOR DESCENDING CORONARY ARTERY: ICD-10-CM

## 2023-06-02 DIAGNOSIS — I25.5 ISCHEMIC CARDIOMYOPATHY: ICD-10-CM

## 2023-06-02 DIAGNOSIS — I50.21 ACUTE SYSTOLIC HEART FAILURE: ICD-10-CM

## 2023-06-02 DIAGNOSIS — I21.02 STEMI INVOLVING LEFT ANTERIOR DESCENDING CORONARY ARTERY: Primary | ICD-10-CM

## 2023-06-02 LAB
BH CV ECHO MEAS - EDV(CUBED): 144.8 ML
BH CV ECHO MEAS - EDV(MOD-SP2): 103.2 ML
BH CV ECHO MEAS - EDV(MOD-SP4): 209 ML
BH CV ECHO MEAS - EF(MOD-SP2): 38.6 %
BH CV ECHO MEAS - EF(MOD-SP4): 31.1 %
BH CV ECHO MEAS - ESV(CUBED): 88 ML
BH CV ECHO MEAS - ESV(MOD-SP2): 63.3 ML
BH CV ECHO MEAS - ESV(MOD-SP4): 144 ML
BH CV ECHO MEAS - FS: 15.3 %
BH CV ECHO MEAS - IVS/LVPW: 1.01 CM
BH CV ECHO MEAS - IVSD: 0.98 CM
BH CV ECHO MEAS - LV DIASTOLIC VOL/BSA (35-75): 101.3 CM2
BH CV ECHO MEAS - LV MASS(C)D: 190.7 GRAMS
BH CV ECHO MEAS - LV SYSTOLIC VOL/BSA (12-30): 69.8 CM2
BH CV ECHO MEAS - LVIDD: 5.3 CM
BH CV ECHO MEAS - LVIDS: 4.4 CM
BH CV ECHO MEAS - LVPWD: 0.97 CM
BH CV ECHO MEAS - SI(MOD-SP2): 19.3 ML/M2
BH CV ECHO MEAS - SI(MOD-SP4): 31.5 ML/M2
BH CV ECHO MEAS - SV(MOD-SP2): 39.9 ML
BH CV ECHO MEAS - SV(MOD-SP4): 65 ML
MAXIMAL PREDICTED HEART RATE: 175 BPM
STRESS TARGET HR: 149 BPM

## 2023-06-02 PROCEDURE — 25010000002 SULFUR HEXAFLUORIDE MICROSPH 60.7-25 MG RECONSTITUTED SUSPENSION: Performed by: PHYSICIAN ASSISTANT

## 2023-06-02 PROCEDURE — 93308 TTE F-UP OR LMTD: CPT | Performed by: INTERNAL MEDICINE

## 2023-06-02 PROCEDURE — 93308 TTE F-UP OR LMTD: CPT

## 2023-06-02 PROCEDURE — 93798 PHYS/QHP OP CAR RHAB W/ECG: CPT

## 2023-06-02 RX ADMIN — SULFUR HEXAFLUORIDE 4 ML: KIT at 10:45

## 2023-06-05 ENCOUNTER — TREATMENT (OUTPATIENT)
Dept: CARDIAC REHAB | Facility: HOSPITAL | Age: 43
End: 2023-06-05
Payer: COMMERCIAL

## 2023-06-05 DIAGNOSIS — I21.02 STEMI INVOLVING LEFT ANTERIOR DESCENDING CORONARY ARTERY: Primary | ICD-10-CM

## 2023-06-05 PROCEDURE — 93798 PHYS/QHP OP CAR RHAB W/ECG: CPT

## 2023-06-06 ENCOUNTER — DOCUMENTATION (OUTPATIENT)
Dept: CARDIOLOGY | Facility: CLINIC | Age: 43
End: 2023-06-06
Payer: COMMERCIAL

## 2023-06-06 ENCOUNTER — TELEPHONE (OUTPATIENT)
Dept: CARDIOLOGY | Facility: CLINIC | Age: 43
End: 2023-06-06
Payer: COMMERCIAL

## 2023-06-06 DIAGNOSIS — I50.22 CHRONIC SYSTOLIC (CONGESTIVE) HEART FAILURE: Primary | ICD-10-CM

## 2023-06-06 RX ORDER — LOSARTAN POTASSIUM 25 MG/1
12.5 TABLET ORAL DAILY
Qty: 45 TABLET | Refills: 1 | Status: CANCELLED | OUTPATIENT
Start: 2023-06-06

## 2023-06-06 NOTE — PROGRESS NOTES
Patient has chronic systolic heart failure, EF 25-30% on recent limited echo 6/2/2023, new order for cardiac rehab placed.  We will also place EP referral for consideration of ICD as he is on maximally tolerated medical therapy cannot tolerate Entresto or spironolactone due to hypotension.

## 2023-06-06 NOTE — TELEPHONE ENCOUNTER
Per RDS- Keep pt on lisinopril but see if he can tolerate to increase to 5 mg. He cannot tolerate Entresto due to hypotension. Message sent to pt on MyChart. New dose sent in.

## 2023-06-06 NOTE — PROGRESS NOTES
I contacted Devon to let him know the results of his echocardiogram.  His LVEF has improved to 30% that was previously 20% but he is still at high risk of sudden cardiac death.  He is on maximally tolerated guideline directed medical therapy which has been limited due to hypotension.  We will refer him to EP for evaluation of ICD.    BECK Guzman

## 2023-06-07 ENCOUNTER — TREATMENT (OUTPATIENT)
Dept: CARDIAC REHAB | Facility: HOSPITAL | Age: 43
End: 2023-06-07
Payer: COMMERCIAL

## 2023-06-07 DIAGNOSIS — I21.02 STEMI INVOLVING LEFT ANTERIOR DESCENDING CORONARY ARTERY: Primary | ICD-10-CM

## 2023-06-07 PROCEDURE — 93798 PHYS/QHP OP CAR RHAB W/ECG: CPT

## 2023-06-07 RX ORDER — LISINOPRIL 5 MG/1
5 TABLET ORAL DAILY
Qty: 90 TABLET | Refills: 3 | Status: SHIPPED | OUTPATIENT
Start: 2023-06-07

## 2023-06-09 ENCOUNTER — TREATMENT (OUTPATIENT)
Dept: CARDIAC REHAB | Facility: HOSPITAL | Age: 43
End: 2023-06-09
Payer: COMMERCIAL

## 2023-06-09 DIAGNOSIS — I21.02 STEMI INVOLVING LEFT ANTERIOR DESCENDING CORONARY ARTERY: Primary | ICD-10-CM

## 2023-06-09 PROCEDURE — 93798 PHYS/QHP OP CAR RHAB W/ECG: CPT

## 2023-06-12 ENCOUNTER — TREATMENT (OUTPATIENT)
Dept: CARDIAC REHAB | Facility: HOSPITAL | Age: 43
End: 2023-06-12
Payer: COMMERCIAL

## 2023-06-12 DIAGNOSIS — I21.02 STEMI INVOLVING LEFT ANTERIOR DESCENDING CORONARY ARTERY: Primary | ICD-10-CM

## 2023-06-12 PROCEDURE — 93798 PHYS/QHP OP CAR RHAB W/ECG: CPT

## 2023-06-19 RX ORDER — METOPROLOL SUCCINATE 25 MG/1
25 TABLET, EXTENDED RELEASE ORAL DAILY
Qty: 90 TABLET | Refills: 3 | Status: SHIPPED | OUTPATIENT
Start: 2023-06-19

## 2023-06-21 PROBLEM — F41.1 GENERALIZED ANXIETY DISORDER: Status: ACTIVE | Noted: 2023-06-21

## 2023-07-13 ENCOUNTER — TELEPHONE (OUTPATIENT)
Dept: CARDIOLOGY | Facility: CLINIC | Age: 43
End: 2023-07-13

## 2023-07-13 NOTE — TELEPHONE ENCOUNTER
Caller: HEMANTH     Relationship:  FROM [employer]    Best call back number: [phone]    What form or medical record are you requesting: OFFICE NOTE FROM 06/30/23    Who is requesting this form or medical record from you:  FROM [employer]     How would you like to receive the form or medical records (pick-up, mail, fax): FAX  If fax, what is the fax number: [phone]      Timeframe paperwork needed: AS SOON AS POSSIBLE     Additional notes:  FROM [employer] CALLED IN TO SEE IF THEY CAN RECEIVE THE OFFICE NOTE FROM 06/30/23 WHEN PATIENT SAW DR. BROOKS.

## 2023-07-25 ENCOUNTER — TREATMENT (OUTPATIENT)
Dept: CARDIAC REHAB | Facility: HOSPITAL | Age: 43
End: 2023-07-25
Payer: COMMERCIAL

## 2023-07-25 DIAGNOSIS — I50.22 CHRONIC SYSTOLIC HEART FAILURE: ICD-10-CM

## 2023-07-25 PROCEDURE — 93798 PHYS/QHP OP CAR RHAB W/ECG: CPT

## 2023-07-25 NOTE — PROGRESS NOTES
Attended Phase II cardiac rehab orientation. Medication and health history is  recorded. See [facility] for details.

## 2023-07-26 ENCOUNTER — TREATMENT (OUTPATIENT)
Dept: CARDIAC REHAB | Facility: HOSPITAL | Age: 43
End: 2023-07-26
Payer: COMMERCIAL

## 2023-07-26 DIAGNOSIS — I50.22 CHRONIC SYSTOLIC HEART FAILURE: Primary | ICD-10-CM

## 2023-07-26 PROCEDURE — 93798 PHYS/QHP OP CAR RHAB W/ECG: CPT

## 2023-07-28 ENCOUNTER — TREATMENT (OUTPATIENT)
Dept: CARDIAC REHAB | Facility: HOSPITAL | Age: 43
End: 2023-07-28
Payer: COMMERCIAL

## 2023-07-28 DIAGNOSIS — I50.22 CHRONIC SYSTOLIC HEART FAILURE: Primary | ICD-10-CM

## 2023-07-28 PROCEDURE — 93798 PHYS/QHP OP CAR RHAB W/ECG: CPT

## 2023-07-31 ENCOUNTER — TREATMENT (OUTPATIENT)
Dept: CARDIAC REHAB | Facility: HOSPITAL | Age: 43
End: 2023-07-31
Payer: COMMERCIAL

## 2023-07-31 DIAGNOSIS — I50.22 CHRONIC SYSTOLIC HEART FAILURE: Primary | ICD-10-CM

## 2023-07-31 PROCEDURE — 93798 PHYS/QHP OP CAR RHAB W/ECG: CPT

## 2023-08-02 ENCOUNTER — TREATMENT (OUTPATIENT)
Dept: CARDIAC REHAB | Facility: HOSPITAL | Age: 43
End: 2023-08-02
Payer: COMMERCIAL

## 2023-08-02 DIAGNOSIS — I21.02 STEMI INVOLVING LEFT ANTERIOR DESCENDING CORONARY ARTERY: Primary | ICD-10-CM

## 2023-08-02 PROCEDURE — 93798 PHYS/QHP OP CAR RHAB W/ECG: CPT

## 2023-08-03 RX ORDER — EMPAGLIFLOZIN 10 MG/1
TABLET, FILM COATED ORAL
Qty: 30 TABLET | Refills: 0 | OUTPATIENT
Start: 2023-08-03

## 2023-08-04 ENCOUNTER — TREATMENT (OUTPATIENT)
Dept: CARDIAC REHAB | Facility: HOSPITAL | Age: 43
End: 2023-08-04
Payer: COMMERCIAL

## 2023-08-04 DIAGNOSIS — I21.02 STEMI INVOLVING LEFT ANTERIOR DESCENDING CORONARY ARTERY: Primary | ICD-10-CM

## 2023-08-04 PROCEDURE — 93798 PHYS/QHP OP CAR RHAB W/ECG: CPT

## 2023-08-07 ENCOUNTER — TREATMENT (OUTPATIENT)
Dept: CARDIAC REHAB | Facility: HOSPITAL | Age: 43
End: 2023-08-07
Payer: COMMERCIAL

## 2023-08-07 DIAGNOSIS — I50.22 CHRONIC SYSTOLIC HEART FAILURE: Primary | ICD-10-CM

## 2023-08-07 PROCEDURE — 93798 PHYS/QHP OP CAR RHAB W/ECG: CPT

## 2023-08-09 ENCOUNTER — TREATMENT (OUTPATIENT)
Dept: CARDIAC REHAB | Facility: HOSPITAL | Age: 43
End: 2023-08-09
Payer: COMMERCIAL

## 2023-08-09 DIAGNOSIS — I50.22 CHRONIC SYSTOLIC HEART FAILURE: Primary | ICD-10-CM

## 2023-08-09 PROCEDURE — 93798 PHYS/QHP OP CAR RHAB W/ECG: CPT

## 2023-08-11 ENCOUNTER — TELEPHONE (OUTPATIENT)
Dept: CARDIAC REHAB | Facility: HOSPITAL | Age: 43
End: 2023-08-11
Payer: COMMERCIAL

## 2023-08-11 NOTE — TELEPHONE ENCOUNTER
Patient called to notify staff that he was having car trouble and was unable to attend his exercise session. Patient plans to return on Monday, 8/14/23.

## 2023-08-14 ENCOUNTER — TREATMENT (OUTPATIENT)
Dept: CARDIAC REHAB | Facility: HOSPITAL | Age: 43
End: 2023-08-14
Payer: COMMERCIAL

## 2023-08-14 DIAGNOSIS — I50.22 CHRONIC SYSTOLIC HEART FAILURE: Primary | ICD-10-CM

## 2023-08-14 PROCEDURE — 93798 PHYS/QHP OP CAR RHAB W/ECG: CPT

## 2023-08-16 ENCOUNTER — TREATMENT (OUTPATIENT)
Dept: CARDIAC REHAB | Facility: HOSPITAL | Age: 43
End: 2023-08-16
Payer: COMMERCIAL

## 2023-08-16 DIAGNOSIS — I50.22 CHRONIC SYSTOLIC HEART FAILURE: Primary | ICD-10-CM

## 2023-08-16 PROCEDURE — 93798 PHYS/QHP OP CAR RHAB W/ECG: CPT

## 2023-08-18 ENCOUNTER — TREATMENT (OUTPATIENT)
Dept: CARDIAC REHAB | Facility: HOSPITAL | Age: 43
End: 2023-08-18
Payer: COMMERCIAL

## 2023-08-18 DIAGNOSIS — I50.22 CHRONIC SYSTOLIC HEART FAILURE: Primary | ICD-10-CM

## 2023-08-18 PROCEDURE — 93798 PHYS/QHP OP CAR RHAB W/ECG: CPT

## 2023-08-21 ENCOUNTER — TREATMENT (OUTPATIENT)
Dept: CARDIAC REHAB | Facility: HOSPITAL | Age: 43
End: 2023-08-21
Payer: COMMERCIAL

## 2023-08-21 DIAGNOSIS — I50.22 CHRONIC SYSTOLIC HEART FAILURE: Primary | ICD-10-CM

## 2023-08-21 PROCEDURE — 93798 PHYS/QHP OP CAR RHAB W/ECG: CPT

## 2023-08-23 ENCOUNTER — TREATMENT (OUTPATIENT)
Dept: CARDIAC REHAB | Facility: HOSPITAL | Age: 43
End: 2023-08-23
Payer: COMMERCIAL

## 2023-08-23 DIAGNOSIS — I50.22 CHRONIC SYSTOLIC HEART FAILURE: Primary | ICD-10-CM

## 2023-08-23 PROCEDURE — 93798 PHYS/QHP OP CAR RHAB W/ECG: CPT

## 2023-08-25 ENCOUNTER — TREATMENT (OUTPATIENT)
Dept: CARDIAC REHAB | Facility: HOSPITAL | Age: 43
End: 2023-08-25
Payer: COMMERCIAL

## 2023-08-25 DIAGNOSIS — I50.22 CHRONIC SYSTOLIC HEART FAILURE: Primary | ICD-10-CM

## 2023-08-25 PROCEDURE — 93798 PHYS/QHP OP CAR RHAB W/ECG: CPT

## 2023-08-28 ENCOUNTER — TREATMENT (OUTPATIENT)
Dept: CARDIAC REHAB | Facility: HOSPITAL | Age: 43
End: 2023-08-28
Payer: COMMERCIAL

## 2023-08-28 DIAGNOSIS — I50.22 CHRONIC SYSTOLIC HEART FAILURE: Primary | ICD-10-CM

## 2023-08-28 PROCEDURE — 93798 PHYS/QHP OP CAR RHAB W/ECG: CPT

## 2023-08-30 ENCOUNTER — TREATMENT (OUTPATIENT)
Dept: CARDIAC REHAB | Facility: HOSPITAL | Age: 43
End: 2023-08-30
Payer: COMMERCIAL

## 2023-08-30 DIAGNOSIS — I50.22 CHRONIC SYSTOLIC HEART FAILURE: Primary | ICD-10-CM

## 2023-08-30 PROCEDURE — 93798 PHYS/QHP OP CAR RHAB W/ECG: CPT

## 2023-09-01 ENCOUNTER — TREATMENT (OUTPATIENT)
Dept: CARDIAC REHAB | Facility: HOSPITAL | Age: 43
End: 2023-09-01
Payer: COMMERCIAL

## 2023-09-01 DIAGNOSIS — I50.22 CHRONIC SYSTOLIC HEART FAILURE: Primary | ICD-10-CM

## 2023-09-01 PROCEDURE — 93798 PHYS/QHP OP CAR RHAB W/ECG: CPT

## 2023-09-06 ENCOUNTER — TREATMENT (OUTPATIENT)
Dept: CARDIAC REHAB | Facility: HOSPITAL | Age: 43
End: 2023-09-06
Payer: COMMERCIAL

## 2023-09-06 DIAGNOSIS — I50.22 CHRONIC SYSTOLIC HEART FAILURE: Primary | ICD-10-CM

## 2023-09-06 PROCEDURE — 93798 PHYS/QHP OP CAR RHAB W/ECG: CPT

## 2023-09-08 ENCOUNTER — TREATMENT (OUTPATIENT)
Dept: CARDIAC REHAB | Facility: HOSPITAL | Age: 43
End: 2023-09-08
Payer: COMMERCIAL

## 2023-09-08 DIAGNOSIS — I21.02 STEMI INVOLVING LEFT ANTERIOR DESCENDING CORONARY ARTERY: ICD-10-CM

## 2023-09-08 DIAGNOSIS — I50.22 CHRONIC SYSTOLIC HEART FAILURE: Primary | ICD-10-CM

## 2023-09-08 PROCEDURE — 93798 PHYS/QHP OP CAR RHAB W/ECG: CPT

## 2023-09-11 ENCOUNTER — TREATMENT (OUTPATIENT)
Dept: CARDIAC REHAB | Facility: HOSPITAL | Age: 43
End: 2023-09-11
Payer: COMMERCIAL

## 2023-09-11 DIAGNOSIS — I50.22 CHRONIC SYSTOLIC HEART FAILURE: Primary | ICD-10-CM

## 2023-09-11 PROCEDURE — 93798 PHYS/QHP OP CAR RHAB W/ECG: CPT

## 2023-09-11 RX ORDER — VALACYCLOVIR HYDROCHLORIDE 1 G/1
1000 TABLET, FILM COATED ORAL 2 TIMES DAILY
Qty: 60 TABLET | Refills: 0 | Status: SHIPPED | OUTPATIENT
Start: 2023-09-11

## 2023-09-13 ENCOUNTER — TREATMENT (OUTPATIENT)
Dept: CARDIAC REHAB | Facility: HOSPITAL | Age: 43
End: 2023-09-13
Payer: COMMERCIAL

## 2023-09-13 DIAGNOSIS — I50.22 CHRONIC SYSTOLIC HEART FAILURE: Primary | ICD-10-CM

## 2023-09-13 PROCEDURE — 93798 PHYS/QHP OP CAR RHAB W/ECG: CPT

## 2023-09-15 ENCOUNTER — TREATMENT (OUTPATIENT)
Dept: CARDIAC REHAB | Facility: HOSPITAL | Age: 43
End: 2023-09-15
Payer: COMMERCIAL

## 2023-09-15 DIAGNOSIS — I50.22 CHRONIC SYSTOLIC HEART FAILURE: Primary | ICD-10-CM

## 2023-09-15 PROCEDURE — 93798 PHYS/QHP OP CAR RHAB W/ECG: CPT

## 2023-09-25 ENCOUNTER — TREATMENT (OUTPATIENT)
Dept: CARDIAC REHAB | Facility: HOSPITAL | Age: 43
End: 2023-09-25

## 2023-09-25 DIAGNOSIS — I50.22 CHRONIC SYSTOLIC HEART FAILURE: Primary | ICD-10-CM

## 2023-09-25 PROCEDURE — 93798 PHYS/QHP OP CAR RHAB W/ECG: CPT

## 2023-09-27 ENCOUNTER — TREATMENT (OUTPATIENT)
Dept: CARDIAC REHAB | Facility: HOSPITAL | Age: 43
End: 2023-09-27
Payer: COMMERCIAL

## 2023-09-27 DIAGNOSIS — I50.22 CHRONIC SYSTOLIC HEART FAILURE: Primary | ICD-10-CM

## 2023-09-27 PROCEDURE — 93798 PHYS/QHP OP CAR RHAB W/ECG: CPT

## 2023-09-27 NOTE — PROGRESS NOTES
Attended Phase II Cardiac Rehab. No medication or health history changes reported. See [facility] for details.   ------------ATTENDING NOTE------------   pt c/o waking up w/ severe diffuse tension type headache, felt nauseated, diffuse muscle aches, resolved slower over day, talked with her physician who sent her to ED, nml neuro exam (symm facies, AOx3, nml speech, CN grossly intact, nml strength/sensation, nml gait, (-)ataxia/Romberg), no meningismus, clear chest w/o distress, nml cardiac exam, imaging/labs wnl, benign repeat exams, in depth dw all about ddx, tx, broussard, continued close outpt fu.  - Larry Severino MD   ---------------------------------------------

## 2023-09-29 ENCOUNTER — TREATMENT (OUTPATIENT)
Dept: CARDIAC REHAB | Facility: HOSPITAL | Age: 43
End: 2023-09-29
Payer: COMMERCIAL

## 2023-09-29 DIAGNOSIS — I50.22 CHRONIC SYSTOLIC HEART FAILURE: Primary | ICD-10-CM

## 2023-09-29 PROCEDURE — 93798 PHYS/QHP OP CAR RHAB W/ECG: CPT

## 2023-10-02 ENCOUNTER — TREATMENT (OUTPATIENT)
Dept: CARDIAC REHAB | Facility: HOSPITAL | Age: 43
End: 2023-10-02
Payer: COMMERCIAL

## 2023-10-02 DIAGNOSIS — I50.22 CHRONIC SYSTOLIC HEART FAILURE: Primary | ICD-10-CM

## 2023-10-02 PROCEDURE — 93798 PHYS/QHP OP CAR RHAB W/ECG: CPT

## 2023-10-04 ENCOUNTER — TREATMENT (OUTPATIENT)
Dept: CARDIAC REHAB | Facility: HOSPITAL | Age: 43
End: 2023-10-04
Payer: COMMERCIAL

## 2023-10-04 DIAGNOSIS — I50.22 CHRONIC SYSTOLIC HEART FAILURE: Primary | ICD-10-CM

## 2023-10-04 PROCEDURE — 93798 PHYS/QHP OP CAR RHAB W/ECG: CPT

## 2023-10-06 ENCOUNTER — TREATMENT (OUTPATIENT)
Dept: CARDIAC REHAB | Facility: HOSPITAL | Age: 43
End: 2023-10-06
Payer: COMMERCIAL

## 2023-10-06 ENCOUNTER — HOSPITAL ENCOUNTER (OUTPATIENT)
Dept: CARDIOLOGY | Facility: HOSPITAL | Age: 43
Discharge: HOME | End: 2023-10-06
Payer: COMMERCIAL

## 2023-10-06 VITALS — BODY MASS INDEX: 24.16 KG/M2 | WEIGHT: 182.32 LBS | HEIGHT: 73 IN

## 2023-10-06 DIAGNOSIS — I50.22 CHRONIC SYSTOLIC HEART FAILURE: Primary | ICD-10-CM

## 2023-10-06 DIAGNOSIS — I25.5 ISCHEMIC CARDIOMYOPATHY: ICD-10-CM

## 2023-10-06 LAB
BH CV ECHO MEAS - AO MAX PG: 4 MMHG
BH CV ECHO MEAS - AO MEAN PG: 2 MMHG
BH CV ECHO MEAS - AO ROOT DIAM: 3.3 CM
BH CV ECHO MEAS - AO V2 MAX: 99.8 CM/SEC
BH CV ECHO MEAS - AO V2 VTI: 25.5 CM
BH CV ECHO MEAS - AVA(I,D): 2.25 CM2
BH CV ECHO MEAS - EDV(CUBED): 195.1 ML
BH CV ECHO MEAS - EDV(MOD-SP2): 192 ML
BH CV ECHO MEAS - EDV(MOD-SP4): 179 ML
BH CV ECHO MEAS - EF(MOD-BP): 33.5 %
BH CV ECHO MEAS - EF(MOD-SP2): 27.1 %
BH CV ECHO MEAS - EF(MOD-SP4): 37.4 %
BH CV ECHO MEAS - ESV(CUBED): 64 ML
BH CV ECHO MEAS - ESV(MOD-SP2): 140 ML
BH CV ECHO MEAS - ESV(MOD-SP4): 112 ML
BH CV ECHO MEAS - FS: 31 %
BH CV ECHO MEAS - IVS/LVPW: 1 CM
BH CV ECHO MEAS - IVSD: 0.9 CM
BH CV ECHO MEAS - LA DIMENSION: 4.2 CM
BH CV ECHO MEAS - LAT PEAK E' VEL: 7.8 CM/SEC
BH CV ECHO MEAS - LV DIASTOLIC VOL/BSA (35-75): 86.6 CM2
BH CV ECHO MEAS - LV MASS(C)D: 203.5 GRAMS
BH CV ECHO MEAS - LV MAX PG: 2.03 MMHG
BH CV ECHO MEAS - LV MEAN PG: 1 MMHG
BH CV ECHO MEAS - LV SYSTOLIC VOL/BSA (12-30): 54.2 CM2
BH CV ECHO MEAS - LV V1 MAX: 71.3 CM/SEC
BH CV ECHO MEAS - LV V1 VTI: 18.3 CM
BH CV ECHO MEAS - LVIDD: 5.8 CM
BH CV ECHO MEAS - LVIDS: 4 CM
BH CV ECHO MEAS - LVOT AREA: 3.1 CM2
BH CV ECHO MEAS - LVOT DIAM: 2 CM
BH CV ECHO MEAS - LVPWD: 0.9 CM
BH CV ECHO MEAS - MED PEAK E' VEL: 6.8 CM/SEC
BH CV ECHO MEAS - MV A MAX VEL: 76 CM/SEC
BH CV ECHO MEAS - MV DEC SLOPE: 280 CM/SEC2
BH CV ECHO MEAS - MV DEC TIME: 0.19 SEC
BH CV ECHO MEAS - MV E MAX VEL: 104 CM/SEC
BH CV ECHO MEAS - MV E/A: 1.37
BH CV ECHO MEAS - MV MAX PG: 4.1 MMHG
BH CV ECHO MEAS - MV MEAN PG: 2 MMHG
BH CV ECHO MEAS - MV P1/2T: 106.7 MSEC
BH CV ECHO MEAS - MV V2 VTI: 41 CM
BH CV ECHO MEAS - MVA(P1/2T): 2.06 CM2
BH CV ECHO MEAS - MVA(VTI): 1.4 CM2
BH CV ECHO MEAS - PA ACC TIME: 0.15 SEC
BH CV ECHO MEAS - RAP SYSTOLE: 3 MMHG
BH CV ECHO MEAS - RVSP: 8 MMHG
BH CV ECHO MEAS - SI(MOD-SP2): 25.2 ML/M2
BH CV ECHO MEAS - SI(MOD-SP4): 32.4 ML/M2
BH CV ECHO MEAS - SV(LVOT): 57.5 ML
BH CV ECHO MEAS - SV(MOD-SP2): 52 ML
BH CV ECHO MEAS - SV(MOD-SP4): 67 ML
BH CV ECHO MEAS - TAPSE (>1.6): 2.09 CM
BH CV ECHO MEAS - TR MAX PG: 4.9 MMHG
BH CV ECHO MEAS - TR MAX VEL: 111 CM/SEC
BH CV ECHO MEASUREMENTS AVERAGE E/E' RATIO: 14.25
BH CV VAS BP RIGHT ARM: NORMAL MMHG
BH CV XLRA - RV BASE: 3.2 CM
BH CV XLRA - RV LENGTH: 8.3 CM
BH CV XLRA - RV MID: 2.3 CM
BH CV XLRA - TDI S': 10.8 CM/SEC
LEFT ATRIUM VOLUME INDEX: 30.7 ML/M2

## 2023-10-06 PROCEDURE — 93798 PHYS/QHP OP CAR RHAB W/ECG: CPT

## 2023-10-06 PROCEDURE — 93306 TTE W/DOPPLER COMPLETE: CPT

## 2023-10-09 ENCOUNTER — TREATMENT (OUTPATIENT)
Dept: CARDIAC REHAB | Facility: HOSPITAL | Age: 43
End: 2023-10-09
Payer: COMMERCIAL

## 2023-10-09 DIAGNOSIS — I50.22 CHRONIC SYSTOLIC HEART FAILURE: Primary | ICD-10-CM

## 2023-10-09 PROCEDURE — 93798 PHYS/QHP OP CAR RHAB W/ECG: CPT

## 2023-10-11 ENCOUNTER — TREATMENT (OUTPATIENT)
Dept: CARDIAC REHAB | Facility: HOSPITAL | Age: 43
End: 2023-10-11
Payer: COMMERCIAL

## 2023-10-11 DIAGNOSIS — I50.22 CHRONIC SYSTOLIC HEART FAILURE: Primary | ICD-10-CM

## 2023-10-11 PROCEDURE — 93798 PHYS/QHP OP CAR RHAB W/ECG: CPT

## 2023-10-11 NOTE — PROGRESS NOTES
"Attended Phase II Cardiac Rehab. Patient reports that he was awakened last night with a \"chest soreness\". Patient ranked pain a 1.5/10 and denied radiation of pain or shortness of air. He states he just doesn't feel right today. Patient is counseled to not push himself today to meet or beat levels. Instructed patient to pay attention to his symptoms and if they increase in frequency or intensity, seek medical attention. Patient did decrease some levels on his own today.See [facility] for details.  "

## 2023-10-11 NOTE — PROGRESS NOTES
OFFICE VISIT  NOTE  [facility] CARDIOLOGY      Name: Devon Smith    Date: 10/12/2023  MRN:  [id number]  :  1980      REFERRING/PRIMARY PROVIDER:  Galo Borges MD     Chief Complaint   Patient presents with    Follow-up       HPI: Devon Smith is a 43 y.o. male who presents today for follow up for ICM, anterior STEMI 2023. History of hyperlipidemia, presented with substernal chest pain/pressure with radiation to bilateral arms and jaws. Emergent cath 3/1/23 with KAVON to 100% occluded LAD.  Echo showed EF less than 20%. Discharged with Lifevest in place, intolerant to Entresto due to hypotension, but he is on Toprol, Jardiance and Lisinopril 5mg. Participating in cardiac rehab 3 times a week, and denies any chest pain or unusual dyspnea while exercising on treadmill or bike. Repeat echo 2023 with EF 26-30%, he was referred to EP for ICD Implant, however he wanted to wait a little longer and repeat another echo. This showed EF 31-35% on 10/6/23.     Overall doing well participating in cardiac rehab some slight chest soreness but otherwise no significant chest pain.  He also had some back pain so it may be musculoskeletal.  Denies similar symptoms to what he had prior to stemi.     ROS:Pertinent positives as listed in the HPI.  All other systems reviewed and negative.    Past Medical History:   Diagnosis Date    Allergic 76    Hyperlipidemia 2022    MRSA cellulitis     Myocardial infarction 3/1/2023       Past Surgical History:   Procedure Laterality Date    CARDIAC CATHETERIZATION N/A 2023    Procedure: Left Heart Cath;  Surgeon: Arsenio Ramos MD;  Location:  [facility] CATH INVASIVE LOCATION;  Service: Cardiovascular;  Laterality: N/A;    CARDIAC CATHETERIZATION N/A 2023    Procedure: Optical Coherent Tomography;  Surgeon: Arsenio Ramos MD;  Location:  [facility] CATH INVASIVE LOCATION;  Service: Cardiovascular;  Laterality: N/A;    CARDIAC  "CATHETERIZATION N/A 03/01/2023    Procedure: Percutaneous Coronary Intervention;  Surgeon: Asrenio Ramos MD;  Location: [facility] CATH INVASIVE LOCATION;  Service: Cardiovascular;  Laterality: N/A;    CORONARY STENT PLACEMENT  3/1/2023    INCISION AND DRAINAGE ABSCESS  2013       Social History     Socioeconomic History    Marital status:    Tobacco Use    Smoking status: Never     Passive exposure: Never    Smokeless tobacco: Never   Vaping Use    Vaping Use: Never used   Substance and Sexual Activity    Alcohol use: Never    Drug use: Never    Sexual activity: Not Currently     Partners: Female       Family History   Problem Relation Age of Onset    Migraine headaches Mother     Heart attack Father 67        CABG    Heart disease Maternal Grandmother         heart failure    Diabetes Maternal Grandmother     Heart attack Paternal Grandfather         CAD    Colon cancer Neg Hx     Prostate cancer Neg Hx         No Known Allergies    Current Outpatient Medications   Medication Instructions    aspirin 81 mg, Oral, Daily    clotrimazole (LOTRIMIN) 1 % cream 1 application , Topical, As Needed    empagliflozin (JARDIANCE) 10 mg, Oral, Daily    escitalopram (LEXAPRO) 10 mg, Oral, Daily, Further refills from PCP    hydrOXYzine (ATARAX) 25 mg, Oral, Every 8 Hours PRN    lisinopril (PRINIVIL,ZESTRIL) 5 mg, Oral, Daily    metoprolol succinate XL (TOPROL-XL) 25 mg, Oral, Daily    nitroglycerin (NITROSTAT) 0.4 mg, Sublingual, Every 5 Minutes PRN, Take no more than 3 doses in 15 minutes.    rosuvastatin (CRESTOR) 40 mg, Oral, Nightly    ticagrelor (BRILINTA) 90 mg, Oral, 2 Times Daily    valACYclovir (VALTREX) 1,000 mg, Oral, 2 Times Daily       Vitals:    10/12/23 1113   BP: 98/68   BP Location: Right arm   Patient Position: Sitting   Pulse: 67   SpO2: 98%   Weight: 83 kg (183 lb)   Height: 185.4 cm (73\")     Body mass index is 24.14 kg/mý.    PHYSICAL EXAM:    General Appearance:   well developed  well " "nourished  Neck:  thyroid not enlarged  supple  Respiratory:  no respiratory distress  normal breath sounds  no rales  Cardiovascular:  no jugular venous distention  regular rhythm  apical impulse normal  S1 normal, S2 normal  no S3, no S4   no murmur  no rub, no thrill  carotid pulses normal; no bruit  pedal pulses normal  lower extremity edema: none    Skin:   warm, dry    RESULTS:   Procedures    Results for orders placed during the hospital encounter of 10/06/23    Adult Transthoracic Echo Complete W/ Cont if Necessary Per Protocol    Interpretation Summary    Left ventricular systolic function is moderately decreased. Calculated left ventricular EF = 33.5% Left ventricular ejection fraction appears to be 31 - 35%.    The left ventricular cavity is mildly dilated.    The following left ventricular wall segments are akinetic: apical anterior, apical lateral, apical inferior, apical septal and apex.    Left ventricular diastolic function is consistent with (grade II w/high LAP) pseudonormalization.    Estimated right ventricular systolic pressure from tricuspid regurgitation is normal (<35 mmHg). Calculated right ventricular systolic pressure from tricuspid regurgitation is 8 mmHg.    Improved EF compared to 3/2023 echo        Labs:  Lab Results   Component Value Date    CHOL 108 04/17/2023    TRIG 67 04/17/2023    HDL 44 04/17/2023    LDL 50 04/17/2023    AST 21 04/17/2023    ALT 23 04/17/2023     Lab Results   Component Value Date    HGBA1C 5.70 (H) 03/02/2023     Creatinine   Date Value Ref Range Status   04/17/2023 1.07 0.76 - 1.27 mg/dL Final   03/03/2023 0.91 0.76 - 1.27 mg/dL Final   03/02/2023 0.87 0.76 - 1.27 mg/dL Final   03/01/2023 0.90 0.60 - 1.30 mg/dL Final     Comment:     Serial Number: [id number]:  [id number]     No results found for: \"EGFRIFNONA\"      ASSESSMENT:  Problem List Items Addressed This Visit          Cardiac and Vasculature    STEMI involving left anterior descending coronary artery - " Primary    Hyperlipidemia LDL goal <70    Essential hypertension    Coronary artery disease involving native coronary artery of native heart without angina pectoris    Overview     3/1/2023: Anterior STEMI, mid LAD 10 percent thrombotic occlusion status post OCT guided PCI with a 4.0 x 38 mm Xience KAVON, proximal LAD 30-40%, normal circumflex and RCA, LVEF 25-30% due to anterior apical and distal inferior akinesis, EDP 36 mmHg         Acute systolic heart failure    Overview     Echo 3/2/23: EF <20%, hypokinetic wall segments, grade II diastolic dysfunction, no significant valvular disease, no LV thrombus            PLAN:  1.  CAD with anterior STEMI 3/2023:  [facility] 3/1 with 100% mid LAD thrombotic occlusion s/p KAVON, normal RCA and LCx  Continue ASA, Brilinta, BB, statin   Active in cardiac rehab without any angina or anginal equivalent     2.  Ischemic cardiomyopathy/ Systolic heart failure   Could not tolerate Entresto due to hypotension  EF initially <20%, now 31-35% by echo 10/6/23, can discuss with EP whether he wants ICD or not.   Blood pressure too low for MRA  On Jardiance, Toprol and Lisinopril, continue all fo now.   Lifevest in place  Referred to Dr. Styles for ICD discussion     3.  Mixed hyperlipidemia:   prior to MI  LDL improved to 50, continue Crestor 40mg   Repeat labs      Follow-up   Return in about 9 months (around 7/12/2024).    Arsenio Ramos MD, [facility], [facility]  Interventional Cardiology  10/12/23  11:48 EDT

## 2023-10-12 ENCOUNTER — OFFICE VISIT (OUTPATIENT)
Dept: CARDIOLOGY | Facility: CLINIC | Age: 43
End: 2023-10-12
Payer: COMMERCIAL

## 2023-10-12 VITALS
SYSTOLIC BLOOD PRESSURE: 98 MMHG | HEIGHT: 73 IN | BODY MASS INDEX: 24.25 KG/M2 | HEART RATE: 67 BPM | OXYGEN SATURATION: 98 % | WEIGHT: 183 LBS | DIASTOLIC BLOOD PRESSURE: 68 MMHG

## 2023-10-12 DIAGNOSIS — I25.10 CORONARY ARTERY DISEASE INVOLVING NATIVE CORONARY ARTERY OF NATIVE HEART WITHOUT ANGINA PECTORIS: ICD-10-CM

## 2023-10-12 DIAGNOSIS — I10 ESSENTIAL HYPERTENSION: ICD-10-CM

## 2023-10-12 DIAGNOSIS — E78.5 HYPERLIPIDEMIA LDL GOAL <70: ICD-10-CM

## 2023-10-12 DIAGNOSIS — I21.02 STEMI INVOLVING LEFT ANTERIOR DESCENDING CORONARY ARTERY: Primary | ICD-10-CM

## 2023-10-12 DIAGNOSIS — I50.21 ACUTE SYSTOLIC HEART FAILURE: ICD-10-CM

## 2023-10-12 RX ORDER — CLOTRIMAZOLE 1 %
1 CREAM (GRAM) TOPICAL AS NEEDED
COMMUNITY
Start: 2023-07-13

## 2023-10-13 ENCOUNTER — TREATMENT (OUTPATIENT)
Dept: CARDIAC REHAB | Facility: HOSPITAL | Age: 43
End: 2023-10-13
Payer: COMMERCIAL

## 2023-10-13 DIAGNOSIS — I50.22 CHRONIC SYSTOLIC HEART FAILURE: Primary | ICD-10-CM

## 2023-10-13 PROCEDURE — 93798 PHYS/QHP OP CAR RHAB W/ECG: CPT

## 2023-10-16 ENCOUNTER — TREATMENT (OUTPATIENT)
Dept: CARDIAC REHAB | Facility: HOSPITAL | Age: 43
End: 2023-10-16
Payer: COMMERCIAL

## 2023-10-16 DIAGNOSIS — I50.22 CHRONIC SYSTOLIC HEART FAILURE: Primary | ICD-10-CM

## 2023-10-16 PROCEDURE — 93798 PHYS/QHP OP CAR RHAB W/ECG: CPT

## 2023-10-18 ENCOUNTER — TREATMENT (OUTPATIENT)
Dept: CARDIAC REHAB | Facility: HOSPITAL | Age: 43
End: 2023-10-18
Payer: COMMERCIAL

## 2023-10-18 DIAGNOSIS — I50.22 CHRONIC SYSTOLIC HEART FAILURE: Primary | ICD-10-CM

## 2023-10-18 PROCEDURE — 93798 PHYS/QHP OP CAR RHAB W/ECG: CPT

## 2023-10-20 ENCOUNTER — TREATMENT (OUTPATIENT)
Dept: CARDIAC REHAB | Facility: HOSPITAL | Age: 43
End: 2023-10-20
Payer: COMMERCIAL

## 2023-10-20 DIAGNOSIS — I50.22 CHRONIC SYSTOLIC HEART FAILURE: Primary | ICD-10-CM

## 2023-10-20 PROCEDURE — 93798 PHYS/QHP OP CAR RHAB W/ECG: CPT

## 2023-10-23 ENCOUNTER — TREATMENT (OUTPATIENT)
Dept: CARDIAC REHAB | Facility: HOSPITAL | Age: 43
End: 2023-10-23
Payer: COMMERCIAL

## 2023-10-23 DIAGNOSIS — I50.22 CHRONIC SYSTOLIC HEART FAILURE: Primary | ICD-10-CM

## 2023-10-23 PROCEDURE — 93798 PHYS/QHP OP CAR RHAB W/ECG: CPT

## 2023-10-25 ENCOUNTER — TREATMENT (OUTPATIENT)
Dept: CARDIAC REHAB | Facility: HOSPITAL | Age: 43
End: 2023-10-25
Payer: COMMERCIAL

## 2023-10-25 DIAGNOSIS — I50.22 CHRONIC SYSTOLIC HEART FAILURE: Primary | ICD-10-CM

## 2023-10-25 PROCEDURE — 93798 PHYS/QHP OP CAR RHAB W/ECG: CPT

## 2023-10-27 ENCOUNTER — APPOINTMENT (OUTPATIENT)
Dept: CARDIAC REHAB | Facility: HOSPITAL | Age: 43
End: 2023-10-27
Payer: COMMERCIAL

## 2023-10-30 ENCOUNTER — APPOINTMENT (OUTPATIENT)
Dept: CARDIAC REHAB | Facility: HOSPITAL | Age: 43
End: 2023-10-30
Payer: COMMERCIAL

## 2023-12-10 DIAGNOSIS — Z23 ENCOUNTER FOR VACCINATION: ICD-10-CM

## 2023-12-11 RX ORDER — ESCITALOPRAM OXALATE 10 MG/1
10 TABLET ORAL DAILY
Qty: 90 TABLET | Refills: 0 | Status: SHIPPED | OUTPATIENT
Start: 2023-12-11 | End: 2023-12-14 | Stop reason: SDUPTHER

## 2023-12-14 ENCOUNTER — OFFICE VISIT (OUTPATIENT)
Dept: INTERNAL MEDICINE | Facility: CLINIC | Age: 43
End: 2023-12-14
Payer: COMMERCIAL

## 2023-12-14 VITALS
TEMPERATURE: 98.2 F | HEART RATE: 80 BPM | DIASTOLIC BLOOD PRESSURE: 70 MMHG | RESPIRATION RATE: 20 BRPM | SYSTOLIC BLOOD PRESSURE: 110 MMHG | BODY MASS INDEX: 24.99 KG/M2 | WEIGHT: 189.38 LBS

## 2023-12-14 DIAGNOSIS — Z23 ENCOUNTER FOR VACCINATION: ICD-10-CM

## 2023-12-14 DIAGNOSIS — F41.1 GENERALIZED ANXIETY DISORDER: Primary | ICD-10-CM

## 2023-12-14 DIAGNOSIS — I25.10 CORONARY ARTERY DISEASE INVOLVING NATIVE CORONARY ARTERY OF NATIVE HEART WITHOUT ANGINA PECTORIS: ICD-10-CM

## 2023-12-14 PROCEDURE — 99214 OFFICE O/P EST MOD 30 MIN: CPT | Performed by: STUDENT IN AN ORGANIZED HEALTH CARE EDUCATION/TRAINING PROGRAM

## 2023-12-14 RX ORDER — ESCITALOPRAM OXALATE 10 MG/1
10 TABLET ORAL DAILY
Qty: 90 TABLET | Refills: 3 | Status: SHIPPED | OUTPATIENT
Start: 2023-12-14

## 2023-12-14 NOTE — PROGRESS NOTES
Follow Up Office Visit      Date: 2023   Patient Name: Devon Smith  : 1980   MRN: [id number]     Chief Complaint:    Chief Complaint   Patient presents with    Follow-up     6 month anxiety        History of Present Illness: Devon Smith is a 43 y.o. male who is here today to follow up with the followign problems.    HPI    Anxiety  Lexapro 10mg daily (started last visit 23)  Atarax 25mg q8h PRN  His Lexapro 10 mg has been life changing for him. It is not necessarily related to the heart attack, but just with general anxiety. He does not feel the dread anymore just walking into rooms to people. He denies any panic attacks or side effects from the medication. He denies any diarrhea, upset stomach, change in libido or sexual drive. He has had anxiety his entire life.     Medical history  He was having difficulty with hypotension and the only way he found to regulate it is to eat something salty. He used to have really bad allergies, but now they are nonexistent. He typically has allergies 2 to 3 times a year. He is not taking hydroxyzine regularly. His cardiologist had scheduled him for a blood draw, but he never went to get it. He denies nay leg swelling.     Immunization  He is due for an influenza vaccine.     History of STEMI 2023 (status post KAVON to mid LAD)  -I personally reviewed note by Arsenio Ramos MD of cardiology dated 10/12/2023.  Repeat echo 10/6/2023 showed EF of 31 to 35%.  Participating in cardiac rehab.  Recommended continuing aspirin, Brilinta, beta-blocker and statin.  Recommended discussion with electrophysiology regarding ICD placement.  Referred to Dr. Styles, has appointment 3/26/24.       Subjective      Review of Systems:   Review of Systems    I have reviewed the patients family history, social history, past medical history, past surgical history and have updated it as appropriate.     Medications:     Current Outpatient Medications:     aspirin  81 MG EC tablet, Take 1 tablet by mouth Daily., Disp: 90 tablet, Rfl: 3    clotrimazole (LOTRIMIN) 1 % cream, Apply 1 application  topically to the appropriate area as directed As Needed., Disp: , Rfl:     empagliflozin (Jardiance) 10 MG tablet tablet, Take 1 tablet by mouth Daily., Disp: 90 tablet, Rfl: 3    escitalopram (LEXAPRO) 10 MG tablet, Take 1 tablet by mouth once daily, Disp: 90 tablet, Rfl: 0    hydrOXYzine (ATARAX) 25 MG tablet, Take 1 tablet by mouth Every 8 (Eight) Hours As Needed for Itching or Anxiety., Disp: 30 tablet, Rfl: 3    lisinopril (PRINIVIL,ZESTRIL) 5 MG tablet, Take 1 tablet by mouth Daily., Disp: 90 tablet, Rfl: 3    metoprolol succinate XL (TOPROL-XL) 25 MG 24 hr tablet, Take 1 tablet by mouth Daily., Disp: 90 tablet, Rfl: 3    nitroglycerin (NITROSTAT) 0.4 MG SL tablet, Place 1 tablet under the tongue Every 5 (Five) Minutes As Needed for Chest Pain. Take no more than 3 doses in 15 minutes., Disp: 25 tablet, Rfl: 12    rosuvastatin (CRESTOR) 40 MG tablet, Take 1 tablet by mouth Every Night., Disp: 90 tablet, Rfl: 3    ticagrelor (BRILINTA) 90 MG tablet tablet, Take 1 tablet by mouth 2 (Two) Times a Day., Disp: 60 tablet, Rfl: 11    valACYclovir (VALTREX) 1000 MG tablet, Take 1 tablet by mouth 2 (Two) Times a Day., Disp: 60 tablet, Rfl: 0    Allergies:   No Known Allergies    Objective     Physical Exam: Please see above  Vital Signs:   Vitals:    12/14/23 0820   BP: 110/70   BP Location: Left arm   Patient Position: Sitting   Cuff Size: Adult   Pulse: 80   Resp: 20   Temp: 98.2 °F (36.8 °C)   TempSrc: Temporal   Weight: 85.9 kg (189 lb 6 oz)   PainSc: 0-No pain     Body mass index is 24.99 kg/m².  BMI is within normal parameters. No other follow-up for BMI required.       Physical Exam  Vitals reviewed.   Constitutional:       General: He is not in acute distress.     Appearance: Normal appearance. He is normal weight. He is not ill-appearing or toxic-appearing.   HENT:      Head:  Normocephalic and atraumatic.      Right Ear: External ear normal.      Left Ear: External ear normal.      Nose: Nose normal.      Mouth/Throat:      Mouth: Mucous membranes are moist.   Eyes:      General: No scleral icterus.        Right eye: No discharge.         Left eye: No discharge.      Extraocular Movements: Extraocular movements intact.      Pupils: Pupils are equal, round, and reactive to light.   Cardiovascular:      Rate and Rhythm: Normal rate and regular rhythm.      Pulses: Normal pulses.      Heart sounds: Normal heart sounds. No murmur heard.     No friction rub. No gallop.   Pulmonary:      Effort: Pulmonary effort is normal. No respiratory distress.      Breath sounds: Normal breath sounds. No stridor. No wheezing, rhonchi or rales.   Abdominal:      General: Abdomen is flat. There is no distension.      Palpations: Abdomen is soft.   Musculoskeletal:         General: No swelling or deformity. Normal range of motion.      Cervical back: Normal range of motion. No rigidity.   Skin:     General: Skin is warm and dry.      Capillary Refill: Capillary refill takes less than 2 seconds.      Coloration: Skin is not jaundiced or pale.   Neurological:      General: No focal deficit present.      Mental Status: He is alert and oriented to person, place, and time. Mental status is at baseline.   Psychiatric:         Mood and Affect: Mood normal.         Behavior: Behavior normal.         Judgment: Judgment normal.         Procedures    Results:   Labs:   Hemoglobin A1C   Date Value Ref Range Status   03/02/2023 5.70 (H) 4.80 - 5.60 % Final     TSH   Date Value Ref Range Status   03/01/2023 3.340 0.270 - 4.200 uIU/mL Final        Imaging:   No valid procedures specified.     Assessment / Plan      Assessment/Plan:   Problem List Items Addressed This Visit       Coronary artery disease involving native coronary artery of native heart without angina pectoris    Overview     3/1/2023: Anterior STEMI, mid LAD  10 percent thrombotic occlusion status post OCT guided PCI with a 4.0 x 38 mm Xience KAVON, proximal LAD 30-40%, normal circumflex and RCA, LVEF 25-30% due to anterior apical and distal inferior akinesis, EDP 36 mmHg         Current Assessment & Plan     Coronary artery disease is improving with treatment.  Continue current treatment regimen.  Encouraged follow up with EP for evaluation for ICD.  Cardiac status will be reassessed in 6 months.         Generalized anxiety disorder - Primary    Current Assessment & Plan     Psychological condition is improving with treatment.  Continue current treatment regimen.  Psychological condition  will be reassessed at the next regular appointment.         Relevant Medications    escitalopram (LEXAPRO) 10 MG tablet     Other Visit Diagnoses       Encounter for vaccination              Patient prefers to wait and get influenza vaccination at pharmacy.    Follow Up:   Return in about 6 months (around 6/14/2024) for Annual.      Galo Borges MD   [facility]  Transcribed from ambient dictation for Galo Borges MD by Kym Juarez.  12/14/23   09:14 EST    Patient or patient representative verbalized consent to the visit recording.  I have personally performed the services described in this document as transcribed by the above individual, and it is both accurate and complete.

## 2023-12-14 NOTE — ASSESSMENT & PLAN NOTE
Coronary artery disease is improving with treatment.  Continue current treatment regimen.  Encouraged follow up with EP for evaluation for ICD.  Cardiac status will be reassessed in 6 months.

## 2024-01-23 ENCOUNTER — OFFICE VISIT (OUTPATIENT)
Dept: INTERNAL MEDICINE | Facility: CLINIC | Age: 44
End: 2024-01-23
Payer: COMMERCIAL

## 2024-01-23 VITALS
TEMPERATURE: 98 F | HEART RATE: 64 BPM | BODY MASS INDEX: 24.94 KG/M2 | SYSTOLIC BLOOD PRESSURE: 124 MMHG | RESPIRATION RATE: 20 BRPM | WEIGHT: 189 LBS | DIASTOLIC BLOOD PRESSURE: 78 MMHG

## 2024-01-23 DIAGNOSIS — R25.2 CRAMP AND SPASM: ICD-10-CM

## 2024-01-23 DIAGNOSIS — Z23 ENCOUNTER FOR VACCINATION: ICD-10-CM

## 2024-01-23 DIAGNOSIS — M79.605 LEFT LEG PAIN: Primary | ICD-10-CM

## 2024-01-23 PROCEDURE — 99213 OFFICE O/P EST LOW 20 MIN: CPT | Performed by: STUDENT IN AN ORGANIZED HEALTH CARE EDUCATION/TRAINING PROGRAM

## 2024-01-23 PROCEDURE — 90471 IMMUNIZATION ADMIN: CPT | Performed by: STUDENT IN AN ORGANIZED HEALTH CARE EDUCATION/TRAINING PROGRAM

## 2024-01-23 PROCEDURE — 90686 IIV4 VACC NO PRSV 0.5 ML IM: CPT | Performed by: STUDENT IN AN ORGANIZED HEALTH CARE EDUCATION/TRAINING PROGRAM

## 2024-01-23 NOTE — PROGRESS NOTES
"    Follow Up Office Visit      Date: 2024   Patient Name: Devon Smith  : 1980   MRN: [id number]     Chief Complaint:    Chief Complaint   Patient presents with    Pain     Left thigh        History of Present Illness: Devon Smith is a 43 y.o. male who is here today for left leg pain.    HPI    Muscle cramps  On  night, 2024 he felt what he could describe as a \"Charley horse\" in the hamstring area, which he never had before. He went to bed and felt a little sore. The next day, he felt the same, just a dull ache. When he woke up today, the pain has been getting progressively worse throughout the day. It is a low-level annoying pain. Now, it is down into the arch of his foot as well. It feels like someone kicked him hard. He denies any injury to the leg. He walked on snow, but he did not slip and fall. He cannot straighten his leg without it feeling tight and painful along the posterior thigh. He denies any swelling of the leg. He denies any color changes or bruising. It is a little sensitive on the surface of his leg. He has tingling in his foot. He denies any loss of control of bowel or bladder. He denies any numbness in the groin or back pain. He has not tried anything for the discomfort. He wanted to make sure it was not a blood clot. He denies any active cancer. He has not been bedridden or had any major surgeries. He denies any significant calf swelling. He denies any varicose veins. He has been exercising less. He sat in a chair most of yesterday at work.    Medication maintenance  He changed his rosuvastatin to morning last week because he was coughing too much at night. The doubling up on the rosuvastatin and metoprolol was giving him reflux.    He agrees to an influenza vaccine today.   Subjective      Review of Systems:   Review of Systems   Cardiovascular:  Negative for leg swelling.   Musculoskeletal:  Positive for myalgias.   Skin:  Negative for color change " and bruise.       I have reviewed the patients family history, social history, past medical history, past surgical history and have updated it as appropriate.     Medications:     Current Outpatient Medications:     aspirin 81 MG EC tablet, Take 1 tablet by mouth Daily., Disp: 90 tablet, Rfl: 3    clotrimazole (LOTRIMIN) 1 % cream, Apply 1 application  topically to the appropriate area as directed As Needed., Disp: , Rfl:     empagliflozin (Jardiance) 10 MG tablet tablet, Take 1 tablet by mouth Daily., Disp: 90 tablet, Rfl: 3    escitalopram (LEXAPRO) 10 MG tablet, Take 1 tablet by mouth Daily., Disp: 90 tablet, Rfl: 3    hydrOXYzine (ATARAX) 25 MG tablet, Take 1 tablet by mouth Every 8 (Eight) Hours As Needed for Itching or Anxiety., Disp: 30 tablet, Rfl: 3    lisinopril (PRINIVIL,ZESTRIL) 5 MG tablet, Take 1 tablet by mouth Daily., Disp: 90 tablet, Rfl: 3    metoprolol succinate XL (TOPROL-XL) 25 MG 24 hr tablet, Take 1 tablet by mouth Daily., Disp: 90 tablet, Rfl: 3    nitroglycerin (NITROSTAT) 0.4 MG SL tablet, Place 1 tablet under the tongue Every 5 (Five) Minutes As Needed for Chest Pain. Take no more than 3 doses in 15 minutes., Disp: 25 tablet, Rfl: 12    rosuvastatin (CRESTOR) 40 MG tablet, Take 1 tablet by mouth Every Night., Disp: 90 tablet, Rfl: 3    ticagrelor (BRILINTA) 90 MG tablet tablet, Take 1 tablet by mouth 2 (Two) Times a Day., Disp: 60 tablet, Rfl: 11    valACYclovir (VALTREX) 1000 MG tablet, Take 1 tablet by mouth 2 (Two) Times a Day., Disp: 60 tablet, Rfl: 0    Allergies:   No Known Allergies    Objective     Physical Exam: Please see above  Vital Signs:   Vitals:    01/23/24 0935   BP: 124/78   BP Location: Left arm   Patient Position: Sitting   Cuff Size: Adult   Pulse: 64   Resp: 20   Temp: 98 °F (36.7 °C)   TempSrc: Temporal   Weight: 85.7 kg (189 lb)   PainSc:   4   PainLoc: Leg     Body mass index is 24.94 kg/m².    Physical Exam  Vitals reviewed.   Constitutional:       General: He is  not in acute distress.     Appearance: Normal appearance. He is normal weight. He is not ill-appearing or toxic-appearing.   HENT:      Head: Normocephalic and atraumatic.      Right Ear: External ear normal.      Left Ear: External ear normal.      Nose: Nose normal. No congestion.      Mouth/Throat:      Mouth: Mucous membranes are moist.   Eyes:      General: No scleral icterus.        Right eye: No discharge.         Left eye: No discharge.      Extraocular Movements: Extraocular movements intact.      Pupils: Pupils are equal, round, and reactive to light.   Cardiovascular:      Rate and Rhythm: Normal rate and regular rhythm.      Pulses: Normal pulses.   Pulmonary:      Effort: Pulmonary effort is normal. No respiratory distress.   Abdominal:      General: Abdomen is flat. There is no distension.   Musculoskeletal:         General: Tenderness (mildly tender on squeeze of left hamstring, no swelling, palpable cords, varicosities, erythema, warmth throughout entire leg. Full range of motion) present. No swelling or deformity. Normal range of motion.      Cervical back: Normal range of motion. No rigidity.   Skin:     General: Skin is warm and dry.      Capillary Refill: Capillary refill takes less than 2 seconds.      Coloration: Skin is not jaundiced or pale.   Neurological:      General: No focal deficit present.      Mental Status: He is alert and oriented to person, place, and time. Mental status is at baseline.   Psychiatric:         Mood and Affect: Mood normal.         Behavior: Behavior normal.         Judgment: Judgment normal.         Procedures    Results:   Labs:   Hemoglobin A1C   Date Value Ref Range Status   03/02/2023 5.70 (H) 4.80 - 5.60 % Final     TSH   Date Value Ref Range Status   03/01/2023 3.340 0.270 - 4.200 uIU/mL Final        Imaging:   No valid procedures specified.     Assessment / Plan      Assessment/Plan:   Problem List Items Addressed This Visit    None  Visit Diagnoses        Left leg pain    -  Primary    Cramp and spasm        Encounter for vaccination        Relevant Orders    Fluzone (or Fluarix & Flulaval for VFC) >6mos (Completed)          Wells score -2, very low risk for DVT. Offered lab testing for electrolytes and D-dimer, patient deferred. Counseled on supportive care and indications to rtc.     1. Muscle cramps.  I think it is most likely some muscle cramps. He was advised to do stretching, good hydration, and pickle juice 1 to 2 tablespoons. He was also advised to take over-the-counter magnesium supplements. He was also advised to use heat and do some stretching and gentle exercise. He was advised to avoid nonsteroidals since he is on aspirin and Brilinta. If his symptoms do not improve, he will let us know.    Follow Up:   Return if symptoms worsen or fail to improve.    Galo Borges MD  [facility]  Transcribed from ambient dictation for Galo Borges MD by Yaritza Light.  01/23/24   10:37 EST    Patient or patient representative verbalized consent to the visit recording.  I have personally performed the services described in this document as transcribed by the above individual, and it is both accurate and complete.

## 2024-02-26 RX ORDER — ROSUVASTATIN CALCIUM 40 MG/1
40 TABLET, COATED ORAL NIGHTLY
Qty: 90 TABLET | Refills: 3 | Status: SHIPPED | OUTPATIENT
Start: 2024-02-26

## 2024-03-05 RX ORDER — TICAGRELOR 90 MG/1
90 TABLET ORAL 2 TIMES DAILY
Qty: 180 TABLET | Refills: 3 | Status: SHIPPED | OUTPATIENT
Start: 2024-03-05

## 2024-03-26 ENCOUNTER — OFFICE VISIT (OUTPATIENT)
Dept: CARDIOLOGY | Facility: CLINIC | Age: 44
End: 2024-03-26
Payer: COMMERCIAL

## 2024-03-26 VITALS
OXYGEN SATURATION: 98 % | BODY MASS INDEX: 27.01 KG/M2 | DIASTOLIC BLOOD PRESSURE: 62 MMHG | HEART RATE: 73 BPM | WEIGHT: 199.4 LBS | HEIGHT: 72 IN | SYSTOLIC BLOOD PRESSURE: 100 MMHG

## 2024-03-26 DIAGNOSIS — I25.5 ISCHEMIC CARDIOMYOPATHY: Primary | ICD-10-CM

## 2024-03-26 NOTE — PROGRESS NOTES
Cardiac Electrophysiology Outpatient Note  [facility]    Office Visit     Devon Smith  [id number]  03/26/2024    Primary Care Physician: Galo Borges MD    Referred By: No ref. provider found    Subjective     Chief Complaint   Patient presents with    STEMI involving left anterior descending coronary artery     3-Mo F/U       History of Present Illness:   Devon Smith is a 44 y.o. male who presents to my electrophysiology clinic for follow up of  ischemic cardiomyopathy.  He follows with Dr. Ramos.  He was in his normal state of health in March '23 when he had an acute MI.  He had a mid LAD thrombotic occlusion status post stent.  His EF was initially around 15% in the hospital.  He was started on medications, unfortunately these were limited due to hypotension. Repeat echocardiogram in October '23 showed LVEF 31-35%. He reports he has been experiencing fatigue but denies any chest pain, dyspnea, palpitations, edema or syncopal episodes.     Past Medical History:   Diagnosis Date    Allergic 1/25/78    Hyperlipidemia 4/2022    MRSA cellulitis     Myocardial infarction 3/1/2023       Past Surgical History:   Procedure Laterality Date    CARDIAC CATHETERIZATION N/A 03/01/2023    Procedure: Left Heart Cath;  Surgeon: Arsenio Ramos MD;  Location:  [facility];  Service: Cardiovascular;  Laterality: N/A;    CARDIAC CATHETERIZATION N/A 03/01/2023    Procedure: Optical Coherent Tomography;  Surgeon: Arsenio Ramos MD;  Location:  [facility];  Service: Cardiovascular;  Laterality: N/A;    CARDIAC CATHETERIZATION N/A 03/01/2023    Procedure: Percutaneous Coronary Intervention;  Surgeon: Arsenio Ramos MD;  Location:  [facility];  Service: Cardiovascular;  Laterality: N/A;    CORONARY STENT PLACEMENT  3/1/2023    INCISION AND DRAINAGE ABSCESS  2013       Family History   Problem Relation Age of  Onset    Migraine headaches Mother     Heart attack Father 67        CABG    Heart disease Maternal Grandmother         heart failure    Diabetes Maternal Grandmother     Heart attack Paternal Grandfather         CAD    Colon cancer Neg Hx     Prostate cancer Neg Hx        Social History     Socioeconomic History    Marital status:    Tobacco Use    Smoking status: Never     Passive exposure: Never    Smokeless tobacco: Never   Vaping Use    Vaping status: Never Used   Substance and Sexual Activity    Alcohol use: Never    Drug use: Never    Sexual activity: Not Currently     Partners: Female         Current Outpatient Medications:     aspirin 81 MG EC tablet, Take 1 tablet by mouth Daily., Disp: 90 tablet, Rfl: 3    clotrimazole (LOTRIMIN) 1 % cream, Apply 1 Application topically to the appropriate area as directed As Needed., Disp: , Rfl:     empagliflozin (Jardiance) 10 MG tablet tablet, Take 1 tablet by mouth Daily., Disp: 90 tablet, Rfl: 3    escitalopram (LEXAPRO) 10 MG tablet, Take 1 tablet by mouth Daily., Disp: 90 tablet, Rfl: 3    hydrOXYzine (ATARAX) 25 MG tablet, Take 1 tablet by mouth Every 8 (Eight) Hours As Needed for Itching or Anxiety., Disp: 30 tablet, Rfl: 3    lisinopril (PRINIVIL,ZESTRIL) 5 MG tablet, Take 1 tablet by mouth Daily., Disp: 90 tablet, Rfl: 3    metoprolol succinate XL (TOPROL-XL) 25 MG 24 hr tablet, Take 1 tablet by mouth Daily., Disp: 90 tablet, Rfl: 3    nitroglycerin (NITROSTAT) 0.4 MG SL tablet, Place 1 tablet under the tongue Every 5 (Five) Minutes As Needed for Chest Pain. Take no more than 3 doses in 15 minutes., Disp: 25 tablet, Rfl: 12    rosuvastatin (CRESTOR) 40 MG tablet, TAKE 1 TABLET BY MOUTH ONCE DAILY AT NIGHT, Disp: 90 tablet, Rfl: 3    ticagrelor (Brilinta) 90 MG tablet tablet, Take 1 tablet by mouth 2 (Two) Times a Day., Disp: 180 tablet, Rfl: 3    valACYclovir (VALTREX) 1000 MG tablet, Take 1 tablet by mouth 2 (Two) Times a Day. (Patient taking  "differently: Take 1 tablet by mouth As Needed.), Disp: 60 tablet, Rfl: 0    Allergies:   No Known Allergies    Objective   Vital Signs: Blood pressure 100/62, pulse 73, height 182.9 cm (72\"), weight 90.4 kg (199 lb 6.4 oz), SpO2 98%.    PHYSICAL EXAM  General appearance: Awake, alert, cooperative  Head: Normocephalic, without obvious abnormality, atraumatic  Lungs: Clear to ascultation bilaterally  Heart: Regular rate and rhythm, no murmurs, 2+ LE pulses, no lower extremity swelling  Skin: Skin color, turgor normal, no rashes or lesions  Neurologic: Grossly normal     Lab Results   Component Value Date    GLUCOSE 94 04/17/2023    CALCIUM 9.9 04/17/2023     04/17/2023    K 4.4 04/17/2023    CO2 25.3 04/17/2023     04/17/2023    BUN 18 04/17/2023    CREATININE 1.07 04/17/2023    BCR 16.8 04/17/2023    ANIONGAP 10.7 04/17/2023     Lab Results   Component Value Date    WBC 12.85 (H) 03/03/2023    HGB 13.2 03/03/2023    HCT 39.2 03/03/2023    MCV 90.7 03/03/2023     03/03/2023     No results found for: \"INR\", \"PROTIME\"  Lab Results   Component Value Date    TSH 3.340 03/01/2023          Results for orders placed during the hospital encounter of 10/06/23    Adult Transthoracic Echo Complete W/ Cont if Necessary Per Protocol    Interpretation Summary    Left ventricular systolic function is moderately decreased. Calculated left ventricular EF = 33.5% Left ventricular ejection fraction appears to be 31 - 35%.    The left ventricular cavity is mildly dilated.    The following left ventricular wall segments are akinetic: apical anterior, apical lateral, apical inferior, apical septal and apex.    Left ventricular diastolic function is consistent with (grade II w/high LAP) pseudonormalization.    Estimated right ventricular systolic pressure from tricuspid regurgitation is normal (<35 mmHg). Calculated right ventricular systolic pressure from tricuspid regurgitation is 8 mmHg.    Improved EF compared to " 3/2023 echo     Results for orders placed during the hospital encounter of 03/01/23    Cardiac Catheterization/Vascular Study    Conclusion    Anterior STEMI involving mid % thrombotic occlusion status post OCT guided PCI with a 4.0 x 38 mm Xience KAVON restoring JEREMIE-3 flow.    Normal RCA and left circumflex    LVEF 25-30% due to anterior, apical, and distal RCA akinesis    LVEDP 36 mmHg.      I personally viewed and interpreted the patient's EKG/Telemetry/lab data    Procedures    Devon Martin Silverlarry  reports that he has never smoked. He has never been exposed to tobacco smoke. He has never used smokeless tobacco.        Advance Care Planning   Advance Care Planning: ACP discussion was declined by the patient. Patient does not have an advance directive, declines further assistance.     Assessment & Plan    1. Ischemic cardiomyopathy  3/2023 Anterior STEMI involving mid % thrombotic occlusion status post KAVON. Echo at the time showed LVEF <20%.   Escalating GDMT has been difficult due to hypotension. He is currently on Jardiance 10 mg qd, lisinopril 5 mg, and Toprol-XL 25 mg.  His most recent echo in October showed LVEF 31-35% 7 months post STEMI. This is just under LVEF threshold <35% which would qualify him for ICD implantation for primary prevention of sudden cardiac death. I discussed with him that there is no guarantee that his LVEF will improve any further at this point. He is hesitant to proceed with ICD implantation, understandably, and requests to repeat echocardiogram to evaluate for any improvement in LVEF. This is reasonable and we will get echo in next 2-4 weeks.     The risk of putting off decision for ICD were discussed in detail. Waiting for an echo would put him at around a 0.5-1% risk of developing sustained potentially lethal ventricular arrhythmia. An ICD would have a 7% chance of preventing mortality at 5 years that would otherwise result in death without an ICD. Patient understood  "these risks and was provided with \"A decision aid for ICD for patients with heart failure considering an ICD who are at risk for sudden cardiac death (primary prevention)\" handout.\"    He would likely not qualify for Integra-D trial at this time as he only has NYHA class II symptoms where the inclusion criteria include at least NYHA Class III symptoms.    - Adult Transthoracic Echo Complete W/ Cont if Necessary Per Protocol; Future       Follow Up:  Return in about 3 months (around 6/26/2024).      Thank you for allowing me to participate in the care of your patient. Please do not hesitate to contact me with additional questions or concerns.      Jeremy Wetzel PA-C  Cardiac Electrophysiology  [facility] / [facility]            "

## 2024-03-27 RX ORDER — VALACYCLOVIR HYDROCHLORIDE 1 G/1
1000 TABLET, FILM COATED ORAL AS NEEDED
Start: 2024-03-27

## 2024-03-29 ENCOUNTER — LAB (OUTPATIENT)
Dept: LAB | Facility: HOSPITAL | Age: 44
End: 2024-03-29
Payer: COMMERCIAL

## 2024-03-29 DIAGNOSIS — E78.5 HYPERLIPIDEMIA LDL GOAL <70: ICD-10-CM

## 2024-03-29 DIAGNOSIS — I25.10 CORONARY ARTERY DISEASE INVOLVING NATIVE CORONARY ARTERY OF NATIVE HEART WITHOUT ANGINA PECTORIS: ICD-10-CM

## 2024-03-29 DIAGNOSIS — I10 ESSENTIAL HYPERTENSION: ICD-10-CM

## 2024-03-29 LAB
ALBUMIN SERPL-MCNC: 4.6 G/DL (ref 3.5–5.2)
ALBUMIN/GLOB SERPL: 2.1 G/DL
ALP SERPL-CCNC: 51 U/L (ref 39–117)
ALT SERPL W P-5'-P-CCNC: 32 U/L (ref 1–41)
ANION GAP SERPL CALCULATED.3IONS-SCNC: 9.4 MMOL/L (ref 5–15)
AST SERPL-CCNC: 24 U/L (ref 1–40)
BASOPHILS # BLD AUTO: 0.04 10*3/MM3 (ref 0–0.2)
BASOPHILS NFR BLD AUTO: 0.6 % (ref 0–1.5)
BILIRUB SERPL-MCNC: 1 MG/DL (ref 0–1.2)
BUN SERPL-MCNC: 18 MG/DL (ref 6–20)
BUN/CREAT SERPL: 16.7 (ref 7–25)
CALCIUM SPEC-SCNC: 9 MG/DL (ref 8.6–10.5)
CHLORIDE SERPL-SCNC: 105 MMOL/L (ref 98–107)
CHOLEST SERPL-MCNC: 127 MG/DL (ref 0–200)
CO2 SERPL-SCNC: 26.6 MMOL/L (ref 22–29)
CREAT SERPL-MCNC: 1.08 MG/DL (ref 0.76–1.27)
DEPRECATED RDW RBC AUTO: 42.6 FL (ref 37–54)
EGFRCR SERPLBLD CKD-EPI 2021: 85.7 ML/MIN/1.73
EOSINOPHIL # BLD AUTO: 0.14 10*3/MM3 (ref 0–0.4)
EOSINOPHIL NFR BLD AUTO: 2.2 % (ref 0.3–6.2)
ERYTHROCYTE [DISTWIDTH] IN BLOOD BY AUTOMATED COUNT: 12.8 % (ref 12.3–15.4)
GLOBULIN UR ELPH-MCNC: 2.2 GM/DL
GLUCOSE SERPL-MCNC: 98 MG/DL (ref 65–99)
HCT VFR BLD AUTO: 41.6 % (ref 37.5–51)
HDLC SERPL-MCNC: 53 MG/DL (ref 40–60)
HGB BLD-MCNC: 13.7 G/DL (ref 13–17.7)
IMM GRANULOCYTES # BLD AUTO: 0.01 10*3/MM3 (ref 0–0.05)
IMM GRANULOCYTES NFR BLD AUTO: 0.2 % (ref 0–0.5)
LDLC SERPL CALC-MCNC: 59 MG/DL (ref 0–100)
LDLC/HDLC SERPL: 1.11 {RATIO}
LYMPHOCYTES # BLD AUTO: 2.35 10*3/MM3 (ref 0.7–3.1)
LYMPHOCYTES NFR BLD AUTO: 37.4 % (ref 19.6–45.3)
MCH RBC QN AUTO: 30.1 PG (ref 26.6–33)
MCHC RBC AUTO-ENTMCNC: 32.9 G/DL (ref 31.5–35.7)
MCV RBC AUTO: 91.4 FL (ref 79–97)
MONOCYTES # BLD AUTO: 0.68 10*3/MM3 (ref 0.1–0.9)
MONOCYTES NFR BLD AUTO: 10.8 % (ref 5–12)
NEUTROPHILS NFR BLD AUTO: 3.07 10*3/MM3 (ref 1.7–7)
NEUTROPHILS NFR BLD AUTO: 48.8 % (ref 42.7–76)
NRBC BLD AUTO-RTO: 0 /100 WBC (ref 0–0.2)
PLATELET # BLD AUTO: 194 10*3/MM3 (ref 140–450)
PMV BLD AUTO: 10.2 FL (ref 6–12)
POTASSIUM SERPL-SCNC: 4.3 MMOL/L (ref 3.5–5.2)
PROT SERPL-MCNC: 6.8 G/DL (ref 6–8.5)
RBC # BLD AUTO: 4.55 10*6/MM3 (ref 4.14–5.8)
SODIUM SERPL-SCNC: 141 MMOL/L (ref 136–145)
TRIGL SERPL-MCNC: 75 MG/DL (ref 0–150)
VLDLC SERPL-MCNC: 15 MG/DL (ref 5–40)
WBC NRBC COR # BLD AUTO: 6.29 10*3/MM3 (ref 3.4–10.8)

## 2024-03-29 PROCEDURE — 80061 LIPID PANEL: CPT

## 2024-03-29 PROCEDURE — 85025 COMPLETE CBC W/AUTO DIFF WBC: CPT

## 2024-03-29 PROCEDURE — 80053 COMPREHEN METABOLIC PANEL: CPT

## 2024-03-29 PROCEDURE — 36415 COLL VENOUS BLD VENIPUNCTURE: CPT

## 2024-04-23 ENCOUNTER — APPOINTMENT (OUTPATIENT)
Dept: GENERAL RADIOLOGY | Facility: HOSPITAL | Age: 44
End: 2024-04-23
Payer: COMMERCIAL

## 2024-04-23 ENCOUNTER — HOSPITAL ENCOUNTER (EMERGENCY)
Facility: HOSPITAL | Age: 44
Discharge: HOME | End: 2024-04-23
Attending: EMERGENCY MEDICINE | Admitting: EMERGENCY MEDICINE
Payer: COMMERCIAL

## 2024-04-23 ENCOUNTER — TELEPHONE (OUTPATIENT)
Dept: INTERNAL MEDICINE | Facility: CLINIC | Age: 44
End: 2024-04-23
Payer: COMMERCIAL

## 2024-04-23 VITALS
WEIGHT: 195 LBS | SYSTOLIC BLOOD PRESSURE: 125 MMHG | HEART RATE: 80 BPM | DIASTOLIC BLOOD PRESSURE: 78 MMHG | TEMPERATURE: 97.3 F | OXYGEN SATURATION: 97 % | RESPIRATION RATE: 18 BRPM | BODY MASS INDEX: 25.84 KG/M2 | HEIGHT: 73 IN

## 2024-04-23 DIAGNOSIS — R07.9 ACUTE CHEST PAIN: Primary | ICD-10-CM

## 2024-04-23 DIAGNOSIS — Z86.79 HISTORY OF CORONARY ARTERY DISEASE: ICD-10-CM

## 2024-04-23 DIAGNOSIS — Z86.39 HISTORY OF HYPERLIPIDEMIA: ICD-10-CM

## 2024-04-23 LAB
ALBUMIN SERPL-MCNC: 4.5 G/DL (ref 3.5–5.2)
ALBUMIN/GLOB SERPL: 1.6 G/DL
ALP SERPL-CCNC: 57 U/L (ref 39–117)
ALT SERPL W P-5'-P-CCNC: 61 U/L (ref 1–41)
ANION GAP SERPL CALCULATED.3IONS-SCNC: 9 MMOL/L (ref 5–15)
AST SERPL-CCNC: 28 U/L (ref 1–40)
BASOPHILS # BLD AUTO: 0.03 10*3/MM3 (ref 0–0.2)
BASOPHILS NFR BLD AUTO: 0.3 % (ref 0–1.5)
BILIRUB SERPL-MCNC: 1.1 MG/DL (ref 0–1.2)
BUN SERPL-MCNC: 24 MG/DL (ref 6–20)
BUN/CREAT SERPL: 31.6 (ref 7–25)
CALCIUM SPEC-SCNC: 9.4 MG/DL (ref 8.6–10.5)
CHLORIDE SERPL-SCNC: 100 MMOL/L (ref 98–107)
CO2 SERPL-SCNC: 30 MMOL/L (ref 22–29)
CREAT SERPL-MCNC: 0.76 MG/DL (ref 0.76–1.27)
DEPRECATED RDW RBC AUTO: 45.4 FL (ref 37–54)
EGFRCR SERPLBLD CKD-EPI 2021: 112.3 ML/MIN/1.73
EOSINOPHIL # BLD AUTO: 0.15 10*3/MM3 (ref 0–0.4)
EOSINOPHIL NFR BLD AUTO: 1.6 % (ref 0.3–6.2)
ERYTHROCYTE [DISTWIDTH] IN BLOOD BY AUTOMATED COUNT: 13 % (ref 12.3–15.4)
GEN 5 2HR TROPONIN T REFLEX: 11 NG/L
GLOBULIN UR ELPH-MCNC: 2.8 GM/DL
GLUCOSE SERPL-MCNC: 124 MG/DL (ref 65–99)
HCT VFR BLD AUTO: 44.4 % (ref 37.5–51)
HGB BLD-MCNC: 14.3 G/DL (ref 13–17.7)
HOLD SPECIMEN: NORMAL
IMM GRANULOCYTES # BLD AUTO: 0.04 10*3/MM3 (ref 0–0.05)
IMM GRANULOCYTES NFR BLD AUTO: 0.4 % (ref 0–0.5)
LIPASE SERPL-CCNC: 26 U/L (ref 13–60)
LYMPHOCYTES # BLD AUTO: 1.61 10*3/MM3 (ref 0.7–3.1)
LYMPHOCYTES NFR BLD AUTO: 16.8 % (ref 19.6–45.3)
MCH RBC QN AUTO: 30.6 PG (ref 26.6–33)
MCHC RBC AUTO-ENTMCNC: 32.2 G/DL (ref 31.5–35.7)
MCV RBC AUTO: 94.9 FL (ref 79–97)
MONOCYTES # BLD AUTO: 1.1 10*3/MM3 (ref 0.1–0.9)
MONOCYTES NFR BLD AUTO: 11.5 % (ref 5–12)
NEUTROPHILS NFR BLD AUTO: 6.65 10*3/MM3 (ref 1.7–7)
NEUTROPHILS NFR BLD AUTO: 69.4 % (ref 42.7–76)
NRBC BLD AUTO-RTO: 0 /100 WBC (ref 0–0.2)
NT-PROBNP SERPL-MCNC: 620.1 PG/ML (ref 0–450)
PLATELET # BLD AUTO: 223 10*3/MM3 (ref 140–450)
PMV BLD AUTO: 9.5 FL (ref 6–12)
POTASSIUM SERPL-SCNC: 3.4 MMOL/L (ref 3.5–5.2)
PROT SERPL-MCNC: 7.3 G/DL (ref 6–8.5)
RBC # BLD AUTO: 4.68 10*6/MM3 (ref 4.14–5.8)
SODIUM SERPL-SCNC: 139 MMOL/L (ref 136–145)
TROPONIN T DELTA: 0 NG/L
TROPONIN T SERPL HS-MCNC: 11 NG/L
WBC NRBC COR # BLD AUTO: 9.58 10*3/MM3 (ref 3.4–10.8)
WHOLE BLOOD HOLD COAG: NORMAL
WHOLE BLOOD HOLD SPECIMEN: NORMAL

## 2024-04-23 PROCEDURE — 85025 COMPLETE CBC W/AUTO DIFF WBC: CPT | Performed by: EMERGENCY MEDICINE

## 2024-04-23 PROCEDURE — 80053 COMPREHEN METABOLIC PANEL: CPT | Performed by: EMERGENCY MEDICINE

## 2024-04-23 PROCEDURE — 93005 ELECTROCARDIOGRAM TRACING: CPT | Performed by: EMERGENCY MEDICINE

## 2024-04-23 PROCEDURE — 71045 X-RAY EXAM CHEST 1 VIEW: CPT

## 2024-04-23 PROCEDURE — 99284 EMERGENCY DEPT VISIT MOD MDM: CPT | Performed by: INTERNAL MEDICINE

## 2024-04-23 PROCEDURE — 99284 EMERGENCY DEPT VISIT MOD MDM: CPT

## 2024-04-23 PROCEDURE — 36415 COLL VENOUS BLD VENIPUNCTURE: CPT

## 2024-04-23 PROCEDURE — 83880 ASSAY OF NATRIURETIC PEPTIDE: CPT | Performed by: EMERGENCY MEDICINE

## 2024-04-23 PROCEDURE — 84484 ASSAY OF TROPONIN QUANT: CPT | Performed by: EMERGENCY MEDICINE

## 2024-04-23 PROCEDURE — 83690 ASSAY OF LIPASE: CPT | Performed by: EMERGENCY MEDICINE

## 2024-04-23 RX ORDER — ASPIRIN 81 MG/1
324 TABLET, CHEWABLE ORAL ONCE
Status: COMPLETED | OUTPATIENT
Start: 2024-04-23 | End: 2024-04-23

## 2024-04-23 RX ORDER — SPIRONOLACTONE 25 MG/1
12.5 TABLET ORAL DAILY
Qty: 30 TABLET | Refills: 5 | Status: SHIPPED | OUTPATIENT
Start: 2024-04-23

## 2024-04-23 RX ORDER — SODIUM CHLORIDE 0.9 % (FLUSH) 0.9 %
10 SYRINGE (ML) INJECTION AS NEEDED
Status: DISCONTINUED | OUTPATIENT
Start: 2024-04-23 | End: 2024-04-23 | Stop reason: HOSPADM

## 2024-04-23 RX ADMIN — ASPIRIN 324 MG: 81 TABLET, CHEWABLE ORAL at 11:06

## 2024-04-23 NOTE — ED PROVIDER NOTES
[facility]    EMERGENCY DEPARTMENT ENCOUNTER      Pt Name: Devon Smith  MRN: [id number]  YOB: 1980  Date of evaluation: 4/23/2024  Provider: Juan Gamez MD    CHIEF COMPLAINT       Chief Complaint   Patient presents with    Chest Pain         HISTORY OF PRESENT ILLNESS   Devon Smith is a 44 y.o. male who presents to the emergency department with complaint of sudden onset right-sided chest pain radiating down the right arm that began about an hour prior to arrival to the emergency department.  Patient has had prior history of coronary disease/STEMI and required stent placement.  He says that pain is now currently resolved.  Symptoms not associated with any shortness of breath, vomiting, or diaphoresis      Nursing notes were reviewed.    REVIEW OF SYSTEMS     ROS:  A chief complaint appropriate review of systems was completed and is negative except as noted in the HPI.      PAST MEDICAL HISTORY     Past Medical History:   Diagnosis Date    Allergic 1/25/78    Hyperlipidemia 4/2022    MRSA cellulitis     Myocardial infarction 3/1/2023         SURGICAL HISTORY       Past Surgical History:   Procedure Laterality Date    CARDIAC CATHETERIZATION N/A 03/01/2023    Procedure: Left Heart Cath;  Surgeon: Arsenio Ramos MD;  Location: [facility] CATH INVASIVE LOCATION;  Service: Cardiovascular;  Laterality: N/A;    CARDIAC CATHETERIZATION N/A 03/01/2023    Procedure: Optical Coherent Tomography;  Surgeon: Arsenio Ramos MD;  Location: [facility] CATH INVASIVE LOCATION;  Service: Cardiovascular;  Laterality: N/A;    CARDIAC CATHETERIZATION N/A 03/01/2023    Procedure: Percutaneous Coronary Intervention;  Surgeon: Arsenio Ramos MD;  Location: [facility] CATH INVASIVE LOCATION;  Service: Cardiovascular;  Laterality: N/A;    CORONARY STENT PLACEMENT  3/1/2023    INCISION AND DRAINAGE ABSCESS  2013         CURRENT MEDICATIONS       Current Facility-Administered Medications:     sodium chloride 0.9  % flush 10 mL, 10 mL, Intravenous, PRN, Juan Gamez MD    Current Outpatient Medications:     aspirin 81 MG EC tablet, Take 1 tablet by mouth Daily., Disp: 90 tablet, Rfl: 3    azelastine (ASTEPRO) 0.15 % solution nasal spray, 2 sprays into the nostril(s) as directed by provider 2 (Two) Times a Day for 10 days., Disp: 30 mL, Rfl: 0    clotrimazole (LOTRIMIN) 1 % cream, Apply 1 Application topically to the appropriate area as directed As Needed., Disp: , Rfl:     empagliflozin (Jardiance) 10 MG tablet tablet, Take 1 tablet by mouth Daily., Disp: 90 tablet, Rfl: 3    escitalopram (LEXAPRO) 10 MG tablet, Take 1 tablet by mouth Daily., Disp: 90 tablet, Rfl: 3    hydrOXYzine (ATARAX) 25 MG tablet, Take 1 tablet by mouth Every 8 (Eight) Hours As Needed for Itching or Anxiety., Disp: 30 tablet, Rfl: 3    lisinopril (PRINIVIL,ZESTRIL) 5 MG tablet, Take 1 tablet by mouth Daily., Disp: 90 tablet, Rfl: 3    methylPREDNISolone (MEDROL) 4 MG dose pack, Take as directed on package instructions., Disp: 21 tablet, Rfl: 0    metoprolol succinate XL (TOPROL-XL) 25 MG 24 hr tablet, Take 1 tablet by mouth Daily., Disp: 90 tablet, Rfl: 3    nitroglycerin (NITROSTAT) 0.4 MG SL tablet, Place 1 tablet under the tongue Every 5 (Five) Minutes As Needed for Chest Pain. Take no more than 3 doses in 15 minutes., Disp: 25 tablet, Rfl: 12    rosuvastatin (CRESTOR) 40 MG tablet, TAKE 1 TABLET BY MOUTH ONCE DAILY AT NIGHT, Disp: 90 tablet, Rfl: 3    spironolactone (ALDACTONE) 25 MG tablet, Take 0.5 tablets by mouth Daily., Disp: 30 tablet, Rfl: 5    ticagrelor (Brilinta) 90 MG tablet tablet, Take 1 tablet by mouth 2 (Two) Times a Day., Disp: 180 tablet, Rfl: 3    valACYclovir (VALTREX) 1000 MG tablet, Take 1 tablet by mouth As Needed (flareup)., Disp: , Rfl:     ALLERGIES     Patient has no known allergies.    FAMILY HISTORY       Family History   Problem Relation Age of Onset    Migraine headaches Mother     Heart attack Father 67         CABG    Heart disease Maternal Grandmother         heart failure    Diabetes Maternal Grandmother     Heart attack Paternal Grandfather         CAD    Colon cancer Neg Hx     Prostate cancer Neg Hx           SOCIAL HISTORY       Social History     Socioeconomic History    Marital status:    Tobacco Use    Smoking status: Never     Passive exposure: Never    Smokeless tobacco: Never   Vaping Use    Vaping status: Never Used   Substance and Sexual Activity    Alcohol use: Never    Drug use: Never    Sexual activity: Not Currently     Partners: Female         PHYSICAL EXAM    (up to 7 for level 4, 8 or more for level 5)     Vitals:    04/23/24 1230 04/23/24 1300 04/23/24 1330 04/23/24 1430   BP: 116/74 111/77 128/83 125/78   BP Location:       Patient Position:       Pulse: 71 71 73 80   Resp:       Temp:       TempSrc:       SpO2: 96% 95% 97% 97%   Weight:       Height:           General: Awake, alert, no acute distress.  HEENT: Conjunctivae normal.  Neck: Trachea midline.  Cardiac: Heart regular rate, rhythm, no murmurs, rubs, or gallops  Lungs: Lungs are clear to auscultation, there is no wheezing, rhonchi, or rales. There is no use of accessory muscles.  Chest wall: There is no tenderness to palpation over the chest wall or over ribs  Abdomen: Abdomen is soft, nontender, nondistended. There are no firm or pulsatile masses, no rebound rigidity or guarding.   Musculoskeletal: No deformity.  Neuro: Alert and oriented x 4.  Dermatology: Skin is warm and dry  Psych: Mentation is grossly normal, cognition is grossly normal. Affect is appropriate.        DIAGNOSTIC RESULTS     EKG: All EKGs are interpreted by the Emergency Department Physician who either signs or Co-signs this chart in the absence of a cardiologist.    ECG 12 Lead ED Triage Standing Order; Chest Pain   Preliminary Result   Test Reason : ED Triage Standing Order~   Blood Pressure :   */*   mmHG   Vent. Rate :  68 BPM     Atrial Rate :  68 BPM       P-R Int : 158 ms          QRS Dur :  94 ms       QT Int : 422 ms       P-R-T Axes :  25  23  77 degrees      QTc Int : 448 ms      Normal sinus rhythm   Possible Inferior infarct , age undetermined   Anteroseptal infarct (cited on or before 01-MAR-2023)   Abnormal ECG   When compared with ECG of 23-APR-2024 11:19, (Unconfirmed)   No significant change was found      Referred By: ED MD           Confirmed By:       ECG 12 Lead Chest Pain   Preliminary Result   Test Reason : Chest Pain   Blood Pressure :   */*   mmHG   Vent. Rate :  70 BPM     Atrial Rate :  70 BPM      P-R Int : 152 ms          QRS Dur :  90 ms       QT Int : 396 ms       P-R-T Axes :  46  40 113 degrees      QTc Int : 427 ms      Normal sinus rhythm   Anteroseptal infarct (cited on or before 01-MAR-2023)   ST & T wave abnormality, consider lateral ischemia   Abnormal ECG   When compared with ECG of 23-APR-2024 10:53, (Unconfirmed)   Non-specific change in ST segment in Inferior leads   Non-specific change in ST segment in Lateral leads      Referred By: ED MD           Confirmed By:       ECG 12 Lead ED Triage Standing Order; Chest Pain   Preliminary Result   Test Reason : ED Triage Standing Order~   Blood Pressure :   */*   mmHG   Vent. Rate :  67 BPM     Atrial Rate :  67 BPM      P-R Int : 140 ms          QRS Dur :  92 ms       QT Int : 388 ms       P-R-T Axes :  69  21  43 degrees      QTc Int : 409 ms      Normal sinus rhythm   Possible Inferior infarct (cited on or before 01-MAR-2023)   Anteroseptal infarct (cited on or before 01-MAR-2023)   Abnormal ECG   When compared with ECG of 02-MAR-2023 05:54,   Vent. rate has decreased BY  50 BPM   Serial changes of Anteroseptal infarct present      Referred By: NAS           Confirmed By:             RADIOLOGY:   [x] Radiologist's Report Reviewed:  XR Chest 1 View   Final Result   Impression:   No evidence of acute cardiopulmonary disease.          Electronically Signed: Justin Bloom MD      4/23/2024 11:36 AM EDT     Workstation ID: [id number]          I ordered and independently reviewed the above noted radiographic studies.        LABS:    I have reviewed and interpreted all of the currently available lab results from this visit (if applicable):  Results for orders placed or performed during the hospital encounter of 04/23/24   High Sensitivity Troponin T    Specimen: Blood   Result Value Ref Range    HS Troponin T 11 <22 ng/L   Comprehensive Metabolic Panel    Specimen: Blood   Result Value Ref Range    Glucose 124 (H) 65 - 99 mg/dL    BUN 24 (H) 6 - 20 mg/dL    Creatinine 0.76 0.76 - 1.27 mg/dL    Sodium 139 136 - 145 mmol/L    Potassium 3.4 (L) 3.5 - 5.2 mmol/L    Chloride 100 98 - 107 mmol/L    CO2 30.0 (H) 22.0 - 29.0 mmol/L    Calcium 9.4 8.6 - 10.5 mg/dL    Total Protein 7.3 6.0 - 8.5 g/dL    Albumin 4.5 3.5 - 5.2 g/dL    ALT (SGPT) 61 (H) 1 - 41 U/L    AST (SGOT) 28 1 - 40 U/L    Alkaline Phosphatase 57 39 - 117 U/L    Total Bilirubin 1.1 0.0 - 1.2 mg/dL    Globulin 2.8 gm/dL    A/G Ratio 1.6 g/dL    BUN/Creatinine Ratio 31.6 (H) 7.0 - 25.0    Anion Gap 9.0 5.0 - 15.0 mmol/L    eGFR 112.3 >60.0 mL/min/1.73   Lipase    Specimen: Blood   Result Value Ref Range    Lipase 26 13 - 60 U/L   BNP    Specimen: Blood   Result Value Ref Range    proBNP 620.1 (H) 0.0 - 450.0 pg/mL   CBC Auto Differential    Specimen: Blood   Result Value Ref Range    WBC 9.58 3.40 - 10.80 10*3/mm3    RBC 4.68 4.14 - 5.80 10*6/mm3    Hemoglobin 14.3 13.0 - 17.7 g/dL    Hematocrit 44.4 37.5 - 51.0 %    MCV 94.9 79.0 - 97.0 fL    MCH 30.6 26.6 - 33.0 pg    MCHC 32.2 31.5 - 35.7 g/dL    RDW 13.0 12.3 - 15.4 %    RDW-SD 45.4 37.0 - 54.0 fl    MPV 9.5 6.0 - 12.0 fL    Platelets 223 140 - 450 10*3/mm3    Neutrophil % 69.4 42.7 - 76.0 %    Lymphocyte % 16.8 (L) 19.6 - 45.3 %    Monocyte % 11.5 5.0 - 12.0 %    Eosinophil % 1.6 0.3 - 6.2 %    Basophil % 0.3 0.0 - 1.5 %    Immature Grans % 0.4 0.0 - 0.5 %    Neutrophils, Absolute  6.65 1.70 - 7.00 10*3/mm3    Lymphocytes, Absolute 1.61 0.70 - 3.10 10*3/mm3    Monocytes, Absolute 1.10 (H) 0.10 - 0.90 10*3/mm3    Eosinophils, Absolute 0.15 0.00 - 0.40 10*3/mm3    Basophils, Absolute 0.03 0.00 - 0.20 10*3/mm3    Immature Grans, Absolute 0.04 0.00 - 0.05 10*3/mm3    nRBC 0.0 0.0 - 0.2 /100 WBC   High Sensitivity Troponin T 2Hr    Specimen: Blood   Result Value Ref Range    HS Troponin T 11 <22 ng/L    Troponin T Delta 0 >=-4 - <+4 ng/L   ECG 12 Lead ED Triage Standing Order; Chest Pain   Result Value Ref Range    QT Interval 388 ms    QTC Interval 409 ms   ECG 12 Lead ED Triage Standing Order; Chest Pain   Result Value Ref Range    QT Interval 422 ms    QTC Interval 448 ms   ECG 12 Lead Chest Pain   Result Value Ref Range    QT Interval 396 ms    QTC Interval 427 ms   Green Top (Gel)   Result Value Ref Range    Extra Tube Hold for add-ons.    Lavender Top   Result Value Ref Range    Extra Tube hold for add-on    Gold Top - SST   Result Value Ref Range    Extra Tube Hold for add-ons.    Gray Top   Result Value Ref Range    Extra Tube Hold for add-ons.    Light Blue Top   Result Value Ref Range    Extra Tube Hold for add-ons.         If labs were ordered, I independently reviewed the results and considered them in treating the patient.      EMERGENCY DEPARTMENT COURSE and DIFFERENTIAL DIAGNOSIS/MDM:   Vitals:  AS OF 16:26 EDT    BP - 125/78  HR - 80  TEMP - 97.3 °F (36.3 °C) (Oral)  O2 SATS - 97%        Discussion below represents my analysis of pertinent findings related to patient's condition, differential diagnosis, treatment plan and final disposition.      Differential diagnosis:  The differential diagnosis associated with the patient's presentation includes: ACS, pneumonia, pneumothorax, costochondritis, GERD      Independent interpretations (ECG/rhythm strip/X-ray/US/CT scan): I independently interpreted the patient's chest x-ray and cardiac monitor.  The patient is in sinus rhythm and there  is no evidence of pulmonary infiltrate.      Additional sources:  Discussed/obtained information from independent historians:   [] Spouse:   [] Parent:   [] Friend:   [] EMS:   [] Other:  External (non-ED) record review:   [] Inpatient record:   [] Office record:   [] Outpatient record:   [] Prior Outpatient labs:   [] Prior Outpatient radiology:   [] Primary Care record:   [] Outside ED record:   [x] Other: Reviewed past cardiac catheterization.  Patient had complete LAD occlusion requiring stenting.      Patient's care impacted by:   [] Diabetes   [] Hypertension   [x] Coronary Artery Disease   [] Cancer   [x] Other: Hyperlipidemia    Care significantly affected by Social Determinants of Health (housing and economic circumstances, unemployment)    [] Yes     [x] No   If yes, Patient's care significantly limited by  Social Determinants of Health including:    [] Inadequate housing    [] Low income    [] Alcoholism and drug addiction in family    [] Problems related to primary support group    [] Unemployment    [] Problems related to employment    [] Other Social Determinants of Health:       Consideration of admission/observation vs discharge: Considered admission, however cardiology evaluated the patient and felt that he was safe for discharge home.      ED Course:    ED Course as of 04/23/24 1626   Tue Apr 23, 2024   1119 Patient is currently completely asymptomatic   [NS]   1231 Discussed with Yaritza in the cardiology office.  They will evaluate the patient in the ED. [NS]      ED Course User Index  [NS] Juan Gamez MD         Patient asymptomatic throughout the course of his stay in the emergency department.  Troponin x 2 were negative.  History not consistent with other emergent cause of chest pain such as dissection or pulmonary embolism.  Cardiology evaluated the patient in the ED and felt that he was safe for discharge home with outpatient follow-up.    I had a discussion with the patient/family  regarding diagnosis, diagnostic results, treatment plan, and medications.  The patient/family indicated understanding of these instructions.  I spent adequate time at the bedside preceding discharge necessary to personally discuss the aftercare instructions, giving patient education, providing explanations of the results of our evaluations/findings, and my decision making to assure that the patient/family understand the plan of care.  Time was allotted to answer questions at that time and throughout the ED course.  Emphasis was placed on timely follow-up after discharge.  I also discussed the potential for the development of an acute emergent condition requiring further evaluation, admission, or even surgical intervention. I discussed that we found nothing during the visit today indicating the need for further workup, admission, or the presence of an unstable medical condition.  I encouraged the patient to return to the emergency department immediately for ANY concerns, worsening, new complaints, or if symptoms persist and unable to seek follow-up in a timely fashion.  The patient/family expressed understanding and agreement with this plan.  The patient will follow-up with their PCP in 1-2 days for reevaluation.           PROCEDURES:  Procedures    CRITICAL CARE TIME        FINAL IMPRESSION      1. Acute chest pain    2. History of coronary artery disease    3. History of hyperlipidemia          DISPOSITION/PLAN     ED Disposition       ED Disposition   Discharge    Condition   Stable    Comment   --                 Comment: Please note this report has been produced using speech recognition software.      Juan Gamez MD  Attending Emergency Physician             Juan Gamez MD  04/23/24 [id number]

## 2024-04-23 NOTE — TELEPHONE ENCOUNTER
Patient calling with chest pain on right side moved towards the middle of the chest, it lasted about 5 mins. Advised patient to go to the ER. Patient verbalized good understanding.

## 2024-04-23 NOTE — CONSULTS
[facility] at [facility]  CARDIOLOGY CONSULTATION NOTE    Devon Smith  : 1980  MRN:[id number]  Home Phone:[phone]    Date of Consultation: 24    PCP: Galo Borges MD    IDENTIFICATION: A 44 y.o. male resident of [place], [place]     Chief Complaint   Patient presents with    Chest Pain     PROBLEM LIST:   Active Hospital Problems    Diagnosis     Ischemic cardiomyopathy     Generalized anxiety disorder     Acute systolic heart failure      Echo 3/2/23: EF <20%, hypokinetic wall segments, grade II diastolic dysfunction, no significant valvular disease, no LV thrombus      Coronary artery disease involving native coronary artery of native heart without angina pectoris      3/1/2023: Anterior STEMI, mid LAD 10 percent thrombotic occlusion status post OCT guided PCI with a 4.0 x 38 mm Xience KAVON, proximal LAD 30-40%, normal circumflex and RCA, LVEF 25-30% due to anterior apical and distal inferior akinesis, EDP 36 mmHg      Hyperlipidemia LDL goal <70     Essential hypertension      ALLERGIES: No Known Allergies    HOME MEDICINES:   Current Outpatient Medications   Medication Instructions    aspirin 81 mg, Oral, Daily    azelastine (ASTEPRO) 0.15 % solution nasal spray 2 sprays, Nasal, 2 Times Daily    clotrimazole (LOTRIMIN) 1 % cream 1 application , Topical, As Needed    empagliflozin (JARDIANCE) 10 mg, Oral, Daily    escitalopram (LEXAPRO) 10 mg, Oral, Daily    hydrOXYzine (ATARAX) 25 mg, Oral, Every 8 Hours PRN    lisinopril (PRINIVIL,ZESTRIL) 5 mg, Oral, Daily    methylPREDNISolone (MEDROL) 4 MG dose pack Take as directed on package instructions.    metoprolol succinate XL (TOPROL-XL) 25 mg, Oral, Daily    nitroglycerin (NITROSTAT) 0.4 mg, Sublingual, Every 5 Minutes PRN, Take no more than 3 doses in 15 minutes.    rosuvastatin (CRESTOR) 40 mg, Oral, Nightly    ticagrelor (BRILINTA) 90 mg, Oral, 2 Times Daily    valACYclovir (VALTREX) 1,000 mg, Oral, As Needed      HPI: Mr. Smith is a 43 y/o male with history of anterior STEMI in 2023, ICM/HFrEF, and anxiety who is seen in consultation for right sided chest pain. He reports episode of right sided pain that started in his lower right side, radiated up to the right chest and midsternal area, and he had some associated right upper arm discomfort. This started at rest while he was sitting down at work, lasted about 10 minutes and self resolved.  He called his PCP who instructed him to present to the ER. His initial troponin is negative at 11, EKGs with no acute ischemic changes, with evidence of old anterior MI. He has recently been prescribed steroid pack and nasal spray for allergies.  He reports a lot of coughing related to his allergies, and chest soreness related to cough. He is otherwise active, loaded fence posts into his truck the other day and walks a lot without any chest discomfort, or unusual dyspnea.       ROS: All systems have been reviewed and are negative with the exception of those mentioned in the HPI and problem list above.    Surgical History:   Past Surgical History:   Procedure Laterality Date    CARDIAC CATHETERIZATION N/A 03/01/2023    Procedure: Left Heart Cath;  Surgeon: Arsenio Ramos MD;  Location: [facility] CATH INVASIVE LOCATION;  Service: Cardiovascular;  Laterality: N/A;    CARDIAC CATHETERIZATION N/A 03/01/2023    Procedure: Optical Coherent Tomography;  Surgeon: Arsenio Ramos MD;  Location: [facility] CATH INVASIVE LOCATION;  Service: Cardiovascular;  Laterality: N/A;    CARDIAC CATHETERIZATION N/A 03/01/2023    Procedure: Percutaneous Coronary Intervention;  Surgeon: Arsenoi Ramos MD;  Location: [facility] CATH INVASIVE LOCATION;  Service: Cardiovascular;  Laterality: N/A;    CORONARY STENT PLACEMENT  3/1/2023    INCISION AND DRAINAGE ABSCESS  2013       Social History:   Social History     Socioeconomic History    Marital status:    Tobacco Use    Smoking status: Never     Passive  "exposure: Never    Smokeless tobacco: Never   Vaping Use    Vaping status: Never Used   Substance and Sexual Activity    Alcohol use: Never    Drug use: Never    Sexual activity: Not Currently     Partners: Female       Family History:   Family History   Problem Relation Age of Onset    Migraine headaches Mother     Heart attack Father 67        CABG    Heart disease Maternal Grandmother         heart failure    Diabetes Maternal Grandmother     Heart attack Paternal Grandfather         CAD    Colon cancer Neg Hx     Prostate cancer Neg Hx        Objective     /74   Pulse 71   Temp 97.3 °F (36.3 °C) (Oral)   Resp 18   Ht 185.4 cm (73\")   Wt 88.5 kg (195 lb)   SpO2 96%   BMI 25.73 kg/m²   No intake or output data in the 24 hours ending 04/23/24 1300    PHYSICAL EXAM:  CONSTITUTIONAL: Well nourished, cooperative, in no acute distress  HEENT: Normocephalic, atraumatic, PERRLA, no JVD  CARDIOVASCULAR:  Regular rhythm and normal rate, no murmur, gallop, rub.   RESPIRATORY: Clear to auscultation, normal respiratory effort, no wheezing, rales or rhonchi  EXTREMITIES: No gross deformities, no edema.   SKIN: Warm, dry. No bleeding, bruising or rash  NEUROLOGICAL: No focal deficits    Labs/Diagnostic Data  Results from last 7 days   Lab Units 04/23/24  1106   SODIUM mmol/L 139   POTASSIUM mmol/L 3.4*   CHLORIDE mmol/L 100   CO2 mmol/L 30.0*   BUN mg/dL 24*   CREATININE mg/dL 0.76   GLUCOSE mg/dL 124*   CALCIUM mg/dL 9.4     Results from last 7 days   Lab Units 04/23/24  1106   HSTROP T ng/L 11     Results from last 7 days   Lab Units 04/23/24  1106   WBC 10*3/mm3 9.58   HEMOGLOBIN g/dL 14.3   HEMATOCRIT % 44.4   PLATELETS 10*3/mm3 223         Results from last 7 days   Lab Units 04/23/24  1106   PROBNP pg/mL 620.1*       I personally reviewed the patient's EKG/Telemetry data    Radiology Data:   XR Chest 1 View    Result Date: 4/23/2024  Impression: No evidence of acute cardiopulmonary disease. Electronically " Signed: Justin Bloom MD  4/23/2024 11:36 AM EDT  Workstation ID: [id number]         Assessment and Plan:     1. CAD with atypical chest pain  - history of large anterior MI 03/2023 s/p KAVON to mid LAD, normal LCx and RCA at the time  - on DAPT, BB, statin   - current episode is atypical for cardiac pain, HS troponins both normal at 11 with no delta, EKGs with no acute ischemic changes  - OK to discharge home from cardiac standpoint, likely pleurisy or MSK in origin.   - discussed chest pain precautions with the patient and his wife     2. ICM/HFrEF  - EF 31-35%, has been referred to EP and he is waiting for repeat echo 07/2024 to relook EF prior to proceeding with ICD  - on Jardiance, Lisinopril and BB. Previously intolerant to Entresto due to hypotension   - will add low dose Spironolactone 12.5mg daily for GDMT, instructed pt to stop it if it casuses significant side effects.     3. Anxiety  - followed by PCP       Scribed for Arsenio Ramos MD by Lizabeth Live PA-C. 4/23/2024  13:11 EDT    I,Arsenio Ramos M.D., personally performed the services described in this documentation as scribed by the above named individual in my presence, and it is both accurate and complete.    Arsenio Ramos MD, [facility], [facility]  04/23/24  14:47 EDT

## 2024-04-25 LAB
QT INTERVAL: 388 MS
QT INTERVAL: 422 MS
QTC INTERVAL: 409 MS
QTC INTERVAL: 448 MS

## 2024-04-29 LAB
QT INTERVAL: 396 MS
QTC INTERVAL: 427 MS

## 2024-05-28 RX ORDER — LISINOPRIL 5 MG/1
5 TABLET ORAL DAILY
Qty: 90 TABLET | Refills: 3 | Status: SHIPPED | OUTPATIENT
Start: 2024-05-28

## 2024-06-21 ENCOUNTER — OFFICE VISIT (OUTPATIENT)
Dept: INTERNAL MEDICINE | Facility: CLINIC | Age: 44
End: 2024-06-21
Payer: COMMERCIAL

## 2024-06-21 VITALS
HEIGHT: 72 IN | HEART RATE: 76 BPM | RESPIRATION RATE: 18 BRPM | WEIGHT: 204 LBS | DIASTOLIC BLOOD PRESSURE: 76 MMHG | BODY MASS INDEX: 27.63 KG/M2 | TEMPERATURE: 97.8 F | SYSTOLIC BLOOD PRESSURE: 110 MMHG

## 2024-06-21 DIAGNOSIS — R73.03 PREDIABETES: ICD-10-CM

## 2024-06-21 DIAGNOSIS — I10 ESSENTIAL HYPERTENSION: ICD-10-CM

## 2024-06-21 DIAGNOSIS — R06.83 SNORING: ICD-10-CM

## 2024-06-21 DIAGNOSIS — I25.10 CORONARY ARTERY DISEASE INVOLVING NATIVE CORONARY ARTERY OF NATIVE HEART WITHOUT ANGINA PECTORIS: ICD-10-CM

## 2024-06-21 DIAGNOSIS — E78.5 HYPERLIPIDEMIA LDL GOAL <70: ICD-10-CM

## 2024-06-21 DIAGNOSIS — I25.5 ISCHEMIC CARDIOMYOPATHY: ICD-10-CM

## 2024-06-21 DIAGNOSIS — Z00.00 ROUTINE HEALTH MAINTENANCE: Primary | ICD-10-CM

## 2024-06-21 DIAGNOSIS — R53.83 OTHER FATIGUE: ICD-10-CM

## 2024-06-21 LAB
ALBUMIN SERPL-MCNC: 4.5 G/DL (ref 3.5–5.2)
ALBUMIN/GLOB SERPL: 1.8 G/DL
ALP SERPL-CCNC: 51 U/L (ref 39–117)
ALT SERPL W P-5'-P-CCNC: 38 U/L (ref 1–41)
ANION GAP SERPL CALCULATED.3IONS-SCNC: 10.1 MMOL/L (ref 5–15)
AST SERPL-CCNC: 29 U/L (ref 1–40)
BILIRUB SERPL-MCNC: 1 MG/DL (ref 0–1.2)
BUN SERPL-MCNC: 18 MG/DL (ref 6–20)
BUN/CREAT SERPL: 17 (ref 7–25)
CALCIUM SPEC-SCNC: 9.4 MG/DL (ref 8.6–10.5)
CHLORIDE SERPL-SCNC: 103 MMOL/L (ref 98–107)
CO2 SERPL-SCNC: 26.9 MMOL/L (ref 22–29)
CREAT SERPL-MCNC: 1.06 MG/DL (ref 0.76–1.27)
EGFRCR SERPLBLD CKD-EPI 2021: 87.7 ML/MIN/1.73
FERRITIN SERPL-MCNC: 147 NG/ML (ref 30–400)
FOLATE SERPL-MCNC: 11.5 NG/ML (ref 4.78–24.2)
GLOBULIN UR ELPH-MCNC: 2.5 GM/DL
GLUCOSE SERPL-MCNC: 124 MG/DL (ref 65–99)
HBA1C MFR BLD: 6 % (ref 4.8–5.6)
IRON 24H UR-MRATE: 76 MCG/DL (ref 59–158)
IRON SATN MFR SERPL: 23 % (ref 20–50)
POTASSIUM SERPL-SCNC: 4 MMOL/L (ref 3.5–5.2)
PROT SERPL-MCNC: 7 G/DL (ref 6–8.5)
SODIUM SERPL-SCNC: 140 MMOL/L (ref 136–145)
TIBC SERPL-MCNC: 337 MCG/DL (ref 298–536)
TRANSFERRIN SERPL-MCNC: 226 MG/DL (ref 200–360)
TSH SERPL DL<=0.05 MIU/L-ACNC: 2.03 UIU/ML (ref 0.27–4.2)
VIT B12 BLD-MCNC: 627 PG/ML (ref 211–946)

## 2024-06-21 PROCEDURE — 99214 OFFICE O/P EST MOD 30 MIN: CPT | Performed by: STUDENT IN AN ORGANIZED HEALTH CARE EDUCATION/TRAINING PROGRAM

## 2024-06-21 PROCEDURE — 82746 ASSAY OF FOLIC ACID SERUM: CPT | Performed by: STUDENT IN AN ORGANIZED HEALTH CARE EDUCATION/TRAINING PROGRAM

## 2024-06-21 PROCEDURE — 83036 HEMOGLOBIN GLYCOSYLATED A1C: CPT | Performed by: STUDENT IN AN ORGANIZED HEALTH CARE EDUCATION/TRAINING PROGRAM

## 2024-06-21 PROCEDURE — 80053 COMPREHEN METABOLIC PANEL: CPT | Performed by: STUDENT IN AN ORGANIZED HEALTH CARE EDUCATION/TRAINING PROGRAM

## 2024-06-21 PROCEDURE — 84466 ASSAY OF TRANSFERRIN: CPT | Performed by: STUDENT IN AN ORGANIZED HEALTH CARE EDUCATION/TRAINING PROGRAM

## 2024-06-21 PROCEDURE — 84443 ASSAY THYROID STIM HORMONE: CPT | Performed by: STUDENT IN AN ORGANIZED HEALTH CARE EDUCATION/TRAINING PROGRAM

## 2024-06-21 PROCEDURE — 99396 PREV VISIT EST AGE 40-64: CPT | Performed by: STUDENT IN AN ORGANIZED HEALTH CARE EDUCATION/TRAINING PROGRAM

## 2024-06-21 PROCEDURE — 82728 ASSAY OF FERRITIN: CPT | Performed by: STUDENT IN AN ORGANIZED HEALTH CARE EDUCATION/TRAINING PROGRAM

## 2024-06-21 PROCEDURE — 83540 ASSAY OF IRON: CPT | Performed by: STUDENT IN AN ORGANIZED HEALTH CARE EDUCATION/TRAINING PROGRAM

## 2024-06-21 PROCEDURE — 82607 VITAMIN B-12: CPT | Performed by: STUDENT IN AN ORGANIZED HEALTH CARE EDUCATION/TRAINING PROGRAM

## 2024-06-21 RX ORDER — LORATADINE 10 MG/1
10 TABLET ORAL DAILY
COMMUNITY

## 2024-06-21 NOTE — PATIENT INSTRUCTIONS
Health Maintenance, Male  A healthy lifestyle and preventive care is important for your health and wellness. Ask your health care provider about what schedule of regular examinations is right for you.  What should I know about weight and diet?    Eat a Healthy Diet  Eat plenty of vegetables, fruits, whole grains, low-fat dairy products, and lean protein.  Do not eat a lot of foods high in solid fats, added sugars, or salt.     Maintain a Healthy Weight  Regular exercise can help you achieve or maintain a healthy weight. You should:  Do at least 150 minutes of exercise each week. The exercise should increase your heart rate and make you sweat (moderate-intensity exercise).  Do strength-training exercises at least twice a week.     Watch Your Levels of Cholesterol and Blood Lipids  Have your blood tested for lipids and cholesterol every 5 years starting at 35 years of age. If you are at high risk for heart disease, you should start having your blood tested when you are 20 years old. You may need to have your cholesterol levels checked more often if:  Your lipid or cholesterol levels are high.  You are older than 50 years of age.  You are at high risk for heart disease.     What should I know about cancer screening?  Many types of cancers can be detected early and may often be prevented.  Lung Cancer  You should be screened every year for lung cancer if:  You are a current smoker who has smoked for at least 30 years.  You are a former smoker who has quit within the past 15 years.  Talk to your health care provider about your screening options, when you should start screening, and how often you should be screened.     Colorectal Cancer  Routine colorectal cancer screening usually begins at 50 years of age and should be repeated every 5-10 years until you are 75 years old. You may need to be screened more often if early forms of precancerous polyps or small growths are found. Your health care provider may recommend  screening at an earlier age if you have risk factors for colon cancer.  Your health care provider may recommend using home test kits to check for hidden blood in the stool.  A small camera at the end of a tube can be used to examine your colon (sigmoidoscopy or colonoscopy). This checks for the earliest forms of colorectal cancer.     Prostate and Testicular Cancer  Depending on your age and overall health, your health care provider may do certain tests to screen for prostate and testicular cancer.  Talk to your health care provider about any symptoms or concerns you have about testicular or prostate cancer.     Skin Cancer  Check your skin from head to toe regularly.  Tell your health care provider about any new moles or changes in moles, especially if:  There is a change in a mole’s size, shape, or color.  You have a mole that is larger than a pencil eraser.  Always use sunscreen. Apply sunscreen liberally and repeat throughout the day.  Protect yourself by wearing long sleeves, pants, a wide-brimmed hat, and sunglasses when outside.     What should I know about heart disease, diabetes, and high blood pressure?  If you are 18-39 years of age, have your blood pressure checked every 3-5 years. If you are 40 years of age or older, have your blood pressure checked every year. You should have your blood pressure measured twice--once when you are at a hospital or clinic, and once when you are not at a hospital or clinic. Record the average of the two measurements. To check your blood pressure when you are not at a hospital or clinic, you can use:  An automated blood pressure machine at a pharmacy.  A home blood pressure monitor.  Talk to your health care provider about your target blood pressure.  If you are between 45-79 years old, ask your health care provider if you should take aspirin to prevent heart disease.  Have regular diabetes screenings by checking your fasting blood sugar level.  If you are at a normal  weight and have a low risk for diabetes, have this test once every three years after the age of 45.  If you are overweight and have a high risk for diabetes, consider being tested at a younger age or more often.  A one-time screening for abdominal aortic aneurysm (AAA) by ultrasound is recommended for men aged 65-75 years who are current or former smokers.  What should I know about preventing infection?  Hepatitis B  If you have a higher risk for hepatitis B, you should be screened for this virus. Talk with your health care provider to find out if you are at risk for hepatitis B infection.  Hepatitis C  Blood testing is recommended for:  Everyone born from 1945 through 1965.  Anyone with known risk factors for hepatitis C.     Sexually Transmitted Diseases (STDs)  You should be screened each year for STDs including gonorrhea and chlamydia if:  You are sexually active and are younger than 24 years of age.  You are older than 24 years of age and your health care provider tells you that you are at risk for this type of infection.  Your sexual activity has changed since you were last screened and you are at an increased risk for chlamydia or gonorrhea. Ask your health care provider if you are at risk.  Talk with your health care provider about whether you are at high risk of being infected with HIV. Your health care provider may recommend a prescription medicine to help prevent HIV infection.     What else can I do?    Schedule regular health, dental, and eye exams.  Stay current with your vaccines (immunizations).  Do not use any tobacco products, such as cigarettes, chewing tobacco, and e-cigarettes. If you need help quitting, ask your health care provider.  Limit alcohol intake to no more than 2 drinks per day. One drink equals 12 ounces of beer, 5 ounces of wine, or 1½ ounces of hard liquor.  Do not use street drugs.  Do not share needles.  Ask your health care provider for help if you need support or information  about quitting drugs.  Tell your health care provider if you often feel depressed.  Tell your health care provider if you have ever been abused or do not feel safe at home.      This information is not intended to replace advice given to you by your health care provider. Make sure you discuss any questions you have with your health care provider.  Document Released: 06/15/2009 Document Revised: 08/16/2017 Document Reviewed: 09/20/2016  [employer] Patient Education © 2018 [employer]    Healthy Eating  Following a healthy eating pattern may help you to achieve and maintain a healthy body weight, reduce the risk of chronic disease, and live a long and productive life. It is important to follow a healthy eating pattern at an appropriate calorie level for your body. Your nutritional needs should be met primarily through food by choosing a variety of nutrient-rich foods.  What are tips for following this plan?  Reading food labels  Read labels and choose the following:  Reduced or low sodium.  Juices with 100% fruit juice.  Foods with low saturated fats and high polyunsaturated and monounsaturated fats.  Foods with whole grains, such as whole wheat, cracked wheat, brown rice, and wild rice.  Whole grains that are fortified with folic acid. This is recommended for women who are pregnant or who want to become pregnant.  Read labels and avoid the following:  Foods with a lot of added sugars. These include foods that contain brown sugar, corn sweetener, corn syrup, dextrose, fructose, glucose, high-fructose corn syrup, honey, invert sugar, lactose, malt syrup, maltose, molasses, raw sugar, sucrose, trehalose, or turbinado sugar.  Do not eat more than the following amounts of added sugar per day:  6 teaspoons (25 g) for women.  9 teaspoons (38 g) for men.  Foods that contain processed or refined starches and grains.  Refined grain products, such as white flour, degermed cornmeal, white bread, and white  "rice.  Shopping  Choose nutrient-rich snacks, such as vegetables, whole fruits, and nuts. Avoid high-calorie and high-sugar snacks, such as potato chips, fruit snacks, and candy.  Use oil-based dressings and spreads on foods instead of solid fats such as butter, stick margarine, or cream cheese.  Limit pre-made sauces, mixes, and \"instant\" products such as flavored rice, instant noodles, and ready-made pasta.  Try more plant-protein sources, such as tofu, tempeh, black beans, edamame, lentils, nuts, and seeds.  Explore eating plans such as the Mediterranean diet or vegetarian diet.  Cooking  Use oil to sauté or stir-suarez foods instead of solid fats such as butter, stick margarine, or lard.  Try baking, boiling, grilling, or broiling instead of frying.  Remove the fatty part of meats before cooking.  Steam vegetables in water or broth.  Meal planning    At meals, imagine dividing your plate into fourths:  One-half of your plate is fruits and vegetables.  One-fourth of your plate is whole grains.  One-fourth of your plate is protein, especially lean meats, poultry, eggs, tofu, beans, or nuts.  Include low-fat dairy as part of your daily diet.     Lifestyle  Choose healthy options in all settings, including home, work, school, restaurants, or stores.  Prepare your food safely:  Wash your hands after handling raw meats.  Keep food preparation surfaces clean by regularly washing with hot, soapy water.  Keep raw meats separate from ready-to-eat foods, such as fruits and vegetables.  , meat, poultry, and eggs to the recommended internal temperature.  Store foods at safe temperatures. In general:  Keep cold foods at 40°F (4.4°C) or below.  Keep hot foods at 140°F (60°C) or above.  Keep your freezer at 0°F (-17.8°C) or below.  Foods are no longer safe to eat when they have been between the temperatures of 40°-140°F (4.4-60°C) for more than 2 hours.  What foods should I eat?  Fruits  Aim to eat 2 cup-equivalents of " fresh, canned (in natural juice), or frozen fruits each day. Examples of 1 cup-equivalent of fruit include 1 small apple, 8 large strawberries, 1 cup canned fruit, ½ cup dried fruit, or 1 cup 100% juice.  Vegetables  Aim to eat 2½-3 cup-equivalents of fresh and frozen vegetables each day, including different varieties and colors. Examples of 1 cup-equivalent of vegetables include 2 medium carrots, 2 cups raw, leafy greens, 1 cup chopped vegetable (raw or cooked), or 1 medium baked potato.  Grains  Aim to eat 6 ounce-equivalents of whole grains each day. Examples of 1 ounce-equivalent of grains include 1 slice of bread, 1 cup ready-to-eat cereal, 3 cups popcorn, or ½ cup cooked rice, pasta, or cereal.  Meats and other proteins  Aim to eat 5-6 ounce-equivalents of protein each day. Examples of 1 ounce-equivalent of protein include 1 egg, 1/2 cup nuts or seeds, or 1 tablespoon (16 g) peanut butter. A cut of meat or fish that is the size of a deck of cards is about 3-4 ounce-equivalents.  Of the protein you eat each week, try to have at least 8 ounces come from seafood. This includes salmon, trout, herring, and anchovies.  Dairy  Aim to eat 3 cup-equivalents of fat-free or low-fat dairy each day. Examples of 1 cup-equivalent of dairy include 1 cup (240 mL) milk, 8 ounces (250 g) yogurt, 1½ ounces (44 g) natural cheese, or 1 cup (240 mL) fortified soy milk.  Fats and oils  Aim for about 5 teaspoons (21 g) per day. Choose monounsaturated fats, such as canola and olive oils, avocados, peanut butter, and most nuts, or polyunsaturated fats, such as sunflower, corn, and soybean oils, walnuts, pine nuts, sesame seeds, sunflower seeds, and flaxseed.  Beverages  Aim for six 8-oz glasses of water per day. Limit coffee to three to five 8-oz cups per day.  Limit caffeinated beverages that have added calories, such as soda and energy drinks.  Limit alcohol intake to no more than 1 drink a day for nonpregnant women and 2 drinks a day  for men. One drink equals 12 oz of beer (355 mL), 5 oz of wine (148 mL), or 1½ oz of hard liquor (44 mL).  Seasoning and other foods  Avoid adding excess amounts of salt to your foods. Try flavoring foods with herbs and spices instead of salt.  Avoid adding sugar to foods.  Try using oil-based dressings, sauces, and spreads instead of solid fats.  This information is based on general [place] nutrition guidelines. For more information, visit choosemyplate.gov. Exact amounts may vary based on your nutrition needs.  Summary  A healthy eating plan may help you to maintain a healthy weight, reduce the risk of chronic diseases, and stay active throughout your life.  Plan your meals. Make sure you eat the right portions of a variety of nutrient-rich foods.  Try baking, boiling, grilling, or broiling instead of frying.  Choose healthy options in all settings, including home, work, school, restaurants, or stores.  This information is not intended to replace advice given to you by your health care provider. Make sure you discuss any questions you have with your health care provider.  Document Revised: 04/01/2019 Document Reviewed: 04/01/2019  [employer] Patient Education © 2021 [employer]    Exercising to Stay Healthy  To become healthy and stay healthy, it is recommended that you do moderate-intensity and vigorous-intensity exercise. You can tell that you are exercising at a moderate intensity if your heart starts beating faster and you start breathing faster but can still hold a conversation. You can tell that you are exercising at a vigorous intensity if you are breathing much harder and faster and cannot hold a conversation while exercising.  Exercising regularly is important. It has many health benefits, such as:  Improving overall fitness, flexibility, and endurance.  Increasing bone density.  Helping with weight control.  Decreasing body fat.  Increasing muscle strength.  Reducing stress and tension.  Improving overall  health.  How often should I exercise?  Choose an activity that you enjoy, and set realistic goals. Your health care provider can help you make an activity plan that works for you.  Exercise regularly as told by your health care provider. This may include:  Doing strength training two times a week, such as:  Lifting weights.  Using resistance bands.  Push-ups.  Sit-ups.  Yoga.  Doing a certain intensity of exercise for a given amount of time. Choose from these options:  A total of 150 minutes of moderate-intensity exercise every week.  A total of 75 minutes of vigorous-intensity exercise every week.  A mix of moderate-intensity and vigorous-intensity exercise every week.  Children, pregnant women, people who have not exercised regularly, people who are overweight, and older adults may need to talk with a health care provider about what activities are safe to do. If you have a medical condition, be sure to talk with your health care provider before you start a new exercise program.  What are some exercise ideas?    Moderate-intensity exercise ideas include:  Walking 1 mile (1.6 km) in about 15 minutes.  Biking.  Hiking.  Golfing.  Dancing.  Water aerobics.  Vigorous-intensity exercise ideas include:  Walking 4.5 miles (7.2 km) or more in about 1 hour.  Jogging or running 5 miles (8 km) in about 1 hour.  Biking 10 miles (16.1 km) or more in about 1 hour.  Lap swimming.  Roller-skating or in-line skating.  Cross-country skiing.  Vigorous competitive sports, such as football, basketball, and soccer.  Jumping rope.  Aerobic dancing.  What are some everyday activities that can help me to get exercise?  Yard work, such as:  Pushing a .  Raking and bagging leaves.  Washing your car.  Pushing a stroller.  Shoveling snow.  Gardening.  Washing windows or floors.  How can I be more active in my day-to-day activities?  Use stairs instead of an elevator.  Take a walk during your lunch break.  If you drive, park your car  farther away from your work or school.  If you take public transportation, get off one stop early and walk the rest of the way.  Stand up or walk around during all of your indoor phone calls.  Get up, stretch, and walk around every 30 minutes throughout the day.  Enjoy exercise with a friend. Support to continue exercising will help you keep a regular routine of activity.  What guidelines can I follow while exercising?  Before you start a new exercise program, talk with your health care provider.  Do not exercise so much that you hurt yourself, feel dizzy, or get very short of breath.  Wear comfortable clothes and wear shoes with good support.  Drink plenty of water while you exercise to prevent dehydration or heat stroke.  Work out until your breathing and your heartbeat get faster.  Where to find more information  [place] Department of Health and Human Services: www.hhs.gov  Centers for Disease Control and Prevention (CDC): www.cdc.gov  Summary  Exercising regularly is important. It will improve your overall fitness, flexibility, and endurance.  Regular exercise also will improve your overall health. It can help you control your weight, reduce stress, and improve your bone density.  Do not exercise so much that you hurt yourself, feel dizzy, or get very short of breath.  Before you start a new exercise program, talk with your health care provider.  This information is not intended to replace advice given to you by your health care provider. Make sure you discuss any questions you have with your health care provider.  Document Revised: 11/30/2018 Document Reviewed: 11/08/2018  [employer] Patient Education © 2021 [employer]

## 2024-06-21 NOTE — ASSESSMENT & PLAN NOTE
Chronic, improving with treatment.  Patient currently on SGLT2 inhibitor for heart failure with reduced ejection fraction.  Suspect this is likely improved his A1c significantly.  Will repeat today.

## 2024-06-21 NOTE — PROGRESS NOTES
Male Physical Note      Date: 2024   Patient Name: Devon Smith  : 1980   MRN: [id number]     Chief Complaint:    Chief Complaint   Patient presents with    Annual Exam    Fatigue       History of Present Illness: Devon Smith is a 44 y.o. male who is here today for their annual health maintenance and physical.    HPI  History of Present Illness  The patient presents for an annual check-up.    The patient reports experiencing significant fatigue, necessitating three naps on weekends. This fatigue has been a persistent issue since his myocardial infarction, but recently, it has intensified. He attributes his lack of concern regarding  this to his Lexapro medication, which he finds beneficial for his mood, and is not interested in changing. His sleep schedule is from 8:30 PM-11:30pm bedtime to 7:30 AM, with occasional wakefulness lasting 3 to 4 hours at initiation of sleep. He has observed a correlation between his morning medication intake and the fatigue. His current morning medications include aspirin, Jardiance, Brilinta, rosuvastatin, and spironolactone. He notes his fatigue worsened after adding spironolactone. His wife and daughter have reported that he snores loudly. His weight has increased from 170 pounds last year to 204 pounds. A previous study conducted at [facility] in childhood indicated iron deficiency.     He is scheduled for a repeat echocardiogram next month, which he plans to cancel due to financial constraints. He declined the placement of an ICD due to no signs of cardiac arrhythmias. He denies experiencing edema, dyspnea, or chest pain since his emergency room visit in 2024. He experienced a sensation akin to something moving through his chest, which resolved after 3 to 4 minutes. No chest pain since. He has not had an eye examination recently, with his last eye examination occurring approximately 2 years ago.     He maintains regular dental check-ups every 3  months. His physical activity is limited, and he believes his fatigue is directly related to his diet. He previously followed a ketogenic diet three years prior to his myocardial infarction, which he found beneficial. He has reduced his red meat intake to approximately five times this year. He used to walk his dog a mile daily during his cardiac rehabilitation, but recently, he has been unable to do so. His work remains active.   He denies any changes to his family history or past medical history since his last visit.    Was seen in ER on 4/23/24, personally reivewed note by Dr. Juan Gamez MD. Presented with right sided chest pain. Had negative troponin x2. Cardiology evaluated in [place] and patient was discharged home. Personally reviewed note dated 4/23/24 by DR. Arsenio Ramos of cardiology, added low dose spironolactone 12.5mg daily for GDMT.    - personally reviewed ntoe by Jeremy GOOD of cardiology dated 3/26/24. AT that time patient deferred ICD placement. Has repeat echo 7/2/24 and f/u 7/25/24    Subjective      Review of Systems:   Review of Systems    Past Medical History, Social History, Family History and Care Team were all reviewed with patient and updated as appropriate.     Medications:     Current Outpatient Medications:     aspirin 81 MG EC tablet, Take 1 tablet by mouth Daily., Disp: 90 tablet, Rfl: 3    clotrimazole (LOTRIMIN) 1 % cream, Apply 1 Application topically to the appropriate area as directed As Needed., Disp: , Rfl:     empagliflozin (Jardiance) 10 MG tablet tablet, Take 1 tablet by mouth Daily., Disp: 90 tablet, Rfl: 3    escitalopram (LEXAPRO) 10 MG tablet, Take 1 tablet by mouth Daily., Disp: 90 tablet, Rfl: 3    lisinopril (PRINIVIL,ZESTRIL) 5 MG tablet, Take 1 tablet by mouth once daily, Disp: 90 tablet, Rfl: 3    loratadine (Claritin) 10 MG tablet, Take 1 tablet by mouth Daily., Disp: , Rfl:     metoprolol succinate XL (TOPROL-XL) 25 MG 24 hr tablet, Take 1 tablet by mouth  Daily., Disp: 90 tablet, Rfl: 3    nitroglycerin (NITROSTAT) 0.4 MG SL tablet, Place 1 tablet under the tongue Every 5 (Five) Minutes As Needed for Chest Pain. Take no more than 3 doses in 15 minutes., Disp: 25 tablet, Rfl: 12    rosuvastatin (CRESTOR) 40 MG tablet, TAKE 1 TABLET BY MOUTH ONCE DAILY AT NIGHT, Disp: 90 tablet, Rfl: 3    spironolactone (ALDACTONE) 25 MG tablet, Take 0.5 tablets by mouth Daily., Disp: 30 tablet, Rfl: 5    ticagrelor (Brilinta) 90 MG tablet tablet, Take 1 tablet by mouth 2 (Two) Times a Day., Disp: 180 tablet, Rfl: 3    valACYclovir (VALTREX) 1000 MG tablet, Take 1 tablet by mouth As Needed (flareup)., Disp: , Rfl:     Allergies:   No Known Allergies    Immunizations:  Td/Tdap(Booster Q 10 yrs):  UTD  Flu (Yearly):  Recommend annually in fall  Pneumonia: UTD  Immunization History   Administered Date(s) Administered    COVID-19 (PFIZER) Purple Cap Monovalent 01/29/2021, 02/23/2021, 11/04/2021    Flu Vaccine Quad PF >36MO 01/29/2018    Flublok 18+yrs 11/04/2021    Fluzone (or Fluarix & Flulaval for VFC) >6mos 01/29/2018, 11/02/2018, 12/02/2022, 01/23/2024    Fluzone Quad >6mos (Multi-dose) 10/01/2019, 01/01/2022    Influenza Injectable Mdck Pf Quad 12/02/2022    Pneumococcal Conjugate 20-Valent (PCV20) 06/21/2023    Tdap 06/21/2023        Colorectal Screening:   UTD, neg cologuard   Last Completed Colonoscopy            COLORECTAL CANCER SCREENING (COLOGUARD - Every 3 Years) Next due on 4/10/2026      04/10/2023  Cologuard component of Cologuard - Stool, Per Rectum                      Hep C (Age 18-79 once):  neg  HIV (Age 15-65 once) : neg      A1c: due  Lipid panel: due  The ASCVD Risk score (Liseth GALINDO, et al., 2019) failed to calculate.    Ophthalmologist: due  Dentist: regularly    Tobacco Use: Low Risk  (6/21/2024)    Patient History     Smoking Tobacco Use: Never     Smokeless Tobacco Use: Never     Passive Exposure: Never       Social History     Substance and Sexual Activity  "  Alcohol Use Never        Social History     Substance and Sexual Activity   Drug Use Never        Diet/Physical activity:   Well balanced, mostly lean meats and vegetables. Minimal red meat. Exercising occasionally      PHQ-2 Depression Screening  PHQ-9 Total Score: 0        Objective     Physical Exam:  Vital Signs:   Vitals:    06/21/24 0833   BP: 110/76   BP Location: Left arm   Patient Position: Sitting   Cuff Size: Adult   Pulse: 76   Resp: 18   Temp: 97.8 °F (36.6 °C)   TempSrc: Infrared   Weight: 92.5 kg (204 lb)   Height: 182.9 cm (72\")     Body mass index is 27.67 kg/m².     Physical Exam  Vitals reviewed.   Constitutional:       General: He is not in acute distress.     Appearance: Normal appearance. He is not ill-appearing or toxic-appearing.   HENT:      Head: Normocephalic and atraumatic.      Right Ear: External ear normal.      Left Ear: External ear normal.      Nose: Nose normal. No congestion.      Mouth/Throat:      Mouth: Mucous membranes are moist.      Pharynx: No oropharyngeal exudate or posterior oropharyngeal erythema.   Eyes:      General: No scleral icterus.        Right eye: No discharge.         Left eye: No discharge.      Extraocular Movements: Extraocular movements intact.      Pupils: Pupils are equal, round, and reactive to light.   Cardiovascular:      Rate and Rhythm: Normal rate and regular rhythm.      Pulses: Normal pulses.      Heart sounds: Normal heart sounds.   Pulmonary:      Effort: Pulmonary effort is normal. No respiratory distress.      Breath sounds: Normal breath sounds.   Abdominal:      General: Abdomen is flat. Bowel sounds are normal. There is no distension.      Palpations: Abdomen is soft. There is no mass.      Tenderness: There is no abdominal tenderness. There is no guarding or rebound.   Musculoskeletal:         General: No swelling or deformity. Normal range of motion.      Cervical back: Normal range of motion and neck supple. No rigidity or tenderness. "   Lymphadenopathy:      Cervical: No cervical adenopathy.   Skin:     General: Skin is warm and dry.      Capillary Refill: Capillary refill takes less than 2 seconds.      Coloration: Skin is not jaundiced or pale.   Neurological:      General: No focal deficit present.      Mental Status: He is alert and oriented to person, place, and time. Mental status is at baseline.   Psychiatric:         Mood and Affect: Mood normal.         Behavior: Behavior normal.         Judgment: Judgment normal.       Physical Exam        Procedures  Results        Assessment / Plan      Assessment/Plan:   Problem List Items Addressed This Visit       Hyperlipidemia LDL goal <70    Current Assessment & Plan     Chronic, improving.  Recent LDL at goal continue Crestor 40 mg daily         Essential hypertension    Current Assessment & Plan     Chronic, well-controlled.  Continue medications as prescribed by cardiology.         Coronary artery disease involving native coronary artery of native heart without angina pectoris    Overview     3/1/2023: Anterior STEMI, mid LAD 10 percent thrombotic occlusion status post OCT guided PCI with a 4.0 x 38 mm Xience KAVON, proximal LAD 30-40%, normal circumflex and RCA, LVEF 25-30% due to anterior apical and distal inferior akinesis, EDP 36 mmHg         Prediabetes    Current Assessment & Plan     Chronic, improving with treatment.  Patient currently on SGLT2 inhibitor for heart failure with reduced ejection fraction.  Suspect this is likely improved his A1c significantly.  Will repeat today.         Relevant Orders    Hemoglobin A1c    Ischemic cardiomyopathy     Other Visit Diagnoses       Routine health maintenance    -  Primary    Relevant Orders    Comprehensive metabolic panel    Other fatigue        Relevant Orders    Iron and TIBC    Ferritin    Vitamin B12    Folate    TSH Rfx On Abnormal To Free T4    Snoring        Relevant Orders    Ambulatory Referral to Sleep Medicine          Assessment  & Plan  1. Annual check-up.  2.  Fatigue  The patient's blood pressure readings are within the normal range today. Laboratory tests will be conducted to assess his iron, B12, folate, and thyroid levels for evaluation of fatigue. A referral to a sleep medicine specialist will be made for a sleep study, given his significant snoring, history of myocardial infarction, and heart failure with reduced ejection fraction. Untreated sleep apnea can potentially lead to cardiac arrhythmias, which he is at a slightly higher risk for due to decreased ejection fraction. He is current with his tetanus and pneumonia vaccines. An annual influenza vaccine is recommended in the fall. A Cologuard test was normal last year, and he will be due for another in 3 years. An annual eye examination is recommended. He is advised to gradually increase his physical activity, including walking for 5 to 10 minutes and extending up to goal of 30 minutes 5 days weekly. He is scheduled to consult with Dr. Ramos next month, during which he will discuss potential medication adjustments as his fatigue only appeared after starting spironolactone. Should he experience persistent chest pain, shortness of breath, dizziness, palpitations, or tachycardia, he is advised to seek immediate medical attention or consult with his cardiologist.    Follow-up  The patient is scheduled for a follow-up visit in 6 months, or sooner if necessary.      Healthcare Maintenance:  Counseling provided based on age appropriate USPSTF guidelines.  BMI is >= 25 and <30. (Overweight) The following options were offered after discussion;: exercise counseling/recommendations and nutrition counseling/recommendations    Devon Smith voices understanding and acceptance of this advice and will call back with any further questions or concerns. AVS with preventive healthcare tips printed for patient.     “Discussed risks/benefits to vaccination, reviewed components of the vaccine,  discussed VIS, discussed informed consent, informed consent obtained. Patient/Parent was allowed to accept or refuse vaccine. Questions answered to satisfactory state of patient/Parent. We reviewed typical age appropriate and seasonally appropriate vaccinations. Reviewed immunization history and updated state vaccination form as needed. Patient was counseled on Influenza    Follow Up:   Return in about 6 months (around 12/21/2024) for Recheck lexapro, fatigue..      Galo Borges MD   [facility]    Patient or patient representative verbalized consent for the use of Ambient Listening during the visit with  Galo Borges MD for chart documentation. 6/21/2024  09:03 EDT

## 2024-06-26 RX ORDER — METOPROLOL SUCCINATE 25 MG/1
25 TABLET, EXTENDED RELEASE ORAL DAILY
Qty: 30 TABLET | Refills: 0 | Status: SHIPPED | OUTPATIENT
Start: 2024-06-26

## 2024-07-22 RX ORDER — EMPAGLIFLOZIN 10 MG/1
10 TABLET, FILM COATED ORAL DAILY
Qty: 90 TABLET | Refills: 3 | Status: SHIPPED | OUTPATIENT
Start: 2024-07-22

## 2024-07-24 ENCOUNTER — OFFICE VISIT (OUTPATIENT)
Dept: CARDIOLOGY | Facility: CLINIC | Age: 44
End: 2024-07-24
Payer: COMMERCIAL

## 2024-07-24 VITALS
WEIGHT: 202.6 LBS | DIASTOLIC BLOOD PRESSURE: 68 MMHG | HEART RATE: 71 BPM | BODY MASS INDEX: 26.85 KG/M2 | OXYGEN SATURATION: 98 % | SYSTOLIC BLOOD PRESSURE: 106 MMHG | HEIGHT: 73 IN

## 2024-07-24 DIAGNOSIS — I25.10 CORONARY ARTERY DISEASE INVOLVING NATIVE CORONARY ARTERY OF NATIVE HEART WITHOUT ANGINA PECTORIS: Primary | ICD-10-CM

## 2024-07-24 DIAGNOSIS — E78.5 HYPERLIPIDEMIA LDL GOAL <70: ICD-10-CM

## 2024-07-24 PROCEDURE — 99214 OFFICE O/P EST MOD 30 MIN: CPT | Performed by: INTERNAL MEDICINE

## 2024-07-24 NOTE — PROGRESS NOTES
OFFICE VISIT  NOTE  [facility] CARDIOLOGY      Name: Devon Smith    Date: 2024  MRN:  [id number]  :  1980      REFERRING/PRIMARY PROVIDER:  Galo Borges MD     Chief Complaint   Patient presents with    STEMI involving left anterior descending coronary artery       HPI: Devon Smith is a 44 y.o. male who presents today for follow up for ICM, anterior STEMI 2023. History of hyperlipidemia, presented with substernal chest pain/pressure with radiation to bilateral arms and jaws. Emergent cath 3/1/23 with KAVON to 100% occluded LAD.  Echo showed EF less than 20%. Discharged with Lifevest in place, intolerant to Entresto due to hypotension, but he is on Toprol, Jardiance and Lisinopril 5mg. Participating in cardiac rehab 3 times a week, and denies any chest pain or unusual dyspnea while exercising on treadmill or bike. Repeat echo 2023 with EF 26-30%, he was referred to EP for ICD Implant, however he wanted to wait a little longer and repeat another echo. This showed EF 31-35% on 10/6/23.   Patient has decided not to pursue ICD as he is doing well.  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  Reports fatigue, needs to take a nap most days but does not, has a sleep studies 2023.      ROS:Pertinent positives as listed in the HPI.  All other systems reviewed and negative.    Past Medical History:   Diagnosis Date    Allergic 76    Hyperlipidemia 2022    MRSA cellulitis     Myocardial infarction 3/1/2023       Past Surgical History:   Procedure Laterality Date    CARDIAC CATHETERIZATION N/A 2023    Procedure: Left Heart Cath;  Surgeon: Arsenio Ramos MD;  Location:  [facility];  Service: Cardiovascular;  Laterality: N/A;    CARDIAC CATHETERIZATION N/A 2023    Procedure: Optical Coherent Tomography;  Surgeon: Arsenio Ramos MD;  Location:  [facility];   "Service: Cardiovascular;  Laterality: N/A;    CARDIAC CATHETERIZATION N/A 03/01/2023    Procedure: Percutaneous Coronary Intervention;  Surgeon: Arsenio Ramos MD;  Location: [facility] INVASIVE LOCATION;  Service: Cardiovascular;  Laterality: N/A;    CORONARY STENT PLACEMENT  3/1/2023    INCISION AND DRAINAGE ABSCESS  2013       Social History     Socioeconomic History    Marital status:    Tobacco Use    Smoking status: Never     Passive exposure: Never    Smokeless tobacco: Never   Vaping Use    Vaping status: Never Used   Substance and Sexual Activity    Alcohol use: Never    Drug use: Never    Sexual activity: Not Currently     Partners: Female       Family History   Problem Relation Age of Onset    Migraine headaches Mother     Heart attack Father 67        CABG    Heart disease Maternal Grandmother         heart failure    Diabetes Maternal Grandmother     Heart attack Paternal Grandfather         CAD    Colon cancer Neg Hx     Prostate cancer Neg Hx         No Known Allergies    Current Outpatient Medications   Medication Instructions    aspirin 81 mg, Oral, Daily    clotrimazole (LOTRIMIN) 1 % cream 1 application , Topical, As Needed    escitalopram (LEXAPRO) 10 mg, Oral, Daily    Jardiance 10 mg, Oral, Daily    lisinopril (PRINIVIL,ZESTRIL) 5 mg, Oral, Daily    loratadine (CLARITIN) 10 mg, Oral, Daily    metoprolol succinate XL (TOPROL-XL) 25 mg, Oral, Daily    nitroglycerin (NITROSTAT) 0.4 mg, Sublingual, Every 5 Minutes PRN, Take no more than 3 doses in 15 minutes.    rosuvastatin (CRESTOR) 40 mg, Oral, Nightly    spironolactone (ALDACTONE) 12.5 mg, Oral, Daily    valACYclovir (VALTREX) 1,000 mg, Oral, As Needed       Vitals:    07/24/24 1443   BP: 106/68   BP Location: Right arm   Patient Position: Sitting   Cuff Size: Adult   Pulse: 71   SpO2: 98%   Weight: 91.9 kg (202 lb 9.6 oz)   Height: 185.4 cm (73\")     Body mass index is 26.73 kg/m².    PHYSICAL EXAM:    General Appearance:   well " "developed  well nourished  Neck:  thyroid not enlarged  supple  Respiratory:  no respiratory distress  normal breath sounds  no rales  Cardiovascular:  no jugular venous distention  regular rhythm  apical impulse normal  S1 normal, S2 normal  no S3, no S4   no murmur  no rub, no thrill  carotid pulses normal; no bruit  pedal pulses normal  lower extremity edema: none    Skin:   warm, dry    RESULTS:   Procedures    Results for orders placed during the hospital encounter of 10/06/23    Adult Transthoracic Echo Complete W/ Cont if Necessary Per Protocol    Interpretation Summary    Left ventricular systolic function is moderately decreased. Calculated left ventricular EF = 33.5% Left ventricular ejection fraction appears to be 31 - 35%.    The left ventricular cavity is mildly dilated.    The following left ventricular wall segments are akinetic: apical anterior, apical lateral, apical inferior, apical septal and apex.    Left ventricular diastolic function is consistent with (grade II w/high LAP) pseudonormalization.    Estimated right ventricular systolic pressure from tricuspid regurgitation is normal (<35 mmHg). Calculated right ventricular systolic pressure from tricuspid regurgitation is 8 mmHg.    Improved EF compared to 3/2023 echo        Labs:  Lab Results   Component Value Date    CHOL 127 03/29/2024    TRIG 75 03/29/2024    HDL 53 03/29/2024    LDL 59 03/29/2024    AST 29 06/21/2024    ALT 38 06/21/2024     Lab Results   Component Value Date    HGBA1C 6.00 (H) 06/21/2024     Creatinine   Date Value Ref Range Status   06/21/2024 1.06 0.76 - 1.27 mg/dL Final   04/23/2024 0.76 0.76 - 1.27 mg/dL Final   03/29/2024 1.08 0.76 - 1.27 mg/dL Final   03/01/2023 0.90 0.60 - 1.30 mg/dL Final     Comment:     Serial Number: [id number]:  [id number]     No results found for: \"EGFRIFNONA\"      ASSESSMENT:  Problem List Items Addressed This Visit       Hyperlipidemia LDL goal <70    Coronary artery disease involving native " coronary artery of native heart without angina pectoris - Primary    Overview     3/1/2023: Anterior STEMI, mid % status post OCT guided PCI with a 4.0 x 38 mm Xience KAVON, proximal LAD 30-40%, normal circumflex and RCA, LVEF 25-30% due to anterior apical and distal inferior akinesis, EDP 36 mmHg            PLAN:  1.  CAD with anterior STEMI 3/2023:  [facility] 3/1 with 100% mid LAD thrombotic occlusion s/p KAVON, normal RCA and LCx  Continue ASA, BB, statin   Active in cardiac rehab without any angina or anginal equivalent    Discontinue Brilinta as he completed over 1 year post MI     2.  Ischemic cardiomyopathy/ Systolic heart failure   Could not tolerate Entresto due to hypotension  On spironolactone, Jardiance, Toprol and Lisinopril, continue all fo now.     Referred to Dr. Styles for ICD discussion, but patient has decided against it.     3.  Mixed hyperlipidemia:   prior to MI  LDL improved to 50, continue Crestor 40mg   Continue rosuvastatin excellent lipids most recently    4.  Fatigue, daytime somnolence:  Agree with sleep study.      Follow-up   Return in about 9 months (around 4/24/2025).    Arsenio Ramos MD, [facility], [facility]  Interventional Cardiology  07/24/24  15:02 EDT

## 2024-08-08 RX ORDER — METOPROLOL SUCCINATE 25 MG/1
25 TABLET, EXTENDED RELEASE ORAL DAILY
Qty: 30 TABLET | Refills: 0 | Status: SHIPPED | OUTPATIENT
Start: 2024-08-08

## 2024-09-09 RX ORDER — METOPROLOL SUCCINATE 25 MG/1
25 TABLET, EXTENDED RELEASE ORAL DAILY
Qty: 90 TABLET | Refills: 3 | Status: SHIPPED | OUTPATIENT
Start: 2024-09-09

## 2024-09-26 NOTE — PROGRESS NOTES
Chief Complaint  Snoring, Daytime Sleepiness, Fatigue, and Frequent Awakenings    Subjective     History of Present Illness:  Devon Smith is a 44 y.o. male with myocardial infarction, heart failure (NYHA class II), and hyperlipidemia.  The patient is referred by Galo Borges MD with primary care.  Patient has had difficulty with snoring and daytime fatigue.  Review of self-reported questionnaire notes symptoms including the aforementioned snoring, and fatigue as well as frequent awakening, grinding teeth, pain at night, lack of concentration, and heart failure.  Patient reports symptoms have been ongoing for 2-3 years.  He typically goes to bed at 9:30 PM waking at 7 AM on weekdays and weekends.  He does take 1 hour naps.  Patient denies use of tobacco, alcohol, or illicit/recreational drugs.  He drinks 2-3 vivienne daily.  The patient's significant other reports witnessed episodes of heavy snoring which occur nightly and in all sleeping positions.    Further details are as follows:    [place] Scale is (out of 24): Total score: 10     Estimated average amount of sleep per night: 9.5 hours  Number of times he wakes up at night: 4 times   Difficulty falling back asleep: occasionally  It usually takes 30-3 hours to go to sleep.  He feels sleepy upon waking up: occasionally  Rotating or night shift work: no    Drowsiness/Sleepiness:  He exhibits the following:  excessive daytime sleepiness  excessive daytime fatigue  takes scheduled naps during the day    Snoring/Breathing:  He exhibits the following:  loud snoring, snores in all sleep positions, and quits breathing at night    Head Injury:  He exhibits the following:  No    Reflux:  He describes the following:  Occasionally when eats before bed    Narcolepsy:  He exhibits the following:  none    RLS/PLMs:  He describes the following:  none    Insomnia:  He describes the following:  problems initiating sleep at night  frequent awakenings  bothered by pain at  night    Parasomnia:  He exhibits the following:  None now    Weight:       10/01/24  1054   Weight: 90.4 kg (199 lb 3.2 oz)      Weight change in the last year:  gain: 20-30 lbs    The patient's relevant past medical, surgical, family, and social history reviewed and updated in Epic as appropriate.    Review of Systems   Constitutional: Negative.    HENT:  Positive for postnasal drip.    Eyes: Negative.    Respiratory: Negative.     Cardiovascular: Negative.    Gastrointestinal: Negative.    Endocrine: Negative.    Genitourinary: Negative.    Musculoskeletal: Negative.    Skin: Negative.    Allergic/Immunologic: Positive for environmental allergies.   Neurological: Negative.    Hematological: Negative.    Psychiatric/Behavioral: Negative.     All other systems reviewed and are negative.      PMH:    Past Medical History:   Diagnosis Date    Allergic 1/25/78    Hyperlipidemia 4/2022    MRSA cellulitis     Myocardial infarction 3/1/2023     Past Surgical History:   Procedure Laterality Date    CARDIAC CATHETERIZATION N/A 03/01/2023    Procedure: Left Heart Cath;  Surgeon: Arsenio Ramos MD;  [facility];  Service: Cardiovascular;  Laterality: N/A;    CARDIAC CATHETERIZATION N/A 03/01/2023    Procedure: Optical Coherent Tomography;  Surgeon: Arsenio Ramos MD;  Location:  [facility];  Service: Cardiovascular;  Laterality: N/A;    CARDIAC CATHETERIZATION N/A 03/01/2023    Procedure: Percutaneous Coronary Intervention;  Surgeon: Arsenio Ramos MD;  Location:  [facility];  Service: Cardiovascular;  Laterality: N/A;    CORONARY STENT PLACEMENT  3/1/2023    INCISION AND DRAINAGE ABSCESS  2013       No Known Allergies    MEDS:  Prior to Admission medications    Medication Sig Start Date End Date Taking? Authorizing Provider   aspirin 81 MG EC tablet Take 1 tablet by mouth Daily. 3/4/23   Arsenio Ramos MD   clotrimazole (LOTRIMIN) 1 % cream Apply 1  "Application topically to the appropriate area as directed As Needed. 7/13/23   ProviderManoj MD   empagliflozin (Jardiance) 10 MG tablet tablet Take 1 tablet by mouth once daily 7/22/24   Arsenio Ramos MD   escitalopram (LEXAPRO) 10 MG tablet Take 1 tablet by mouth Daily. 12/14/23   Galo Borges MD   lisinopril (PRINIVIL,ZESTRIL) 5 MG tablet Take 1 tablet by mouth once daily 5/28/24   Arsenio Ramos MD   loratadine (Claritin) 10 MG tablet Take 1 tablet by mouth Daily.    Provider, MD Manoj   metoprolol succinate XL (TOPROL-XL) 25 MG 24 hr tablet Take 1 tablet by mouth once daily 9/9/24   Arsenio Ramos MD   nitroglycerin (NITROSTAT) 0.4 MG SL tablet Place 1 tablet under the tongue Every 5 (Five) Minutes As Needed for Chest Pain. Take no more than 3 doses in 15 minutes. 3/3/23   Arsenio Ramos MD   rosuvastatin (CRESTOR) 40 MG tablet TAKE 1 TABLET BY MOUTH ONCE DAILY AT NIGHT 2/26/24   Arsenio Ramos MD   spironolactone (ALDACTONE) 25 MG tablet Take 0.5 tablets by mouth Daily. 4/23/24   Arsenio Ramos MD   valACYclovir (VALTREX) 1000 MG tablet Take 1 tablet by mouth As Needed (flareup). 3/27/24   Jeremy Wetzel PA-C       FH:  Family History   Problem Relation Age of Onset    Migraine headaches Mother     Heart attack Father 67        CABG    Heart disease Maternal Grandmother         heart failure    Diabetes Maternal Grandmother     Heart attack Paternal Grandfather         CAD    Colon cancer Neg Hx     Prostate cancer Neg Hx        Objective   Vital Signs:  /70 (BP Location: Left arm, Patient Position: Sitting, Cuff Size: Adult)   Pulse 72   Temp 97.1 °F (36.2 °C) (Temporal)   Ht 185.4 cm (72.99\")   Wt 90.4 kg (199 lb 3.2 oz)   SpO2 98%   BMI 26.29 kg/m²     Patient's (Body mass index is 26.29 kg/m².) indicates that they are overweight (BMI 25-29.9) with health related conditions that include obstructive sleep apnea, hypertension, coronary heart disease, and " diabetes mellitus . Weight is unchanged. BMI is is above average; BMI management plan is completed. We discussed portion control and increasing exercise. He has been to cardiac rehab.            Physical Exam  Vitals reviewed.   Constitutional:       Appearance: Normal appearance.   HENT:      Head: Normocephalic and atraumatic.      Nose: Nose normal.      Mouth/Throat:      Mouth: Mucous membranes are moist.   Cardiovascular:      Rate and Rhythm: Normal rate and regular rhythm.      Heart sounds: No murmur heard.     No friction rub. No gallop.   Pulmonary:      Effort: Pulmonary effort is normal. No respiratory distress.      Breath sounds: Normal breath sounds. No wheezing or rhonchi.   Neurological:      Mental Status: He is alert and oriented to person, place, and time.   Psychiatric:         Behavior: Behavior normal.         Mallampati Score: IV (only hard palate visible)    Result Review :              Assessment and Plan  Devon Smith is a 44 y.o. male with myocardial infarction, heart failure (NYHA class II), and hyperlipidemia who presents for further evaluation of excessive daytime sleepiness and fatigue, nonrestorative sleep, and concerns for sleep disordered breathing and obstructive sleep apnea. The patient's symptoms, particularly heavy snoring, are concerning for significant sleep disordered breathing and obstructive sleep apnea. We will obtain a home sleep test for further evaluation.  The patient will return for follow-up and recommendations after test.  I have discussed weight loss as it pertains to obstructive sleep apnea.    Diagnoses and all orders for this visit:    1. Sleep apnea, unspecified type (Primary)  -     Home Sleep Study; Future    2. Snoring  -     Home Sleep Study; Future    3. Excessive daytime sleepiness  -     Home Sleep Study; Future    4. Overweight with body mass index (BMI) 25.0-29.9                 I discussed the consequences of uncontrolled sleep apnea  including hypertension, heart disease, diabetes, stroke, and dementia. I further discussed sleep apnea therapeutic options including CPAP, Weight loss, Oral dental appliance, and surgery.         Follow Up  Return for Follow up after study.  Patient was given instructions and counseling regarding his condition or for health maintenance advice. Please see specific information pulled into the AVS if appropriate.     BECK Thao, ACNP-BC  Pulmonology, Critical Care, and Sleep Medicine

## 2024-10-01 ENCOUNTER — OFFICE VISIT (OUTPATIENT)
Dept: SLEEP MEDICINE | Age: 44
End: 2024-10-01
Payer: COMMERCIAL

## 2024-10-01 VITALS
DIASTOLIC BLOOD PRESSURE: 70 MMHG | HEIGHT: 73 IN | TEMPERATURE: 97.1 F | SYSTOLIC BLOOD PRESSURE: 106 MMHG | OXYGEN SATURATION: 98 % | BODY MASS INDEX: 26.4 KG/M2 | HEART RATE: 72 BPM | WEIGHT: 199.2 LBS

## 2024-10-01 DIAGNOSIS — R06.83 SNORING: ICD-10-CM

## 2024-10-01 DIAGNOSIS — E66.3 OVERWEIGHT WITH BODY MASS INDEX (BMI) 25.0-29.9: ICD-10-CM

## 2024-10-01 DIAGNOSIS — G47.19 EXCESSIVE DAYTIME SLEEPINESS: ICD-10-CM

## 2024-10-01 DIAGNOSIS — G47.30 SLEEP APNEA, UNSPECIFIED TYPE: Primary | ICD-10-CM

## 2024-10-01 PROCEDURE — 99213 OFFICE O/P EST LOW 20 MIN: CPT | Performed by: NURSE PRACTITIONER

## 2024-12-02 ENCOUNTER — HOSPITAL ENCOUNTER (OUTPATIENT)
Dept: SLEEP MEDICINE | Facility: HOSPITAL | Age: 44
Discharge: HOME | End: 2024-12-02
Admitting: NURSE PRACTITIONER
Payer: COMMERCIAL

## 2024-12-02 ENCOUNTER — OFFICE VISIT (OUTPATIENT)
Dept: INTERNAL MEDICINE | Facility: CLINIC | Age: 44
End: 2024-12-02
Payer: COMMERCIAL

## 2024-12-02 VITALS — BODY MASS INDEX: 26.37 KG/M2 | WEIGHT: 199 LBS | HEIGHT: 73 IN

## 2024-12-02 VITALS
RESPIRATION RATE: 18 BRPM | HEART RATE: 74 BPM | SYSTOLIC BLOOD PRESSURE: 114 MMHG | BODY MASS INDEX: 27.94 KG/M2 | WEIGHT: 211.8 LBS | DIASTOLIC BLOOD PRESSURE: 78 MMHG | TEMPERATURE: 97.7 F

## 2024-12-02 DIAGNOSIS — G47.19 EXCESSIVE DAYTIME SLEEPINESS: ICD-10-CM

## 2024-12-02 DIAGNOSIS — G47.30 SLEEP APNEA, UNSPECIFIED TYPE: ICD-10-CM

## 2024-12-02 DIAGNOSIS — R06.83 SNORING: ICD-10-CM

## 2024-12-02 DIAGNOSIS — H66.003 NON-RECURRENT ACUTE SUPPURATIVE OTITIS MEDIA OF BOTH EARS WITHOUT SPONTANEOUS RUPTURE OF TYMPANIC MEMBRANES: Primary | ICD-10-CM

## 2024-12-02 PROCEDURE — 99213 OFFICE O/P EST LOW 20 MIN: CPT

## 2024-12-02 PROCEDURE — 95800 SLP STDY UNATTENDED: CPT

## 2024-12-02 RX ORDER — METHYLPREDNISOLONE 4 MG/1
TABLET ORAL
Qty: 21 TABLET | Refills: 0 | Status: SHIPPED | OUTPATIENT
Start: 2024-12-02

## 2024-12-02 RX ORDER — AMOXICILLIN 500 MG/1
500 CAPSULE ORAL 2 TIMES DAILY
Qty: 20 CAPSULE | Refills: 0 | Status: SHIPPED | OUTPATIENT
Start: 2024-12-02 | End: 2024-12-12
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
## 2024-12-03 DIAGNOSIS — G47.33 OSA (OBSTRUCTIVE SLEEP APNEA): Primary | ICD-10-CM

## 2024-12-03 DIAGNOSIS — R06.83 SNORING: ICD-10-CM

## 2024-12-06 ENCOUNTER — TELEPHONE (OUTPATIENT)
Dept: SLEEP MEDICINE | Facility: CLINIC | Age: 44
End: 2024-12-06
Payer: COMMERCIAL

## 2024-12-20 ENCOUNTER — OFFICE VISIT (OUTPATIENT)
Dept: INTERNAL MEDICINE | Facility: CLINIC | Age: 44
End: 2024-12-20
Payer: COMMERCIAL

## 2024-12-20 VITALS
DIASTOLIC BLOOD PRESSURE: 78 MMHG | BODY MASS INDEX: 26.98 KG/M2 | HEART RATE: 72 BPM | OXYGEN SATURATION: 95 % | WEIGHT: 204.5 LBS | RESPIRATION RATE: 20 BRPM | TEMPERATURE: 97.5 F | SYSTOLIC BLOOD PRESSURE: 110 MMHG

## 2024-12-20 DIAGNOSIS — Z23 ENCOUNTER FOR VACCINATION: ICD-10-CM

## 2024-12-20 DIAGNOSIS — G47.33 OBSTRUCTIVE SLEEP APNEA: Primary | ICD-10-CM

## 2024-12-20 DIAGNOSIS — R41.840 POOR CONCENTRATION: ICD-10-CM

## 2024-12-20 DIAGNOSIS — F41.1 GENERALIZED ANXIETY DISORDER: ICD-10-CM

## 2024-12-20 DIAGNOSIS — R73.03 PREDIABETES: ICD-10-CM

## 2024-12-20 LAB
EXPIRATION DATE: ABNORMAL
HBA1C MFR BLD: 6 % (ref 4.5–5.7)
Lab: ABNORMAL

## 2024-12-20 PROCEDURE — 99214 OFFICE O/P EST MOD 30 MIN: CPT | Performed by: STUDENT IN AN ORGANIZED HEALTH CARE EDUCATION/TRAINING PROGRAM

## 2024-12-20 PROCEDURE — 90656 IIV3 VACC NO PRSV 0.5 ML IM: CPT | Performed by: STUDENT IN AN ORGANIZED HEALTH CARE EDUCATION/TRAINING PROGRAM

## 2024-12-20 PROCEDURE — 83036 HEMOGLOBIN GLYCOSYLATED A1C: CPT | Performed by: STUDENT IN AN ORGANIZED HEALTH CARE EDUCATION/TRAINING PROGRAM

## 2024-12-20 PROCEDURE — 90471 IMMUNIZATION ADMIN: CPT | Performed by: STUDENT IN AN ORGANIZED HEALTH CARE EDUCATION/TRAINING PROGRAM

## 2024-12-20 NOTE — ASSESSMENT & PLAN NOTE
Chronic, improving. Denies symptoms of depression. States anxiety is well controlled. Continue lexapro 10mg daily.

## 2024-12-20 NOTE — ASSESSMENT & PLAN NOTE
Chronic, unchanged. Suspect this is the leading cause of his fatigue and unrestorative sleep. Just got CPAP yesterday. Counseled on compliance. Monitor

## 2024-12-20 NOTE — PROGRESS NOTES
Follow Up Office Visit      Date: 2024   Patient Name: Devon Smith  : 1980   MRN: [id number]     Chief Complaint:    Chief Complaint   Patient presents with    Fatigue     Follow up and lexapro        History of Present Illness: Devon Smith is a 44 y.o. male with history of anxiety, myocardial infarction complicated by systolic heart failure, prediabetes, RADHA who is here today to follow up with the following problems.    HPI  History of Present Illness    He recently acquired a CPAP machine, which he utilized for the first time last night. He reports that it will take some time to get used to it. The mask covers his nose and mouth. He has observed a slight improvement in his energy levels over the past few months but continues to experience fatigue. He has been off work for a week and has been spending most of his time resting at home. His Apple watch data indicates that he only achieves approximately 30 minutes of deep sleep per night.    He discontinued Brilinta in 2024 and is currently on a regimen of baby aspirin once daily for stent maintenance. He reports no leg swelling or difficulty lying flat. He has been experiencing cold intolerance since his heart attack, particularly when the temperature drops below 50 degrees, which discourages him from outdoor activities. He is uncertain if this is a residual effect of his heart attack or if he needs to reacclimate to colder temperatures, as he previously worked through the winter season.    He is currently on Lexapro for anxiety management, which he reports as being effective. However, he notes a decrease in productivity this year, which he attributes to either fatigue or lack of motivation. He plans to reassess his condition after a month of CPAP use. He reports a generally good mood and does not feel depressed.    He has a family history of autism and ADHD in his brother and suspects he may have had ADHD symptoms throughout  his life. He is considering whether a formal diagnosis and medication would be beneficial.    He received an influenza vaccine in 01/2024 and expresses interest in receiving another one today. He declines the COVID-19 vaccine after risk and benefit discusison         Fatigue  - persistent since MI  -Had snoring, referred to sleep medicine last visit.  I personally reviewed note by BECK Dailey of sleep medicine dated 10/1/2024, at that time recommended home sleep study.  Underwent home sleep study on 12-24 which revealed obstructive sleep apnea with AHI of 14.6.  Patient was prescribed CPAP on 12/6/2024      Prior STEMI  Heart failure  -I personally reviewed note by Dr. Arsenio Ramos of cardiology dated 7/24/2024.  At that time discontinue Brilinta.  Recommended continuing spironolactone, Jardiance, metoprolol, lisinopril.  Continued to refuse ICD placement.  Recommended follow-up in 9 months.    Subjective      Review of Systems:   Review of Systems    I have reviewed the patients family history, social history, past medical history, past surgical history and have updated it as appropriate.     Medications:     Current Outpatient Medications:     aspirin 81 MG EC tablet, Take 1 tablet by mouth Daily., Disp: 90 tablet, Rfl: 3    clotrimazole (LOTRIMIN) 1 % cream, Apply 1 Application topically to the appropriate area as directed As Needed., Disp: , Rfl:     empagliflozin (Jardiance) 10 MG tablet tablet, Take 1 tablet by mouth once daily, Disp: 90 tablet, Rfl: 3    escitalopram (LEXAPRO) 10 MG tablet, Take 1 tablet by mouth Daily., Disp: 90 tablet, Rfl: 3    lisinopril (PRINIVIL,ZESTRIL) 5 MG tablet, Take 1 tablet by mouth once daily, Disp: 90 tablet, Rfl: 3    loratadine (Claritin) 10 MG tablet, Take 1 tablet by mouth Daily., Disp: , Rfl:     methylPREDNISolone (MEDROL) 4 MG dose pack, Take as directed on package instructions., Disp: 21 tablet, Rfl: 0    metoprolol succinate XL (TOPROL-XL) 25 MG 24 hr tablet,  Take 1 tablet by mouth once daily, Disp: 90 tablet, Rfl: 3    nitroglycerin (NITROSTAT) 0.4 MG SL tablet, Place 1 tablet under the tongue Every 5 (Five) Minutes As Needed for Chest Pain. Take no more than 3 doses in 15 minutes., Disp: 25 tablet, Rfl: 12    rosuvastatin (CRESTOR) 40 MG tablet, TAKE 1 TABLET BY MOUTH ONCE DAILY AT NIGHT, Disp: 90 tablet, Rfl: 3    spironolactone (ALDACTONE) 25 MG tablet, Take 0.5 tablets by mouth Daily., Disp: 30 tablet, Rfl: 5    valACYclovir (VALTREX) 1000 MG tablet, Take 1 tablet by mouth As Needed (flareup)., Disp: , Rfl:     Allergies:   No Known Allergies    Objective     Physical Exam: Please see above  Vital Signs:   Vitals:    12/20/24 1014   BP: 110/78   BP Location: Left arm   Patient Position: Sitting   Cuff Size: Adult   Pulse: 72   Resp: 20   Temp: 97.5 °F (36.4 °C)   TempSrc: Temporal   SpO2: 95%   Weight: 92.8 kg (204 lb 8 oz)   PainSc: 0-No pain     Body mass index is 26.98 kg/m².          Physical Exam  Vitals reviewed.   Constitutional:       General: He is not in acute distress.     Appearance: Normal appearance. He is not ill-appearing or toxic-appearing.   HENT:      Head: Normocephalic and atraumatic.      Right Ear: External ear normal.      Left Ear: External ear normal.      Nose: Nose normal.      Mouth/Throat:      Mouth: Mucous membranes are moist.   Eyes:      General:         Right eye: No discharge.         Left eye: No discharge.      Extraocular Movements: Extraocular movements intact.   Cardiovascular:      Rate and Rhythm: Normal rate and regular rhythm.      Pulses: Normal pulses.      Heart sounds: Normal heart sounds.   Pulmonary:      Effort: Pulmonary effort is normal. No respiratory distress.      Breath sounds: Normal breath sounds. No stridor. No wheezing, rhonchi or rales.   Abdominal:      General: Abdomen is flat. There is no distension.   Musculoskeletal:         General: No swelling.   Skin:     General: Skin is warm and dry.       Capillary Refill: Capillary refill takes less than 2 seconds.   Neurological:      General: No focal deficit present.      Mental Status: He is alert and oriented to person, place, and time. Mental status is at baseline.   Psychiatric:         Mood and Affect: Mood normal.         Behavior: Behavior normal.         Judgment: Judgment normal.       Physical Exam        Procedures    Results:   Labs:   Hemoglobin A1C   Date Value Ref Range Status   06/21/2024 6.00 (H) 4.80 - 5.60 % Final     TSH   Date Value Ref Range Status   06/21/2024 2.030 0.270 - 4.200 uIU/mL Final      Results  Laboratory Studies  A1c was 6%.      Imaging:   No valid procedures specified.     Assessment / Plan      Assessment/Plan:   Problem List Items Addressed This Visit       Prediabetes    Current Assessment & Plan     Chronic, improving with treatment.  Patient currently on SGLT2 inhibitor for heart failure with reduced ejection fraction.  A1c is stable at 6%. Continue jardiance 10mg daily. Counseled on low carb diet and exercise to prevent development of diabetes.          Relevant Orders    POC Glycosylated Hemoglobin (Hb A1C)    Generalized anxiety disorder    Current Assessment & Plan     Chronic, improving. Denies symptoms of depression. States anxiety is well controlled. Continue lexapro 10mg daily.         Obstructive sleep apnea - Primary    Current Assessment & Plan     Chronic, unchanged. Suspect this is the leading cause of his fatigue and unrestorative sleep. Just got CPAP yesterday. Counseled on compliance. Monitor          Other Visit Diagnoses       Encounter for vaccination        Relevant Orders    Fluzone >6mos ([id number]) (Completed)    Poor concentration        Relevant Orders    Ambulatory Referral to Behavioral Health            Assessment & Plan       Poor concentration.  The patient reports lifelong symptoms suggestive of ADHD and expresses interest in a formal evaluation. His brother was recently diagnosed with  autism and ADHD. Non-stimulant medications like Strattera and cognitive behavioral therapy are potential treatment options. A referral to a psychiatrist for a comprehensive ADHD evaluation will be initiated.     Health maintenance.  The patient's vital signs are within normal limits, and his blood pressure is well-controlled. His A1c level is stable at 6%, indicating no progression towards diabetes.  He is advised to maintain a low-carbohydrate diet to manage his prediabetic condition. He will receive an influenza vaccine today. He declines the COVID-19 vaccine.      Follow-up  The patient will follow up in the summer for his annual checkup.       Follow Up:   Return in about 6 months (around 6/24/2025) for Annual, Fasting.        Galo Borges MD   [facility]    Patient or patient representative verbalized consent for the use of Ambient Listening during the visit with  Galo Borges MD for chart documentation. 12/20/2024  10:57 EST

## 2024-12-20 NOTE — ASSESSMENT & PLAN NOTE
Chronic, improving with treatment.  Patient currently on SGLT2 inhibitor for heart failure with reduced ejection fraction.  A1c is stable at 6%. Continue jardiance 10mg daily. Counseled on low carb diet and exercise to prevent development of diabetes.

## 2025-01-06 DIAGNOSIS — F41.1 GENERALIZED ANXIETY DISORDER: ICD-10-CM

## 2025-01-06 RX ORDER — ESCITALOPRAM OXALATE 10 MG/1
10 TABLET ORAL DAILY
Qty: 30 TABLET | Refills: 0 | Status: SHIPPED | OUTPATIENT
Start: 2025-01-06

## 2025-02-04 DIAGNOSIS — F41.1 GENERALIZED ANXIETY DISORDER: ICD-10-CM

## 2025-02-04 RX ORDER — ESCITALOPRAM OXALATE 10 MG/1
10 TABLET ORAL DAILY
Qty: 30 TABLET | Refills: 0 | Status: SHIPPED | OUTPATIENT
Start: 2025-02-04

## 2025-02-21 RX ORDER — ROSUVASTATIN CALCIUM 40 MG/1
40 TABLET, COATED ORAL NIGHTLY
Qty: 90 TABLET | Refills: 0 | Status: SHIPPED | OUTPATIENT
Start: 2025-02-21

## 2025-02-21 NOTE — TELEPHONE ENCOUNTER
Lab Results   Component Value Date    CHOL 127 03/29/2024    TRIG 75 03/29/2024    HDL 53 03/29/2024    LDL 59 03/29/2024

## 2025-02-26 NOTE — PROGRESS NOTES
Sleep Clinic Follow Up Note    Chief Complaint  Sleeping Problem, Sleep Apnea, and Follow-up    Subjective     History of Present Illness (from previous encounter on 10/4/2024):  Devon Smith is a 44 y.o. male with myocardial infarction, heart failure (NYHA class II), and hyperlipidemia.  The patient is referred by Galo Borges MD with primary care.  Patient has had difficulty with snoring and daytime fatigue.  Review of self-reported questionnaire notes symptoms including the aforementioned snoring, and fatigue as well as frequent awakening, grinding teeth, pain at night, lack of concentration, and heart failure.  Patient reports symptoms have been ongoing for 2-3 years.  He typically goes to bed at 9:30 PM waking at 7 AM on weekdays and weekends.  He does take 1 hour naps.  Patient denies use of tobacco, alcohol, or illicit/recreational drugs.  He drinks 2-3 vivienne daily.  The patient's significant other reports witnessed episodes of heavy snoring which occur nightly and in all sleeping positions.     Further details are as follows:     Q[place]man Scale is (out of 24): Total score: 10 (End copied text).    Interval History:  Devon Smith is a 45 y.o. male returns for follow up and compliance of PAP therapy. The patient was last seen on 10/4/2024 by me. Overall the patient feels good with regard to therapy. The device appears to be working appropriately. On average the patient sleeps 7-12 hours per night. The patient wakes 1-2 times per night. He feels improved.     The patient reports the following changes to their medical and medication history since they were last seen:  None    Further details are as follows:      Q[place]man Scale is (out of 24): Total score: 10     Weight:  Current Weight: 212 lb    Weight change in the last year:  gain: 0 lbs    The patient's relevant past medical, surgical, family, and social history reviewed and updated in Epic as appropriate.    PMH:    Past Medical History:    Diagnosis Date    Allergic 1/25/78    Hyperlipidemia 4/2022    MRSA cellulitis     Myocardial infarction 3/1/2023    Obstructive sleep apnea 12/20/2024     Past Surgical History:   Procedure Laterality Date    CARDIAC CATHETERIZATION N/A 03/01/2023    Procedure: Left Heart Cath;  Surgeon: Arsenio Ramos MD;  [facility];  Service: Cardiovascular;  Laterality: N/A;    CARDIAC CATHETERIZATION N/A 03/01/2023    Procedure: Optical Coherent Tomography;  Surgeon: Arsenio Ramos MD;  [facility];  Service: Cardiovascular;  Laterality: N/A;    CARDIAC CATHETERIZATION N/A 03/01/2023    Procedure: Percutaneous Coronary Intervention;  Surgeon: Arsenio Ramos MD;  [facility];  Service: Cardiovascular;  Laterality: N/A;    CORONARY STENT PLACEMENT  3/1/2023    INCISION AND DRAINAGE ABSCESS  2013       No Known Allergies    MEDS:  Prior to Admission medications    Medication Sig Start Date End Date Taking? Authorizing Provider   aspirin 81 MG EC tablet Take 1 tablet by mouth Daily. 3/4/23   Arsenio Ramos MD   clotrimazole (LOTRIMIN) 1 % cream Apply 1 Application topically to the appropriate area as directed As Needed. 7/13/23   Manoj Rose MD   empagliflozin (Jardiance) 10 MG tablet tablet Take 1 tablet by mouth once daily 7/22/24   Arsenio Ramos MD   escitalopram (LEXAPRO) 10 MG tablet Take 1 tablet by mouth once daily 2/4/25   Galo Borges MD   lisinopril (PRINIVIL,ZESTRIL) 5 MG tablet Take 1 tablet by mouth once daily 5/28/24   Arsenio Ramos MD   loratadine (Claritin) 10 MG tablet Take 1 tablet by mouth Daily.    Manoj Rose MD   metoprolol succinate XL (TOPROL-XL) 25 MG 24 hr tablet Take 1 tablet by mouth once daily 9/9/24   Arsenio Ramos MD   nitroglycerin (NITROSTAT) 0.4 MG SL tablet Place 1 tablet under the tongue Every 5 (Five) Minutes As Needed for Chest Pain. Take no more than 3 doses  "in 15 minutes. 3/3/23   Arsenio Ramos MD   rosuvastatin (CRESTOR) 40 MG tablet TAKE 1 TABLET BY MOUTH ONCE DAILY AT NIGHT 2/21/25   Arsenio Ramos MD   spironolactone (ALDACTONE) 25 MG tablet Take 0.5 tablets by mouth Daily. 4/23/24   Arsenio Ramos MD   valACYclovir (VALTREX) 1000 MG tablet Take 1 tablet by mouth As Needed (flareup). 3/27/24   Jreemy Wetzel PA-C         FH:  Family History   Problem Relation Age of Onset    Migraine headaches Mother     ADD / ADHD Mother     Heart attack Father 67        CABG    ADD / ADHD Brother     Autism spectrum disorder Brother     Heart disease Maternal Grandmother         heart failure    Diabetes Maternal Grandmother     Heart attack Paternal Grandfather         CAD    Colon cancer Neg Hx     Prostate cancer Neg Hx        Objective   Vital Signs:  /70 (BP Location: Left arm, Patient Position: Sitting, Cuff Size: Adult)   Pulse 82   Temp 97.7 °F (36.5 °C) (Temporal)   Ht 185.4 cm (72.99\")   Wt 96.2 kg (212 lb)   SpO2 99%   BMI 27.98 kg/m²     Patient's (Body mass index is 27.98 kg/m².) indicates that they are overweight (BMI 25-29.9) with health related conditions that include obstructive sleep apnea .            Physical Exam  Vitals reviewed.   Constitutional:       Appearance: Normal appearance.   HENT:      Head: Normocephalic and atraumatic.      Nose: Nose normal.      Mouth/Throat:      Mouth: Mucous membranes are moist.   Cardiovascular:      Rate and Rhythm: Normal rate and regular rhythm.      Heart sounds: No murmur heard.     No friction rub. No gallop.   Pulmonary:      Effort: Pulmonary effort is normal. No respiratory distress.      Breath sounds: Normal breath sounds. No wheezing or rhonchi.   Neurological:      Mental Status: He is alert and oriented to person, place, and time.   Psychiatric:         Behavior: Behavior normal.               Result Review :           PAP Report:  AHI: 4.7/h  Days of Usage: 30/30 (100%)  Number of " Days Greater than 4 hours: 29/30 (97%)  Average time (days used): 8 hours 27 minutes  95th Percentile Pressure: 13 cmH2O  95th percentile leaks: 12 L/min  Settings: Auto CPAP-8/18 cm H2O, EPR full-time, EPR level 3, response standard.       Assessment and Plan  Devon Smith is a 45 y.o. male who returns for follow-up and compliance of PAP therapy.  The Pap report has been reviewed.  Overall usage is at 100% with compliance at 97%.  The patient averages 8 hours and 27 minutes of therapy.  Sleep apnea is well-controlled with an AHI of 4.7/h. I will refill the patient's supplies, and I have asked them to return for follow-up and compliance in 1 year or sooner should they have further questions or concerns.    Diagnoses and all orders for this visit:    1. RADHA (obstructive sleep apnea) (Primary)  -     PAP Therapy    2. Overweight with body mass index (BMI) 25.0-29.9         The patient continues to use and benefit from PAP therapy.    1. The patient was counseled regarding multimodal approach with healthy nutrition, healthy sleep, regular physical activity, social activities, counseling, and medications. Encouraged to practice lateral sleep position. Avoid alcohol and sedatives close to bedtime.     2.  We will refill supplies x1 year.  Return to clinic 1 year or sooner if symptoms warrant. I have reviewed the results of my evaluation and impression and discussed my recommendations in detail with the patient.           Follow Up  Return in about 1 year (around 3/3/2026) for Annual visit, Video visit.  Patient was given instructions and counseling regarding his condition or for health maintenance advice. Please see specific information pulled into the AVS if appropriate.       BECK Thao, ACNP-BC  Pulmonology, Critical Care, and Sleep Medicine

## 2025-03-03 ENCOUNTER — OFFICE VISIT (OUTPATIENT)
Dept: SLEEP MEDICINE | Age: 45
End: 2025-03-03
Payer: COMMERCIAL

## 2025-03-03 VITALS
HEART RATE: 82 BPM | BODY MASS INDEX: 28.1 KG/M2 | HEIGHT: 73 IN | DIASTOLIC BLOOD PRESSURE: 70 MMHG | SYSTOLIC BLOOD PRESSURE: 110 MMHG | WEIGHT: 212 LBS | TEMPERATURE: 97.7 F | OXYGEN SATURATION: 99 %

## 2025-03-03 DIAGNOSIS — E66.3 OVERWEIGHT WITH BODY MASS INDEX (BMI) 25.0-29.9: ICD-10-CM

## 2025-03-03 DIAGNOSIS — G47.33 OSA (OBSTRUCTIVE SLEEP APNEA): Primary | ICD-10-CM

## 2025-03-07 DIAGNOSIS — F41.1 GENERALIZED ANXIETY DISORDER: ICD-10-CM

## 2025-03-07 RX ORDER — ESCITALOPRAM OXALATE 10 MG/1
10 TABLET ORAL DAILY
Qty: 30 TABLET | Refills: 0 | Status: SHIPPED | OUTPATIENT
Start: 2025-03-07

## 2025-04-05 DIAGNOSIS — F41.1 GENERALIZED ANXIETY DISORDER: ICD-10-CM

## 2025-04-07 RX ORDER — ESCITALOPRAM OXALATE 10 MG/1
10 TABLET ORAL DAILY
Qty: 30 TABLET | Refills: 0 | Status: SHIPPED | OUTPATIENT
Start: 2025-04-07

## 2025-04-21 RX ORDER — SPIRONOLACTONE 25 MG/1
12.5 TABLET ORAL DAILY
Qty: 30 TABLET | Refills: 0 | Status: SHIPPED | OUTPATIENT
Start: 2025-04-21 | End: 2025-04-24 | Stop reason: SDUPTHER

## 2025-04-21 NOTE — TELEPHONE ENCOUNTER
Lab Results   Component Value Date    GLUCOSE 124 (H) 06/21/2024    BUN 18 06/21/2024    CREATININE 1.06 06/21/2024     06/21/2024    K 4.0 06/21/2024     06/21/2024    CALCIUM 9.4 06/21/2024    PROTEINTOT 7.0 06/21/2024    ALBUMIN 4.5 06/21/2024    ALT 38 06/21/2024    AST 29 06/21/2024    ALKPHOS 51 06/21/2024    BILITOT 1.0 06/21/2024    GLOB 2.5 06/21/2024    AGRATIO 1.8 06/21/2024    BCR 17.0 06/21/2024    ANIONGAP 10.1 06/21/2024    EGFR 87.7 06/21/2024

## 2025-04-24 ENCOUNTER — OFFICE VISIT (OUTPATIENT)
Dept: CARDIOLOGY | Facility: CLINIC | Age: 45
End: 2025-04-24
Payer: COMMERCIAL

## 2025-04-24 VITALS
DIASTOLIC BLOOD PRESSURE: 78 MMHG | SYSTOLIC BLOOD PRESSURE: 112 MMHG | HEIGHT: 74 IN | HEART RATE: 85 BPM | WEIGHT: 214 LBS | OXYGEN SATURATION: 97 % | BODY MASS INDEX: 27.46 KG/M2

## 2025-04-24 DIAGNOSIS — I25.5 ISCHEMIC CARDIOMYOPATHY: Primary | ICD-10-CM

## 2025-04-24 DIAGNOSIS — I10 ESSENTIAL HYPERTENSION: ICD-10-CM

## 2025-04-24 DIAGNOSIS — I21.02 STEMI INVOLVING LEFT ANTERIOR DESCENDING CORONARY ARTERY: ICD-10-CM

## 2025-04-24 DIAGNOSIS — I50.22 CHRONIC SYSTOLIC (CONGESTIVE) HEART FAILURE: ICD-10-CM

## 2025-04-24 RX ORDER — LISINOPRIL 5 MG/1
5 TABLET ORAL DAILY
Qty: 90 TABLET | Refills: 3 | Status: SHIPPED | OUTPATIENT
Start: 2025-04-24

## 2025-04-24 RX ORDER — ROSUVASTATIN CALCIUM 40 MG/1
40 TABLET, COATED ORAL NIGHTLY
Qty: 90 TABLET | Refills: 3 | Status: SHIPPED | OUTPATIENT
Start: 2025-04-24

## 2025-04-24 RX ORDER — SPIRONOLACTONE 25 MG/1
12.5 TABLET ORAL DAILY
Qty: 45 TABLET | Refills: 3 | Status: SHIPPED | OUTPATIENT
Start: 2025-04-24

## 2025-04-24 RX ORDER — NITROGLYCERIN 0.4 MG/1
0.4 TABLET SUBLINGUAL
Qty: 25 TABLET | Refills: 12 | Status: SHIPPED | OUTPATIENT
Start: 2025-04-24

## 2025-04-24 RX ORDER — METOPROLOL SUCCINATE 25 MG/1
25 TABLET, EXTENDED RELEASE ORAL DAILY
Qty: 90 TABLET | Refills: 3 | Status: SHIPPED | OUTPATIENT
Start: 2025-04-24

## 2025-04-24 NOTE — PROGRESS NOTES
OFFICE VISIT  NOTE  [facility] CARDIOLOGY      Name: Devon Smith    Date: 2025  MRN:  [id number]  :  1980      REFERRING/PRIMARY PROVIDER:  Galo Borges MD     Chief Complaint   Patient presents with    Coronary artery disease involving native coronary artery of     Pt denies symptoms during triage.     HPI: Devon Smith is a 45 y.o. male who presents today for follow up for ICM, anterior STEMI 2023. History of hyperlipidemia, presented with substernal chest pain/pressure with radiation to bilateral arms and jaws. Emergent cath 3/1/23 with KAVON to 100% occluded LAD.  Echo showed EF less than 20%. Discharged with Lifevest in place, intolerant to Entresto due to hypotension, but he is on Toprol, Jardiance and Lisinopril 5mg. Repeat echo 10/2023 showed EF 31-35% on 10/6/23. Patient has decided not to pursue ICD as he is doing well.    He is stable from cardiac standpoint since last visit. Denies chest pain, BELLO, heart failure symptoms. He does have fatigue that he notes when he is over exerting, but does not have to stop and rest. This is stable, and improved from the time of his MI.     ROS:Pertinent positives as listed in the HPI.  All other systems reviewed and negative.    Past Medical History:   Diagnosis Date    ADHD (attention deficit hyperactivity disorder)     Allergic 76    Coronary artery disease 3/1/2023    Hyperlipidemia 2022    MRSA cellulitis     Myocardial infarction 3/1/2023    Obstructive sleep apnea 2024       Past Surgical History:   Procedure Laterality Date    CARDIAC CATHETERIZATION N/A 2023    Procedure: Left Heart Cath;  Surgeon: Arsenio Ramos MD;  [facility];  Service: Cardiovascular;  Laterality: N/A;    CARDIAC CATHETERIZATION N/A 2023    Procedure: Optical Coherent Tomography;  Surgeon: Arsenio Ramos MD;  [facility];  Service: Cardiovascular;   "Laterality: N/A;    CARDIAC CATHETERIZATION N/A 03/01/2023    Procedure: Percutaneous Coronary Intervention;  Surgeon: Arsenio Ramos MD;  [facility]: [facility] CATH INVASIVE LOC[facility];  Service: Cardiovascular;  Laterality: N/A;    CORONARY STENT PLACEMENT  3/1/2023    INCISION AND DRAINAGE ABSCESS  2013       Social History     Socioeconomic History    Marital status:    Tobacco Use    Smoking status: Never     Passive exposure: Never    Smokeless tobacco: Never   Vaping Use    Vaping status: Never Used   Substance and Sexual Activity    Alcohol use: Never    Drug use: Never    Sexual activity: Not Currently     Partners: Female     Birth control/protection: None       Family History   Problem Relation Age of Onset    Migraine headaches Mother     ADD / ADHD Mother     Heart attack Father 67        CABG    ADD / ADHD Brother     Autism spectrum disorder Brother     Heart disease Maternal Grandmother         heart failure    Diabetes Maternal Grandmother     Heart attack Paternal Grandfather         CAD    Colon cancer Neg Hx     Prostate cancer Neg Hx         No Known Allergies    Current Outpatient Medications   Medication Instructions    aspirin 81 mg, Oral, Daily    clotrimazole (LOTRIMIN) 1 % cream As Needed    escitalopram (LEXAPRO) 10 mg, Oral, Daily    Jardiance 10 mg, Oral, Daily    lisinopril (PRINIVIL,ZESTRIL) 5 mg, Oral, Daily    loratadine (CLARITIN) 10 mg, Daily    metoprolol succinate XL (TOPROL-XL) 25 mg, Oral, Daily    nitroglycerin (NITROSTAT) 0.4 mg, Sublingual, Every 5 Minutes PRN, Take no more than 3 doses in 15 minutes.    rosuvastatin (CRESTOR) 40 mg, Oral, Nightly    spironolactone (ALDACTONE) 12.5 mg, Oral, Daily    valACYclovir (VALTREX) 1,000 mg, Oral, As Needed       Vitals:    04/24/25 1436   BP: 112/78   BP Location: Right arm   Patient Position: Sitting   Cuff Size: Adult   Pulse: 85   SpO2: 97%   Weight: 97.1 kg (214 lb)   Height: 188 cm (74\")     Body mass index is 27.48 " "kg/m².    PHYSICAL EXAM:    General Appearance:   well developed  well nourished  Neck:  thyroid not enlarged  supple  Respiratory:  no respiratory distress  normal breath sounds  no rales  Cardiovascular:  no jugular venous distention  regular rhythm  apical impulse normal  S1 normal, S2 normal  no S3, no S4   no murmur  no rub, no thrill  lower extremity edema: none    Skin:   warm, dry    RESULTS:   Procedures    Results for orders placed during the hospital encounter of 10/06/23    Adult Transthoracic Echo Complete W/ Cont if Necessary Per Protocol    Interpretation Summary    Left ventricular systolic function is moderately decreased. Calculated left ventricular EF = 33.5% Left ventricular ejection fraction appears to be 31 - 35%.    The left ventricular cavity is mildly dilated.    The following left ventricular wall segments are akinetic: apical anterior, apical lateral, apical inferior, apical septal and apex.    Left ventricular diastolic function is consistent with (grade II w/high LAP) pseudonormalization.    Estimated right ventricular systolic pressure from tricuspid regurgitation is normal (<35 mmHg). Calculated right ventricular systolic pressure from tricuspid regurgitation is 8 mmHg.    Improved EF compared to 3/2023 echo        Labs:  Lab Results   Component Value Date    CHOL 127 03/29/2024    TRIG 75 03/29/2024    HDL 53 03/29/2024    LDL 59 03/29/2024    AST 29 06/21/2024    ALT 38 06/21/2024     Lab Results   Component Value Date    HGBA1C 6.0 (A) 12/20/2024     Creatinine   Date Value Ref Range Status   06/21/2024 1.06 0.76 - 1.27 mg/dL Final   04/23/2024 0.76 0.76 - 1.27 mg/dL Final   03/29/2024 1.08 0.76 - 1.27 mg/dL Final   03/01/2023 0.90 0.60 - 1.30 mg/dL Final     Comment:     Serial Number: [id number]:  [id number]     No results found for: \"EGFRIFNONA\"      ASSESSMENT:  Problem List Items Addressed This Visit          Cardiac and Vasculature    STEMI involving left anterior descending " coronary artery    Relevant Medications    metoprolol succinate XL (TOPROL-XL) 25 MG 24 hr tablet    nitroglycerin (NITROSTAT) 0.4 MG SL tablet    Other Relevant Orders    Adult Transthoracic Echo Complete w/ Color, Spectral and Contrast if necessary per protocol    Essential hypertension    Relevant Medications    spironolactone (ALDACTONE) 25 MG tablet    lisinopril (PRINIVIL,ZESTRIL) 5 MG tablet    metoprolol succinate XL (TOPROL-XL) 25 MG 24 hr tablet    Ischemic cardiomyopathy - Primary    Relevant Medications    metoprolol succinate XL (TOPROL-XL) 25 MG 24 hr tablet    nitroglycerin (NITROSTAT) 0.4 MG SL tablet    Other Relevant Orders    Adult Transthoracic Echo Complete w/ Color, Spectral and Contrast if necessary per protocol     Other Visit Diagnoses         Chronic systolic (congestive) heart failure        Relevant Medications    metoprolol succinate XL (TOPROL-XL) 25 MG 24 hr tablet    nitroglycerin (NITROSTAT) 0.4 MG SL tablet    Other Relevant Orders    Adult Transthoracic Echo Complete w/ Color, Spectral and Contrast if necessary per protocol            PLAN:  1.  CAD with anterior STEMI 3/2023:  [facility] 3/1/2023 with 100% mid LAD thrombotic occlusion s/p KAVON, normal RCA and LCx  Continue ASA, BB, statin   No angina or anginal equivalent        2.  Ischemic cardiomyopathy/ Systolic heart failure   Could not tolerate Entresto due to hypotension  On spironolactone, Jardiance, Toprol and Lisinopril, continue     Referred to Dr. Styles for ICD discussion, but patient has decided against it.    Repeat echo to relook EF     3.  Mixed hyperlipidemia:   prior to MI  LDL improved to 59, continue Crestor 40mg        4. Fatigue:  Increase activity, symptoms are overall stable to slightly improved  Follow up with PCP for routine labs in June       Advance Care Planning   ACP discussion was held with the patient during this visit. Patient does not have an advance directive, declines further assistance.             Follow-up   Return in about 9 months (around 1/24/2026) for with RDS .      Electronically signed by Lizabeth Live PA-C, 04/24/25, 3:16 PM EDT.

## 2025-05-09 DIAGNOSIS — F41.1 GENERALIZED ANXIETY DISORDER: ICD-10-CM

## 2025-05-09 RX ORDER — ESCITALOPRAM OXALATE 10 MG/1
10 TABLET ORAL DAILY
Qty: 30 TABLET | Refills: 0 | Status: SHIPPED | OUTPATIENT
Start: 2025-05-09

## 2025-06-07 DIAGNOSIS — F41.1 GENERALIZED ANXIETY DISORDER: ICD-10-CM

## 2025-06-09 RX ORDER — ESCITALOPRAM OXALATE 10 MG/1
10 TABLET ORAL DAILY
Qty: 30 TABLET | Refills: 0 | Status: SHIPPED | OUTPATIENT
Start: 2025-06-09

## 2025-06-23 ENCOUNTER — OFFICE VISIT (OUTPATIENT)
Dept: INTERNAL MEDICINE | Facility: CLINIC | Age: 45
End: 2025-06-23
Payer: COMMERCIAL

## 2025-06-23 VITALS
WEIGHT: 211.38 LBS | RESPIRATION RATE: 16 BRPM | OXYGEN SATURATION: 94 % | BODY MASS INDEX: 28.63 KG/M2 | DIASTOLIC BLOOD PRESSURE: 70 MMHG | SYSTOLIC BLOOD PRESSURE: 116 MMHG | HEART RATE: 83 BPM | HEIGHT: 72 IN | TEMPERATURE: 98 F

## 2025-06-23 DIAGNOSIS — F41.1 GENERALIZED ANXIETY DISORDER: ICD-10-CM

## 2025-06-23 DIAGNOSIS — R53.83 OTHER FATIGUE: ICD-10-CM

## 2025-06-23 DIAGNOSIS — I25.10 CORONARY ARTERY DISEASE INVOLVING NATIVE CORONARY ARTERY OF NATIVE HEART WITHOUT ANGINA PECTORIS: ICD-10-CM

## 2025-06-23 DIAGNOSIS — R73.03 PREDIABETES: ICD-10-CM

## 2025-06-23 DIAGNOSIS — E78.5 HYPERLIPIDEMIA LDL GOAL <70: ICD-10-CM

## 2025-06-23 DIAGNOSIS — I10 ESSENTIAL HYPERTENSION: ICD-10-CM

## 2025-06-23 DIAGNOSIS — K21.9 GASTROESOPHAGEAL REFLUX DISEASE, UNSPECIFIED WHETHER ESOPHAGITIS PRESENT: ICD-10-CM

## 2025-06-23 DIAGNOSIS — G47.33 OBSTRUCTIVE SLEEP APNEA: ICD-10-CM

## 2025-06-23 DIAGNOSIS — Z00.00 ROUTINE HEALTH MAINTENANCE: Primary | ICD-10-CM

## 2025-06-23 DIAGNOSIS — Z13.6 SCREENING FOR ISCHEMIC HEART DISEASE: ICD-10-CM

## 2025-06-23 LAB
ALBUMIN SERPL-MCNC: 4.3 G/DL (ref 3.5–5.2)
ALBUMIN/GLOB SERPL: 1.6 G/DL
ALP SERPL-CCNC: 57 U/L (ref 39–117)
ALT SERPL W P-5'-P-CCNC: 21 U/L (ref 1–41)
ANION GAP SERPL CALCULATED.3IONS-SCNC: 10.3 MMOL/L (ref 5–15)
AST SERPL-CCNC: 18 U/L (ref 1–40)
BASOPHILS # BLD AUTO: 0.02 10*3/MM3 (ref 0–0.2)
BASOPHILS NFR BLD AUTO: 0.4 % (ref 0–1.5)
BILIRUB SERPL-MCNC: 1.1 MG/DL (ref 0–1.2)
BUN SERPL-MCNC: 17 MG/DL (ref 6–20)
BUN/CREAT SERPL: 14.3 (ref 7–25)
CALCIUM SPEC-SCNC: 10 MG/DL (ref 8.6–10.5)
CHLORIDE SERPL-SCNC: 102 MMOL/L (ref 98–107)
CHOLEST SERPL-MCNC: 150 MG/DL (ref 0–200)
CO2 SERPL-SCNC: 25.7 MMOL/L (ref 22–29)
CREAT SERPL-MCNC: 1.19 MG/DL (ref 0.76–1.27)
DEPRECATED RDW RBC AUTO: 40.8 FL (ref 37–54)
EGFRCR SERPLBLD CKD-EPI 2021: 75.8 ML/MIN/1.73
EOSINOPHIL # BLD AUTO: 0.11 10*3/MM3 (ref 0–0.4)
EOSINOPHIL NFR BLD AUTO: 1.9 % (ref 0.3–6.2)
ERYTHROCYTE [DISTWIDTH] IN BLOOD BY AUTOMATED COUNT: 12.9 % (ref 12.3–15.4)
FERRITIN SERPL-MCNC: 129 NG/ML (ref 30–400)
FOLATE SERPL-MCNC: >20 NG/ML (ref 4.78–24.2)
GLOBULIN UR ELPH-MCNC: 2.7 GM/DL
GLUCOSE SERPL-MCNC: 130 MG/DL (ref 65–99)
HBA1C MFR BLD: 6.3 % (ref 4.8–5.6)
HCT VFR BLD AUTO: 41 % (ref 37.5–51)
HDLC SERPL-MCNC: 38 MG/DL (ref 40–60)
HGB BLD-MCNC: 13.9 G/DL (ref 13–17.7)
IMM GRANULOCYTES # BLD AUTO: 0.01 10*3/MM3 (ref 0–0.05)
IMM GRANULOCYTES NFR BLD AUTO: 0.2 % (ref 0–0.5)
IRON 24H UR-MRATE: 90 MCG/DL (ref 59–158)
IRON SATN MFR SERPL: 26 % (ref 20–50)
LDLC SERPL CALC-MCNC: 79 MG/DL (ref 0–100)
LDLC/HDLC SERPL: 1.94 {RATIO}
LYMPHOCYTES # BLD AUTO: 1.46 10*3/MM3 (ref 0.7–3.1)
LYMPHOCYTES NFR BLD AUTO: 25.6 % (ref 19.6–45.3)
MCH RBC QN AUTO: 30.3 PG (ref 26.6–33)
MCHC RBC AUTO-ENTMCNC: 33.9 G/DL (ref 31.5–35.7)
MCV RBC AUTO: 89.5 FL (ref 79–97)
MONOCYTES # BLD AUTO: 0.44 10*3/MM3 (ref 0.1–0.9)
MONOCYTES NFR BLD AUTO: 7.7 % (ref 5–12)
NEUTROPHILS NFR BLD AUTO: 3.66 10*3/MM3 (ref 1.7–7)
NEUTROPHILS NFR BLD AUTO: 64.2 % (ref 42.7–76)
NRBC BLD AUTO-RTO: 0 /100 WBC (ref 0–0.2)
PLATELET # BLD AUTO: 208 10*3/MM3 (ref 140–450)
PMV BLD AUTO: 9.7 FL (ref 6–12)
POTASSIUM SERPL-SCNC: 3.8 MMOL/L (ref 3.5–5.2)
PROT SERPL-MCNC: 7 G/DL (ref 6–8.5)
RBC # BLD AUTO: 4.58 10*6/MM3 (ref 4.14–5.8)
SODIUM SERPL-SCNC: 138 MMOL/L (ref 136–145)
TIBC SERPL-MCNC: 340 MCG/DL (ref 298–536)
TRANSFERRIN SERPL-MCNC: 228 MG/DL (ref 200–360)
TRIGL SERPL-MCNC: 192 MG/DL (ref 0–150)
TSH SERPL DL<=0.05 MIU/L-ACNC: 1.27 UIU/ML (ref 0.27–4.2)
VIT B12 BLD-MCNC: 582 PG/ML (ref 211–946)
VLDLC SERPL-MCNC: 33 MG/DL (ref 5–40)
WBC NRBC COR # BLD AUTO: 5.7 10*3/MM3 (ref 3.4–10.8)

## 2025-06-23 PROCEDURE — 99214 OFFICE O/P EST MOD 30 MIN: CPT | Performed by: STUDENT IN AN ORGANIZED HEALTH CARE EDUCATION/TRAINING PROGRAM

## 2025-06-23 PROCEDURE — 99396 PREV VISIT EST AGE 40-64: CPT | Performed by: STUDENT IN AN ORGANIZED HEALTH CARE EDUCATION/TRAINING PROGRAM

## 2025-06-23 PROCEDURE — 83540 ASSAY OF IRON: CPT | Performed by: STUDENT IN AN ORGANIZED HEALTH CARE EDUCATION/TRAINING PROGRAM

## 2025-06-23 PROCEDURE — 84466 ASSAY OF TRANSFERRIN: CPT | Performed by: STUDENT IN AN ORGANIZED HEALTH CARE EDUCATION/TRAINING PROGRAM

## 2025-06-23 PROCEDURE — 80061 LIPID PANEL: CPT | Performed by: STUDENT IN AN ORGANIZED HEALTH CARE EDUCATION/TRAINING PROGRAM

## 2025-06-23 PROCEDURE — 82746 ASSAY OF FOLIC ACID SERUM: CPT | Performed by: STUDENT IN AN ORGANIZED HEALTH CARE EDUCATION/TRAINING PROGRAM

## 2025-06-23 PROCEDURE — 80050 GENERAL HEALTH PANEL: CPT | Performed by: STUDENT IN AN ORGANIZED HEALTH CARE EDUCATION/TRAINING PROGRAM

## 2025-06-23 PROCEDURE — 82728 ASSAY OF FERRITIN: CPT | Performed by: STUDENT IN AN ORGANIZED HEALTH CARE EDUCATION/TRAINING PROGRAM

## 2025-06-23 PROCEDURE — 83036 HEMOGLOBIN GLYCOSYLATED A1C: CPT | Performed by: STUDENT IN AN ORGANIZED HEALTH CARE EDUCATION/TRAINING PROGRAM

## 2025-06-23 PROCEDURE — 82607 VITAMIN B-12: CPT | Performed by: STUDENT IN AN ORGANIZED HEALTH CARE EDUCATION/TRAINING PROGRAM

## 2025-06-23 RX ORDER — ESCITALOPRAM OXALATE 10 MG/1
10 TABLET ORAL DAILY
Qty: 90 TABLET | Refills: 3 | Status: SHIPPED | OUTPATIENT
Start: 2025-06-23

## 2025-06-23 NOTE — PROGRESS NOTES
Male Physical Note      Date: 2025   Patient Name: Devon Smith  : 1980   MRN: [id number]     Chief Complaint:    Chief Complaint   Patient presents with    Annual Exam       History of Present Illness: Devon Smith is a 45 y.o. male with history of anxiety, myocardial infarction complicated by systolic heart failure (anterior STEMI 3-23), prediabetes, RADHA who is here today for their annual health maintenance and physical.    HPI  History of Present Illness  The patient is a 45-year-old male who presents for an annual exam.    He reports persistent fatigue, often necessitating a nap during his lunch break. He works close to home and finds it challenging to maintain wakefulness throughout the day without a short nap at lunch. He does not experience hypersomnolence or narcolepsy-like symptoms. 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 He acknowledges the need for increased physical activity to elevate his heart rate. He has undergone a sleep study and is currently using a CPAP machine but still feels tired most days.    He has been experiencing increased heartburn, which he attributes to recent weight gain. He expresses reluctance to take daily medication for this issue but is concerned about potential damage from untreated heartburn.    He is on Lexapro for anxiety, which he reports as being well-managed to the point where he does not notice its effects. He notes that his anxiety levels have decreased significantly over the past 4 to 5 years, allowing him to participate in activities such as attending music festivals.    He maintains regular dental check-ups but has not seen an ophthalmologist recently. His diet is suboptimal, and he admits to consuming excessive egg Mcmuffins.    RADHA  - I personally reviewed note by BECK Dailey of sleep medicine dated 3/3/2025.  Very good compliance with  PAP therapy, averaging 8 hours and 27 minutes of therapy nightly.  Well-controlled with AHI of 4.7/H.  Follow-up in 1 year.    Coronary artery disease  - I personally reviewed note by FLORENTINO Valladares of cardiology dated 4/24/2025.  At that time continued on metoprolol 25 mg daily, lisinopril 5 mg daily, spironolactone 25 mg daily.  Continue aspirin and statin.  Repeat echocardiogram ordered.    I personally reviewed Echocardiogram dated 5/9/25   - EF 50%, normal LV size, normal wall thickness.  Regional wall motion abnormalities are present.  - Left atrium normal in size  - Right ventricle normal in size and function  - Right atrium normal in size  - Aortic valve trileaflet and normal  - Mitral valve structure and function appear normal  - Tricuspid valve appears normal  - Estimated pulmonary artery pressures within normal limits  - Pulmonic valve is normal  - No pericardial effusion  - Aortic root and ascending aorta appear normal    Subjective      Review of Systems:   Review of Systems    Past Medical History, Social History, Family History and Care Team were all reviewed with patient and updated as appropriate.     Medications:     Current Outpatient Medications:     aspirin 81 MG EC tablet, Take 1 tablet by mouth Daily., Disp: 90 tablet, Rfl: 3    clotrimazole (LOTRIMIN) 1 % cream, Apply 1 Application topically to the appropriate area as directed As Needed., Disp: , Rfl:     empagliflozin (Jardiance) 10 MG tablet tablet, Take 1 tablet by mouth Daily., Disp: 90 tablet, Rfl: 3    escitalopram (LEXAPRO) 10 MG tablet, Take 1 tablet by mouth once daily, Disp: 30 tablet, Rfl: 0    lisinopril (PRINIVIL,ZESTRIL) 5 MG tablet, Take 1 tablet by mouth Daily., Disp: 90 tablet, Rfl: 3    loratadine (Claritin) 10 MG tablet, Take 1 tablet by mouth Daily., Disp: , Rfl:     metoprolol succinate XL (TOPROL-XL) 25 MG 24 hr tablet, Take 1 tablet by mouth Daily., Disp: 90 tablet, Rfl: 3    nitroglycerin (NITROSTAT) 0.4 MG  SL tablet, Place 1 tablet under the tongue Every 5 (Five) Minutes As Needed for Chest Pain. Take no more than 3 doses in 15 minutes., Disp: 25 tablet, Rfl: 12    rosuvastatin (CRESTOR) 40 MG tablet, Take 1 tablet by mouth Every Night., Disp: 90 tablet, Rfl: 3    spironolactone (ALDACTONE) 25 MG tablet, Take 0.5 tablets by mouth Daily., Disp: 45 tablet, Rfl: 3    valACYclovir (VALTREX) 1000 MG tablet, Take 1 tablet by mouth As Needed (flareup)., Disp: , Rfl:     Allergies:   No Known Allergies    Immunizations:  Td/Tdap(Booster Q 10 yrs):  UTD  Flu (Yearly):  NA recommend annually in fall  Pneumonia: UTD  Immunization History   Administered Date(s) Administered    COVID-19 (PFIZER) Purple Cap Monovalent 01/29/2021, 02/23/2021, 11/04/2021    Flu Vaccine Quad PF >36MO 01/29/2018    Flublok 18+yrs 11/04/2021    Fluzone  >6mos 12/20/2024    Fluzone (or Fluarix & Flulaval for VFC) >6mos 01/29/2018, 11/02/2018, 12/02/2022, 01/23/2024    Fluzone Quad >6mos (Multi-dose) 10/01/2019, 01/01/2022    Influenza Injectable Mdck Pf Quad 12/02/2022    Pneumococcal Conjugate 20-Valent (PCV20) 06/21/2023    Tdap 06/21/2023, 02/23/2025        Colorectal Screening:   UTD   Last Completed Colonoscopy            Upcoming       COLORECTAL CANCER SCREENING (COLOGUARD - Every 3 Years) Next due on 4/10/2026      04/10/2023  Cologuard component of Cologuard - Stool, Per Rectum                          Hep C (Age 18-79 once):  neg  HIV (Age 15-65 once) : neg      A1c: due  Lipid panel: due  The ASCVD Risk score (Liseth GALINDO, et al., 2019) failed to calculate for the following reasons:    Risk score cannot be calculated because patient has a medical history suggesting prior/existing ASCVD      Ophthalmologist: recommend  Dentist: regularly    Tobacco Use: Low Risk  (6/23/2025)    Patient History     Smoking Tobacco Use: Never     Smokeless Tobacco Use: Never     Passive Exposure: Never       Social History     Substance and Sexual Activity  "  Alcohol Use Never        Social History     Substance and Sexual Activity   Drug Use Never        Diet/Physical activity:   \"Doing horrible\"      PHQ-2 Depression Screening  Little interest or pleasure in doing things? Not at all   Feeling down, depressed, or hopeless? Not at all   PHQ-2 Total Score 0         Objective     Physical Exam:  Vital Signs:   Vitals:    06/23/25 0953   BP: 116/70   Pulse: 83   Resp: 16   Temp: 98 °F (36.7 °C)   TempSrc: Temporal   SpO2: 94%   Weight: 95.9 kg (211 lb 6 oz)   Height: 183 cm (72.05\")   PainSc: 0-No pain     Body mass index is 28.63 kg/m².     Physical Exam  Vitals reviewed.   Constitutional:       General: He is not in acute distress.     Appearance: Normal appearance. He is not ill-appearing or toxic-appearing.   HENT:      Head: Normocephalic and atraumatic.      Right Ear: External ear normal.      Left Ear: External ear normal.      Nose: Nose normal. No congestion.      Mouth/Throat:      Mouth: Mucous membranes are moist.   Eyes:      General: No scleral icterus.        Right eye: No discharge.         Left eye: No discharge.      Extraocular Movements: Extraocular movements intact.   Cardiovascular:      Rate and Rhythm: Normal rate and regular rhythm.      Pulses: Normal pulses.      Heart sounds: Normal heart sounds.   Pulmonary:      Effort: Pulmonary effort is normal. No respiratory distress.      Breath sounds: Normal breath sounds.   Abdominal:      General: Abdomen is flat. Bowel sounds are normal. There is no distension.      Palpations: Abdomen is soft. There is no mass.      Tenderness: There is no abdominal tenderness. There is no guarding or rebound.   Musculoskeletal:         General: No swelling or deformity. Normal range of motion.      Cervical back: Normal range of motion and neck supple. No rigidity or tenderness.   Lymphadenopathy:      Cervical: No cervical adenopathy.   Skin:     General: Skin is warm and dry.      Capillary Refill: Capillary " refill takes less than 2 seconds.      Coloration: Skin is not jaundiced or pale.   Neurological:      General: No focal deficit present.      Mental Status: He is alert and oriented to person, place, and time. Mental status is at baseline.   Psychiatric:         Mood and Affect: Mood normal.         Behavior: Behavior normal.         Judgment: Judgment normal.       Physical Exam        Procedures  Results  Imaging   - Echocardiogram: Ejection fraction back up to 50%.      Assessment / Plan      Assessment/Plan:   Problem List Items Addressed This Visit       Hyperlipidemia LDL goal <70    Relevant Orders    Lipid panel    Essential hypertension    Current Assessment & Plan   Chronic, well-controlled.  Continue medications as prescribed by cardiology.         Coronary artery disease involving native coronary artery of native heart without angina pectoris    Overview   3/1/2023: Anterior STEMI, mid % status post OCT guided PCI with a 4.0 x 38 mm Xience KAVON, proximal LAD 30-40%, normal circumflex and RCA, LVEF 25-30% due to anterior apical and distal inferior akinesis, EDP 36 mmHg         Current Assessment & Plan   Coronary artery disease is improving with treatment.  Continue current treatment regimen.  .  Cardiac status will be reassessed in 1 year. Recovered EF, great news. Continue aspirin, statin, bb, ace-I, aldactone, and jardiance         Prediabetes    Current Assessment & Plan   Chronic, improving with treatment.  Continue jardiance 10mg daily. Counseled on low carb diet and exercise to prevent development of diabetes. Repeat a1c         Relevant Orders    Hemoglobin A1c    TSH Rfx On Abnormal To Free T4    Generalized anxiety disorder    Current Assessment & Plan   Chronic, improving. States anxiety is well controlled. Continue lexapro 10mg daily.         Relevant Medications    escitalopram (LEXAPRO) 10 MG tablet    Obstructive sleep apnea    Current Assessment & Plan   Chronic, improving. Still  having fatigue. Continue CPAP         Gastroesophageal reflux disease     Other Visit Diagnoses         Routine health maintenance    -  Primary    Relevant Orders    CBC w AUTO Differential    Comprehensive metabolic panel      Screening for ischemic heart disease        Relevant Orders    Lipid panel      Other fatigue        Relevant Orders    CBC w AUTO Differential    TSH Rfx On Abnormal To Free T4    Vitamin B12    Folate    Iron and TIBC    Ferritin          Assessment & Plan  Heartburn.  - Experiencing increased heartburn, possibly related to weight gain.  - denies melena or dysphagia  - Advised to use over-the-counter Pepcid for relief.  - Counseling provided on monitoring for more serious symptoms and potential long-term use of medication.    Health Maintenance.  - Up to date on all vaccines; recommended to receive the influenza vaccine in the upcoming fall season.  - Up to date on colon cancer screening with Cologuard, not due until 04/2026.  - Advised to schedule annual eye examinations.  - Routine dental visits confirmed; encouraged to maintain regular check-ups.    Follow-up  The patient will follow up in 1 year for his annual examination or sooner if necessary.      Healthcare Maintenance:  Counseling provided based on age appropriate USPSTF guidelines.  BMI is >= 25 and <30. (Overweight) The following options were offered after discussion;: weight loss educational material (shared in after visit summary), exercise counseling/recommendations, and nutrition counseling/recommendations        Deovn Smith voices understanding and acceptance of this advice and will call back with any further questions or concerns. AVS with preventive healthcare tips printed for patient.     “Discussed risks/benefits to vaccination, reviewed components of the vaccine, discussed VIS, discussed informed consent, informed consent obtained. Patient/Parent was allowed to accept or refuse vaccine. Questions answered to  satisfactory state of patient/Parent. We reviewed typical age appropriate and seasonally appropriate vaccinations. Reviewed immunization history and updated state vaccination form as needed. Patient was counseled on Influenza    Follow Up:   Return in about 1 year (around 6/23/2026) for Annual, Fasting.      Galo Borges MD   [facility]    Patient or patient representative verbalized consent for the use of Ambient Listening during the visit with  Galo Borges MD for chart documentation. 6/23/2025  10:16 EDT

## 2025-06-23 NOTE — ASSESSMENT & PLAN NOTE
Chronic, improving with treatment.  Continue jardiance 10mg daily. Counseled on low carb diet and exercise to prevent development of diabetes. Repeat a1c

## 2025-06-23 NOTE — ASSESSMENT & PLAN NOTE
Coronary artery disease is improving with treatment.  Continue current treatment regimen.  .  Cardiac status will be reassessed in 1 year. Recovered EF, great news. Continue aspirin, statin, bb, ace-I, aldactone, and jardiance

## 2025-06-23 NOTE — PATIENT INSTRUCTIONS
Health Maintenance, Male  A healthy lifestyle and preventive care is important for your health and wellness. Ask your health care provider about what schedule of regular examinations is right for you.  What should I know about weight and diet?    Eat a Healthy Diet  Eat plenty of vegetables, fruits, whole grains, low-fat dairy products, and lean protein.  Do not eat a lot of foods high in solid fats, added sugars, or salt.     Maintain a Healthy Weight  Regular exercise can help you achieve or maintain a healthy weight. You should:  Do at least 150 minutes of exercise each week. The exercise should increase your heart rate and make you sweat (moderate-intensity exercise).  Do strength-training exercises at least twice a week.     Watch Your Levels of Cholesterol and Blood Lipids  Have your blood tested for lipids and cholesterol every 5 years starting at 35 years of age. If you are at high risk for heart disease, you should start having your blood tested when you are 20 years old. You may need to have your cholesterol levels checked more often if:  Your lipid or cholesterol levels are high.  You are older than 50 years of age.  You are at high risk for heart disease.     What should I know about cancer screening?  Many types of cancers can be detected early and may often be prevented.  Lung Cancer  You should be screened every year for lung cancer if:  You are a current smoker who has smoked for at least 30 years.  You are a former smoker who has quit within the past 15 years.  Talk to your health care provider about your screening options, when you should start screening, and how often you should be screened.     Colorectal Cancer  Routine colorectal cancer screening usually begins at 50 years of age and should be repeated every 5-10 years until you are 75 years old. You may need to be screened more often if early forms of precancerous polyps or small growths are found. Your health care provider may recommend  screening at an earlier age if you have risk factors for colon cancer.  Your health care provider may recommend using home test kits to check for hidden blood in the stool.  A small camera at the end of a tube can be used to examine your colon (sigmoidoscopy or colonoscopy). This checks for the earliest forms of colorectal cancer.     Prostate and Testicular Cancer  Depending on your age and overall health, your health care provider may do certain tests to screen for prostate and testicular cancer.  Talk to your health care provider about any symptoms or concerns you have about testicular or prostate cancer.     Skin Cancer  Check your skin from head to toe regularly.  Tell your health care provider about any new moles or changes in moles, especially if:  There is a change in a mole’s size, shape, or color.  You have a mole that is larger than a pencil eraser.  Always use sunscreen. Apply sunscreen liberally and repeat throughout the day.  Protect yourself by wearing long sleeves, pants, a wide-brimmed hat, and sunglasses when outside.     What should I know about heart disease, diabetes, and high blood pressure?  If you are 18-39 years of age, have your blood pressure checked every 3-5 years. If you are 40 years of age or older, have your blood pressure checked every year. You should have your blood pressure measured twice--once when you are at a hospital or clinic, and once when you are not at a hospital or clinic. Record the average of the two measurements. To check your blood pressure when you are not at a hospital or clinic, you can use:  An automated blood pressure machine at a pharmacy.  A home blood pressure monitor.  Talk to your health care provider about your target blood pressure.  If you are between 45-79 years old, ask your health care provider if you should take aspirin to prevent heart disease.  Have regular diabetes screenings by checking your fasting blood sugar level.  If you are at a normal  weight and have a low risk for diabetes, have this test once every three years after the age of 45.  If you are overweight and have a high risk for diabetes, consider being tested at a younger age or more often.  A one-time screening for abdominal aortic aneurysm (AAA) by ultrasound is recommended for men aged 65-75 years who are current or former smokers.  What should I know about preventing infection?  Hepatitis B  If you have a higher risk for hepatitis B, you should be screened for this virus. Talk with your health care provider to find out if you are at risk for hepatitis B infection.  Hepatitis C  Blood testing is recommended for:  Everyone born from 1945 through 1965.  Anyone with known risk factors for hepatitis C.     Sexually Transmitted Diseases (STDs)  You should be screened each year for STDs including gonorrhea and chlamydia if:  You are sexually active and are younger than 24 years of age.  You are older than 24 years of age and your health care provider tells you that you are at risk for this type of infection.  Your sexual activity has changed since you were last screened and you are at an increased risk for chlamydia or gonorrhea. Ask your health care provider if you are at risk.  Talk with your health care provider about whether you are at high risk of being infected with HIV. Your health care provider may recommend a prescription medicine to help prevent HIV infection.     What else can I do?    Schedule regular health, dental, and eye exams.  Stay current with your vaccines (immunizations).  Do not use any tobacco products, such as cigarettes, chewing tobacco, and e-cigarettes. If you need help quitting, ask your health care provider.  Limit alcohol intake to no more than 2 drinks per day. One drink equals 12 ounces of beer, 5 ounces of wine, or 1½ ounces of hard liquor.  Do not use street drugs.  Do not share needles.  Ask your health care provider for help if you need support or information  about quitting drugs.  Tell your health care provider if you often feel depressed.  Tell your health care provider if you have ever been abused or do not feel safe at home.      This information is not intended to replace advice given to you by your health care provider. Make sure you discuss any questions you have with your health care provider.  Document Released: 06/15/2009 Document Revised: 08/16/2017 Document Reviewed: 09/20/2016  [employer] Patient Education © 2018 [employer]    Healthy Eating  Following a healthy eating pattern may help you to achieve and maintain a healthy body weight, reduce the risk of chronic disease, and live a long and productive life. It is important to follow a healthy eating pattern at an appropriate calorie level for your body. Your nutritional needs should be met primarily through food by choosing a variety of nutrient-rich foods.  What are tips for following this plan?  Reading food labels  Read labels and choose the following:  Reduced or low sodium.  Juices with 100% fruit juice.  Foods with low saturated fats and high polyunsaturated and monounsaturated fats.  Foods with whole grains, such as whole wheat, cracked wheat, brown rice, and wild rice.  Whole grains that are fortified with folic acid. This is recommended for women who are pregnant or who want to become pregnant.  Read labels and avoid the following:  Foods with a lot of added sugars. These include foods that contain brown sugar, corn sweetener, corn syrup, dextrose, fructose, glucose, high-fructose corn syrup, honey, invert sugar, lactose, malt syrup, maltose, molasses, raw sugar, sucrose, trehalose, or turbinado sugar.  Do not eat more than the following amounts of added sugar per day:  6 teaspoons (25 g) for women.  9 teaspoons (38 g) for men.  Foods that contain processed or refined starches and grains.  Refined grain products, such as white flour, degermed cornmeal, white bread, and white  "rice.  Shopping  Choose nutrient-rich snacks, such as vegetables, whole fruits, and nuts. Avoid high-calorie and high-sugar snacks, such as potato chips, fruit snacks, and candy.  Use oil-based dressings and spreads on foods instead of solid fats such as butter, stick margarine, or cream cheese.  Limit pre-made sauces, mixes, and \"instant\" products such as flavored rice, instant noodles, and ready-made pasta.  Try more plant-protein sources, such as tofu, tempeh, black beans, edamame, lentils, nuts, and seeds.  Explore eating plans such as the Mediterranean diet or vegetarian diet.  Cooking  Use oil to sauté or stir-suarez foods instead of solid fats such as butter, stick margarine, or lard.  Try baking, boiling, grilling, or broiling instead of frying.  Remove the fatty part of meats before cooking.  Steam vegetables in water or broth.  Meal planning    At meals, imagine dividing your plate into fourths:  One-half of your plate is fruits and vegetables.  One-fourth of your plate is whole grains.  One-fourth of your plate is protein, especially lean meats, poultry, eggs, tofu, beans, or nuts.  Include low-fat dairy as part of your daily diet.     Lifestyle  Choose healthy options in all settings, including home, work, school, restaurants, or stores.  Prepare your food safely:  Wash your hands after handling raw meats.  Keep food preparation surfaces clean by regularly washing with hot, soapy water.  Keep raw meats separate from ready-to-eat foods, such as fruits and vegetables.  , meat, poultry, and eggs to the recommended internal temperature.  Store foods at safe temperatures. In general:  Keep cold foods at 40°F (4.4°C) or below.  Keep hot foods at 140°F (60°C) or above.  Keep your freezer at 0°F (-17.8°C) or below.  Foods are no longer safe to eat when they have been between the temperatures of 40°-140°F (4.4-60°C) for more than 2 hours.  What foods should I eat?  Fruits  Aim to eat 2 cup-equivalents of " fresh, canned (in natural juice), or frozen fruits each day. Examples of 1 cup-equivalent of fruit include 1 small apple, 8 large strawberries, 1 cup canned fruit, ½ cup dried fruit, or 1 cup 100% juice.  Vegetables  Aim to eat 2½-3 cup-equivalents of fresh and frozen vegetables each day, including different varieties and colors. Examples of 1 cup-equivalent of vegetables include 2 medium carrots, 2 cups raw, leafy greens, 1 cup chopped vegetable (raw or cooked), or 1 medium baked potato.  Grains  Aim to eat 6 ounce-equivalents of whole grains each day. Examples of 1 ounce-equivalent of grains include 1 slice of bread, 1 cup ready-to-eat cereal, 3 cups popcorn, or ½ cup cooked rice, pasta, or cereal.  Meats and other proteins  Aim to eat 5-6 ounce-equivalents of protein each day. Examples of 1 ounce-equivalent of protein include 1 egg, 1/2 cup nuts or seeds, or 1 tablespoon (16 g) peanut butter. A cut of meat or fish that is the size of a deck of cards is about 3-4 ounce-equivalents.  Of the protein you eat each week, try to have at least 8 ounces come from seafood. This includes salmon, trout, herring, and anchovies.  Dairy  Aim to eat 3 cup-equivalents of fat-free or low-fat dairy each day. Examples of 1 cup-equivalent of dairy include 1 cup (240 mL) milk, 8 ounces (250 g) yogurt, 1½ ounces (44 g) natural cheese, or 1 cup (240 mL) fortified soy milk.  Fats and oils  Aim for about 5 teaspoons (21 g) per day. Choose monounsaturated fats, such as canola and olive oils, avocados, peanut butter, and most nuts, or polyunsaturated fats, such as sunflower, corn, and soybean oils, walnuts, pine nuts, sesame seeds, sunflower seeds, and flaxseed.  Beverages  Aim for six 8-oz glasses of water per day. Limit coffee to three to five 8-oz cups per day.  Limit caffeinated beverages that have added calories, such as soda and energy drinks.  Limit alcohol intake to no more than 1 drink a day for nonpregnant women and 2 drinks a day  for men. One drink equals 12 oz of beer (355 mL), 5 oz of wine (148 mL), or 1½ oz of hard liquor (44 mL).  Seasoning and other foods  Avoid adding excess amounts of salt to your foods. Try flavoring foods with herbs and spices instead of salt.  Avoid adding sugar to foods.  Try using oil-based dressings, sauces, and spreads instead of solid fats.  This information is based on general [place] nutrition guidelines. For more information, visit choosemyplate.gov. Exact amounts may vary based on your nutrition needs.  Summary  A healthy eating plan may help you to maintain a healthy weight, reduce the risk of chronic diseases, and stay active throughout your life.  Plan your meals. Make sure you eat the right portions of a variety of nutrient-rich foods.  Try baking, boiling, grilling, or broiling instead of frying.  Choose healthy options in all settings, including home, work, school, restaurants, or stores.  This information is not intended to replace advice given to you by your health care provider. Make sure you discuss any questions you have with your health care provider.  Document Revised: 04/01/2019 Document Reviewed: 04/01/2019  [employer] Patient Education © 2021 [employer]    Exercising to Stay Healthy  To become healthy and stay healthy, it is recommended that you do moderate-intensity and vigorous-intensity exercise. You can tell that you are exercising at a moderate intensity if your heart starts beating faster and you start breathing faster but can still hold a conversation. You can tell that you are exercising at a vigorous intensity if you are breathing much harder and faster and cannot hold a conversation while exercising.  Exercising regularly is important. It has many health benefits, such as:  Improving overall fitness, flexibility, and endurance.  Increasing bone density.  Helping with weight control.  Decreasing body fat.  Increasing muscle strength.  Reducing stress and tension.  Improving overall  health.  How often should I exercise?  Choose an activity that you enjoy, and set realistic goals. Your health care provider can help you make an activity plan that works for you.  Exercise regularly as told by your health care provider. This may include:  Doing strength training two times a week, such as:  Lifting weights.  Using resistance bands.  Push-ups.  Sit-ups.  Yoga.  Doing a certain intensity of exercise for a given amount of time. Choose from these options:  A total of 150 minutes of moderate-intensity exercise every week.  A total of 75 minutes of vigorous-intensity exercise every week.  A mix of moderate-intensity and vigorous-intensity exercise every week.  Children, pregnant women, people who have not exercised regularly, people who are overweight, and older adults may need to talk with a health care provider about what activities are safe to do. If you have a medical condition, be sure to talk with your health care provider before you start a new exercise program.  What are some exercise ideas?    Moderate-intensity exercise ideas include:  Walking 1 mile (1.6 km) in about 15 minutes.  Biking.  Hiking.  Golfing.  Dancing.  Water aerobics.  Vigorous-intensity exercise ideas include:  Walking 4.5 miles (7.2 km) or more in about 1 hour.  Jogging or running 5 miles (8 km) in about 1 hour.  Biking 10 miles (16.1 km) or more in about 1 hour.  Lap swimming.  Roller-skating or in-line skating.  Cross-country skiing.  Vigorous competitive sports, such as football, basketball, and soccer.  Jumping rope.  Aerobic dancing.  What are some everyday activities that can help me to get exercise?  Yard work, such as:  Pushing a .  Raking and bagging leaves.  Washing your car.  Pushing a stroller.  Shoveling snow.  Gardening.  Washing windows or floors.  How can I be more active in my day-to-day activities?  Use stairs instead of an elevator.  Take a walk during your lunch break.  If you drive, park your car  farther away from your work or school.  If you take public transportation, get off one stop early and walk the rest of the way.  Stand up or walk around during all of your indoor phone calls.  Get up, stretch, and walk around every 30 minutes throughout the day.  Enjoy exercise with a friend. Support to continue exercising will help you keep a regular routine of activity.  What guidelines can I follow while exercising?  Before you start a new exercise program, talk with your health care provider.  Do not exercise so much that you hurt yourself, feel dizzy, or get very short of breath.  Wear comfortable clothes and wear shoes with good support.  Drink plenty of water while you exercise to prevent dehydration or heat stroke.  Work out until your breathing and your heartbeat get faster.  Where to find more information  [place] Department of Health and Human Services: www.hhs.gov  Centers for Disease Control and Prevention (CDC): www.cdc.gov  Summary  Exercising regularly is important. It will improve your overall fitness, flexibility, and endurance.  Regular exercise also will improve your overall health. It can help you control your weight, reduce stress, and improve your bone density.  Do not exercise so much that you hurt yourself, feel dizzy, or get very short of breath.  Before you start a new exercise program, talk with your health care provider.  This information is not intended to replace advice given to you by your health care provider. Make sure you discuss any questions you have with your health care provider.  Document Revised: 11/30/2018 Document Reviewed: 11/08/2018  [employer] Patient Education © 2021 [employer]

## 2025-08-22 ENCOUNTER — OFFICE VISIT (OUTPATIENT)
Dept: INTERNAL MEDICINE | Facility: CLINIC | Age: 45
End: 2025-08-22
Payer: COMMERCIAL

## 2025-08-22 VITALS
SYSTOLIC BLOOD PRESSURE: 114 MMHG | HEIGHT: 72 IN | TEMPERATURE: 99.1 F | RESPIRATION RATE: 12 BRPM | DIASTOLIC BLOOD PRESSURE: 80 MMHG | BODY MASS INDEX: 27.33 KG/M2 | WEIGHT: 201.8 LBS | HEART RATE: 60 BPM

## 2025-08-22 DIAGNOSIS — R42 VERTIGO: Primary | ICD-10-CM

## 2025-08-22 DIAGNOSIS — R11.0 NAUSEA: ICD-10-CM

## 2025-08-22 DIAGNOSIS — R09.81 SINUS CONGESTION: ICD-10-CM

## 2025-08-22 LAB
EXPIRATION DATE: NORMAL
FLUAV AG UPPER RESP QL IA.RAPID: NOT DETECTED
FLUBV AG UPPER RESP QL IA.RAPID: NOT DETECTED
INTERNAL CONTROL: NORMAL
Lab: NORMAL
SARS-COV-2 AG UPPER RESP QL IA.RAPID: NOT DETECTED

## 2025-08-22 RX ORDER — METHYLPREDNISOLONE 4 MG/1
TABLET ORAL
Qty: 21 TABLET | Refills: 0 | Status: SHIPPED | OUTPATIENT
Start: 2025-08-22

## 2025-08-22 RX ORDER — MECLIZINE HYDROCHLORIDE 25 MG/1
25 TABLET ORAL 3 TIMES DAILY PRN
Qty: 30 TABLET | Refills: 0 | Status: SHIPPED | OUTPATIENT
Start: 2025-08-22

## 2025-08-22 RX ORDER — ONDANSETRON 4 MG/1
4 TABLET, FILM COATED ORAL ONCE
Status: COMPLETED | OUTPATIENT
Start: 2025-08-22 | End: 2025-08-22

## 2025-08-22 RX ORDER — GUAIFENESIN 600 MG/1
1200 TABLET, EXTENDED RELEASE ORAL 2 TIMES DAILY
COMMUNITY

## 2025-08-22 RX ADMIN — ONDANSETRON 4 MG: 4 TABLET, FILM COATED ORAL at 12:25

## (undated) DEVICE — PK CATH CARD 10

## (undated) DEVICE — DEV INFL MONARCH 25W

## (undated) DEVICE — ADULT, W/LG. BACK PAD, RADIOTRANSPARENT ELEMENT AND LEAD WIRE: Brand: DEFIBRILLATION ELECTRODES

## (undated) DEVICE — ST INF PRI SMRTSTE 20DRP 2VLV 24ML 117

## (undated) DEVICE — MODEL AT P65, P/N 701554-001KIT CONTENTS: HAND CONTROLLER, 3-WAY HIGH-PRESSURE STOPCOCK WITH ROTATING END AND PREMIUM HIGH-PRESSURE TUBING: Brand: ANGIOTOUCH® KIT

## (undated) DEVICE — ST EXT IV SMRTSTE 2VLV FIX M LL 6ML 41

## (undated) DEVICE — GLIDESHEATH SLENDER STAINLESS STEEL KIT: Brand: GLIDESHEATH SLENDER

## (undated) DEVICE — Device: Brand: ASAHI SION BLUE

## (undated) DEVICE — NC TREK NEO™ CORONARY DILATATION CATHETER 2.50 X 12 MM / RAPID-EXCHANGE: Brand: NC TREK NEO™

## (undated) DEVICE — KT CATH IMG DRAGONFLY OPTIS 2.7F 135CM

## (undated) DEVICE — CATH DIAG EXPO M/ PK 5F FL4/FR4 PIG

## (undated) DEVICE — NC TREK NEO™ CORONARY DILATATION CATHETER 4.00 MM X 15 MM / RAPID-EXCHANGE: Brand: NC TREK NEO™

## (undated) DEVICE — MODEL BT2000 P/N 700287-012KIT CONTENTS: MANIFOLD WITH SALINE AND CONTRAST PORTS, SALINE TUBING WITH SPIKE AND HAND SYRINGE, TRANSDUCER: Brand: BT2000 AUTOMATED MANIFOLD KIT

## (undated) DEVICE — CATH DIAG EXPO .045 FL3.5 5F 100CM

## (undated) DEVICE — DEV COMP RAD PRELUDESYNC 24CM

## (undated) DEVICE — GW INQWIRE FC PTFE STD J/1.5 .035 260

## (undated) DEVICE — GUIDE CATHETER: Brand: MACH1™